# Patient Record
Sex: FEMALE | Race: WHITE | NOT HISPANIC OR LATINO | Employment: FULL TIME | URBAN - METROPOLITAN AREA
[De-identification: names, ages, dates, MRNs, and addresses within clinical notes are randomized per-mention and may not be internally consistent; named-entity substitution may affect disease eponyms.]

---

## 2017-05-15 ENCOUNTER — ALLSCRIPTS OFFICE VISIT (OUTPATIENT)
Dept: OTHER | Facility: OTHER | Age: 57
End: 2017-05-15

## 2017-06-22 ENCOUNTER — ALLSCRIPTS OFFICE VISIT (OUTPATIENT)
Dept: OTHER | Facility: OTHER | Age: 57
End: 2017-06-22

## 2017-09-04 DIAGNOSIS — E03.9 HYPOTHYROIDISM: ICD-10-CM

## 2017-09-04 DIAGNOSIS — E78.5 HYPERLIPIDEMIA: ICD-10-CM

## 2017-09-04 DIAGNOSIS — Z13.21 ENCOUNTER FOR SCREENING FOR NUTRITIONAL DISORDER: ICD-10-CM

## 2017-09-10 ENCOUNTER — LAB CONVERSION - ENCOUNTER (OUTPATIENT)
Dept: OTHER | Facility: OTHER | Age: 57
End: 2017-09-10

## 2017-09-10 LAB
25(OH)D3 SERPL-MCNC: 31 NG/ML (ref 30–100)
A/G RATIO (HISTORICAL): 1.8 (CALC) (ref 1–2.5)
ALBUMIN SERPL BCP-MCNC: 4.2 G/DL (ref 3.6–5.1)
ALP SERPL-CCNC: 69 U/L (ref 33–130)
ALT SERPL W P-5'-P-CCNC: 16 U/L (ref 6–29)
AST SERPL W P-5'-P-CCNC: 19 U/L (ref 10–35)
BASOPHILS # BLD AUTO: 0.8 %
BASOPHILS # BLD AUTO: 30 CELLS/UL (ref 0–200)
BILIRUB SERPL-MCNC: 0.4 MG/DL (ref 0.2–1.2)
BUN SERPL-MCNC: 19 MG/DL (ref 7–25)
BUN/CREA RATIO (HISTORICAL): NORMAL (CALC) (ref 6–22)
CALCIUM SERPL-MCNC: 9.4 MG/DL (ref 8.6–10.4)
CHLORIDE SERPL-SCNC: 107 MMOL/L (ref 98–110)
CHOLEST SERPL-MCNC: 211 MG/DL
CHOLEST/HDLC SERPL: 3.1 (CALC)
CO2 SERPL-SCNC: 27 MMOL/L (ref 20–31)
CREAT SERPL-MCNC: 0.56 MG/DL (ref 0.5–1.05)
DEPRECATED RDW RBC AUTO: 14.5 % (ref 11–15)
EGFR AFRICAN AMERICAN (HISTORICAL): 119 ML/MIN/1.73M2
EGFR-AMERICAN CALC (HISTORICAL): 103 ML/MIN/1.73M2
EOSINOPHIL # BLD AUTO: 122 CELLS/UL (ref 15–500)
EOSINOPHIL # BLD AUTO: 3.2 %
GAMMA GLOBULIN (HISTORICAL): 2.4 G/DL (CALC) (ref 1.9–3.7)
GLUCOSE (HISTORICAL): 84 MG/DL (ref 65–99)
HCT VFR BLD AUTO: 32.7 % (ref 35–45)
HDLC SERPL-MCNC: 67 MG/DL
HGB BLD-MCNC: 10.5 G/DL (ref 11.7–15.5)
LDL CHOLESTEROL (HISTORICAL): 129 MG/DL (CALC)
LYMPHOCYTES # BLD AUTO: 1201 CELLS/UL (ref 850–3900)
LYMPHOCYTES # BLD AUTO: 31.6 %
MCH RBC QN AUTO: 26.7 PG (ref 27–33)
MCHC RBC AUTO-ENTMCNC: 32.1 G/DL (ref 32–36)
MCV RBC AUTO: 83.2 FL (ref 80–100)
MONOCYTES # BLD AUTO: 380 CELLS/UL (ref 200–950)
MONOCYTES (HISTORICAL): 10 %
NEUTROPHILS # BLD AUTO: 2067 CELLS/UL (ref 1500–7800)
NEUTROPHILS # BLD AUTO: 54.4 %
NON-HDL-CHOL (CHOL-HDL) (HISTORICAL): 144 MG/DL (CALC)
PLATELET # BLD AUTO: 276 THOUSAND/UL (ref 140–400)
PMV BLD AUTO: 10.9 FL (ref 7.5–12.5)
POTASSIUM SERPL-SCNC: 4.1 MMOL/L (ref 3.5–5.3)
RBC # BLD AUTO: 3.93 MILLION/UL (ref 3.8–5.1)
SODIUM SERPL-SCNC: 142 MMOL/L (ref 135–146)
TOTAL PROTEIN (HISTORICAL): 6.6 G/DL (ref 6.1–8.1)
TRIGL SERPL-MCNC: 48 MG/DL
TSH SERPL DL<=0.05 MIU/L-ACNC: 0.03 MIU/L (ref 0.4–4.5)
WBC # BLD AUTO: 3.8 THOUSAND/UL (ref 3.8–10.8)

## 2017-11-02 DIAGNOSIS — E03.9 HYPOTHYROIDISM: ICD-10-CM

## 2017-12-17 ENCOUNTER — LAB CONVERSION - ENCOUNTER (OUTPATIENT)
Dept: OTHER | Facility: OTHER | Age: 57
End: 2017-12-17

## 2017-12-17 LAB — TSH SERPL DL<=0.05 MIU/L-ACNC: 0.25 MIU/L (ref 0.4–4.5)

## 2018-01-02 ENCOUNTER — TRANSCRIBE ORDERS (OUTPATIENT)
Dept: ADMINISTRATIVE | Facility: HOSPITAL | Age: 58
End: 2018-01-02

## 2018-01-02 DIAGNOSIS — M81.0 SENILE OSTEOPOROSIS: Primary | ICD-10-CM

## 2018-01-04 ENCOUNTER — GENERIC CONVERSION - ENCOUNTER (OUTPATIENT)
Dept: FAMILY MEDICINE CLINIC | Facility: MEDICAL CENTER | Age: 58
End: 2018-01-04

## 2018-01-04 ENCOUNTER — HOSPITAL ENCOUNTER (OUTPATIENT)
Dept: RADIOLOGY | Facility: MEDICAL CENTER | Age: 58
Discharge: HOME | End: 2018-01-04
Payer: COMMERCIAL

## 2018-01-04 DIAGNOSIS — M81.0 SENILE OSTEOPOROSIS: ICD-10-CM

## 2018-01-04 PROCEDURE — 77080 DXA BONE DENSITY AXIAL: CPT

## 2018-01-11 ENCOUNTER — ALLSCRIPTS OFFICE VISIT (OUTPATIENT)
Dept: OTHER | Facility: OTHER | Age: 58
End: 2018-01-11

## 2018-01-11 NOTE — MISCELLANEOUS
Provider Comments  Provider Comments:   Patient no showed for her rheumatology follow up today        Signatures   Electronically signed by : MAKENNA Ibarra; Oct 17 2016  3:16PM EST                       (Author)

## 2018-01-12 VITALS
WEIGHT: 132 LBS | BODY MASS INDEX: 21.21 KG/M2 | HEIGHT: 66 IN | HEART RATE: 70 BPM | TEMPERATURE: 98.1 F | RESPIRATION RATE: 16 BRPM | DIASTOLIC BLOOD PRESSURE: 78 MMHG | SYSTOLIC BLOOD PRESSURE: 132 MMHG

## 2018-01-12 NOTE — CONSULTS
Assessment    1  Arthralgia (719 40) (M25 50)   2  Fibromyalgia (729 1) (M79 7)   3  Malaise and fatigue (780 79) (R53 81,R53 83)   4  Anemia (285 9) (D64 9)   5  Hypothyroidism (244 9) (E03 9)   6  Hyperlipidemia (272 4) (E78 5)   7  Anxiety disorder (300 00) (F41 9)   8  Celiac sprue (579 0) (K90 0)    Plan  Anemia, Arthralgia, Malaise and fatigue    · (1) FERRITIN; Status:Active; Requested for:80Byv2877;    · (1) FOLATE; Status:Active; Requested for:61Djb0168;    · (1) IRON; Status:Active; Requested for:06Lpm4144;    · (1) TIBC; Status:Active; Requested for:72Qfh4381;    · (1) VITAMIN B12; Status:Active; Requested for:07Apr2016; Arthralgia    · Call [phone] if: The pain seems worse ; Status:Complete;   Done: 05ZJF4058   · Call [phone] if: You have questions or concerns about your problem ;  Status:Complete;   Done: 07Apr2016  Arthralgia, Malaise and fatigue    · (1) REJI SCREEN W/REFLEX TO TITER/PATTERN; Status:Active; Requested  for:68Ykr1631;    · (1) CBC/PLT/DIFF; Status:Active; Requested for:68Yds9940;    · (1) CHRONIC HEPATITIS PANEL; Status:Active; Requested for:41Hfq2886;    · (1) COMPREHENSIVE METABOLIC PANEL; Status:Active; Requested for:28Xgw7019;    · (1) C-REACTIVE PROTEIN; Status:Active; Requested HPY:46NNV1250;    · (1) CYCLIC CITRULLINATED PEPTIDE; Status:Active; Requested for:35Oux8605;    · (1) HIV AG/AB COMBO, 4TH GEN; [Do Not Release]; Status:Active; Requested  for:10Etd1552;    · (1) HLA B27; Status:Active; Requested for:27Xww2141;    · (1) RHEUMATOID FACTOR SCREEN; Status:Active; Requested for:60Wmp5430;    · (1) SED RATE; Status:Active; Requested for:15Ews3952;    · (1) SJOGREN'S ANTIBODIES; Status:Active; Requested for:07Apr2016;    · (1) TSH WITH FT4 REFLEX; Status:Active; Requested for:07Apr2016;    · (1) VITAMIN D 25-HYDROXY; Status:Active;  Requested for:07Apr2016;     Discussion/Summary    Ms Theodore Nielsen is a 50-year-old  female who presents the office with a long-standing history of fibromyalgia, with recent worsening in her arthralgias  She states that she previously saw Dr Raymundo Martin many years ago, maybe initial diagnosis of fibromyalgia  She did not offer her any definitive treatments for her symptoms at that time, with the exception of encouraging exercise  She complains of significant pain in her bilateral fingers, wrists, and right hip  She reports locking of her right hip and right shoulder at times  She has significant difficulty working because of her pain  She also reports severe fatigue  She has a family history of rheumatoid arthritis in her mother and father, and has concerns about this being a potential issue in her as well  She also complains of neck and lower back pain  The pain seems to have worsened over the last year, without any obvious precipitating factor for this  She does occasionally take Tylenol as needed for her discomfort with only mild relief  She is unable to take NSAIDs because of an allergy  She does report difficulty sleeping at night because of her pain  She does report the pain as being sharp, achy, and locking in quality  The pain seems to be worsened by her activities at her job  She has noted swelling in her knees, especially the right, which has had prior surgery  She also reports significant ankle pain when she is ambulating  She reports a significant amount of morning stiffness typically lasting one to 2 hours in duration, as well as gelling when sitting for prolonged periods of time  She also reports nonrestorative sleep  On exam, there is mild synovitis of the bilateral PIPs of the hands, as well as the bilateral ankles  There is no other active synovitis  She has diffuse myofascial tender points throughout the spine, as well as the bilateral upper and lower extremities  There is crepitus of the bilateral knees  She is decreased range of motion of her shoulders, cervical spine, and lumbar spine   Her pain is out of proportion to her exam findings  No recent rheumatologic labs were available for review today  At this time, she does appear to have a mild polyarthritis involving the PIPs of her hands and her bilateral ankles, of unclear etiology  Her pain is out of proportion to her exam findings, and it is quite possible that fibromyalgia is active and uncontrolled as well  I will not start her on any medications at this time, but I will check additional laboratory studies to further investigate this  I will reevaluate her in one month  She will call in the interim if there are any questions or concerns  Counseling  Rheumatology Counseling Documentation: The patient was counseled regarding instructions for management and impressions  Chief Complaint  Arthralgias and fatigue      History of Present Illness  Pt  is a 62 yo  female who presents with history of fibromyalgia  Previously saw Dr Austyn Narvaez many years ago, who did not offer any specific treatment  c/o pain in fingers, wrists, and R hip  R hip locks when walking  Also with R shoulder pain  + difficulty working because of pain  Also with severe fatigue  + family history of RA in mom and dad  Also with pain in neck and back as well  + difficulty sleeping due to pain  Pain began to worsen over the last year  No obvious precipitating factors  Unable to take NSAIDs -> allergic  Takes Tylenol as needed for pain with some relief  Also uses topical OTC aids  Pain has been constant over the last several months  Pain described as sharp, aching, and "locking" in quality  Pain worsened by work activities  + swelling in knees  History of prior surgery on R knee  Also with ankle pain when walking  + AM stiffness x 1-2 hours  + gelling when sitting for periods of time  + non-restorative sleep  RAPID3: 12 0/30      Review of Systems    Constitutional: fatigue and anorexia, but no fever, no recent weight gain, no chills and no recent weight loss     HEENT: blurred vision, but no double vision, no amaurosis fugax, no dryness of the eyes, no eye pain, no erythema eye(s), no dryness mouth, no mouth sores, not feeling congested and no sore throat  Cardiovascular: dyspnea on exertion, but no chest pain or pressure and no swelling in the arms or legs  Respiratory: no unusual or persistent cough, no shortness of breath and no pleurisy  Gastrointestinal: no abdominal pain, no nausea, no vomiting, no heartburn, no diarrhea, no constipation, no melena and no BRBPR  Genitourinary: no foamy urine, but no dysuria and no hematuria  Musculoskeletal: as noted in HPI  Integumentary alopecia, but no rash, no Raynaud's, no nail changes and no photosensitivity  Endocrine no polyuria and no polydipsia  Hematologic/Lymphatic: no unusual bleeding and no tendency for easy bruising  Neurological: headache, but no vertigo or dizziness, no tingling and no weakness  Psychiatric: insomnia, non-restorative sleep, depression and anxiety  Active Problems    1  Anemia (285 9) (D64 9)   2  Aneurysm of thoracic aorta (441 2) (I71 2)   3  Aneurysm of thoracic aorta (441 2) (I71 2)   4  Aneurysm, abdominal aortic (441 4) (I71 4)   5  Anxiety disorder (300 00) (F41 9)   6  Arthralgia (719 40) (M25 50)   7  Celiac disease (579 0) (K90 0)   8  Celiac sprue (579 0) (K90 0)   9  Fibromyalgia (729 1) (M79 7)   10  Herpes zoster (053 9) (B02 9)   11  Hyperlipidemia (272 4) (E78 5)   12  Hypothyroidism (244 9) (E03 9)   13  Insomnia (780 52) (G47 00)   14  Leg pain, bilateral (729 5) (M79 604,M79 605)   15  Lichen simplex chronicus (698 3) (L28 0)   16  Skin lesion (709 9) (L98 9)   17  Thyroid disorder (246 9) (E07 9)   18  Tinnitus, unspecified laterality (388 30) (H93 19)   19  Tuberculin skin test positive (795 51) (R76 11)    Past Medical History    1  History of Celiac disease (579 0) (K90 0)   2  History of Chiari Malformation (741 00)   3  History of Colon cancer screening (V76 51) (Z12 11)   4  H/O nonmelanoma skin cancer (V10 83) (Z85 828)   5  History of hyperlipidemia (V12 29) (Z86 39)   6  History of squamous cell carcinoma of skin (V10 83) (Z85 828)   7  History of Need for influenza vaccination (V04 81) (Z23)   8  History of Screening for disorder of blood and blood-forming organs (V78 9) (Z13 0)   9  History of Screening for skin condition (V82 0) (Z13 89)   10  History of Thyroid disorder (246 9) (E07 9)    Surgical History    1  History of Cath Stent Placement   2  History of Cholecystectomy   3  History of Colonoscopy (Fiberoptic)   4  History of Hysterectomy   5  History of Intraoperative Transluminal Angioplasty Renal Artery   6  History of Laparoscopy Large Intestine Excision   7  History of Partial Colectomy    OB History  Pregnancy History (Brief):   Prior pregnancies: : 2  Para: 0 (abortions) and 2 (living)   Additional pregnancy history details: 0 miscarriage(s)       Family History    1  Family history of Arthritis (V17 7)   2  Family history of COPD, severe   3  Family history of Family Health Status Of Mother - Alive   4  Family history of rheumatoid arthritis (V17 7) (Z82 61)    5  Family history of COPD, severe   6  Family history of Family Health Status Of Father -    9  Family history of rheumatoid arthritis (V17 7) (Z82 61)   8  Family history of Hypertension (V17 49)   9  Family history of Lung cancer   10  Family history of Lung Cancer (V16 1)    11  Denied: Family history of Crohn's disease   12  Denied: Family history of psoriasis   13  Denied: Family history of systemic lupus erythematosus   14  Denied: Family history of ulcerative colitis    Social History    · Alcohol Use (History)   · Caffeine Use   · Employed   · Former smoker (R90 57) (V67 830)   · No drug use   · Occupation:   · Unknown If Ever Smoked    Current Meds   1  Daily Value Multivitamin TABS; TAKE 1 TABLET DAILY; Therapy: (Recorded:2016) to Recorded   2   Levothyroxine Sodium 137 MCG Oral Tablet; TAKE 1 TABLET BY MOUTH EVERY  DAY; Therapy: 51AZK7739 to (Last Rx:09Mar2016)  Requested for: 05ZJN0138 Ordered   3  Sertraline HCl - 50 MG Oral Tablet; take one tablet by mouth every day; Therapy: 78ITG8039 to (Last Rx:29Jan2016)  Requested for: 91RIJ6026 Ordered   4  Simvastatin 40 MG Oral Tablet; TAKE 1 TABLET DAILY; Therapy: 12TSX7482 to (Evaluate:33Ods9905); Last Rx:13Oct2015 Ordered   5  Tylenol Extra Strength 500 MG TABS; TAKE 2 TABLET Daily PRN pain; Therapy: (Recorded:07Apr2016) to Recorded    Immunizations   1 2    Influenza  10/11/2007 07JGB2601     Allergies    1  Morphine Derivatives   2  NSAIDs   3  Morphine Sulfate SOLN   4  Statins    Vitals  Signs [Data Includes: Current Encounter]   Recorded: 07Apr2016 01:56PM   Heart Rate: 78  Systolic: 663  Diastolic: 60  Weight: 197 lb   BMI Calculated: 20 99  BSA Calculated: 1 71    Physical Exam    Constitutional   General appearance: Abnormal   appears tired  Eyes   Conjunctiva and lids: No swelling, erythema or discharge  Pupils and irises: Equal, round and reactive to light  Ears, Nose, Mouth, and Throat   External inspection of ears and nose: Normal     Oropharynx: Normal with no erythema, edema, exudate lesions, or ulcers  Pulmonary   Respiratory effort: No increased work of breathing or signs of respiratory distress  Auscultation of lungs: Clear to auscultation  Cardiovascular   Auscultation of heart: Normal rate and rhythm, normal S1 and S2, without murmurs  Examination of extremities for edema and/or varicosities: Normal     Lymphatic   Palpation of lymph nodes in neck: No lymphadenopathy  Psychiatric   Orientation to person, place, and time: Normal     Mood and affect: Normal         Right wrist tenderness  Right elbow tenderness  Right glenohumeral joint tenderness and restricted ROM  Left wrist tenderness  Left Elbow tenderness  Left glenohumeral joint tenderness and restricted ROM     Right ankle tenderness  Right knee tenderness  Right hip tenderness  Left ankle tenderness  Left knee tenderness  Left hip tenderness  Musculoskeletal - Joints, bones, and muscles: Abnormal  Palpation - bilateral knee crepitus  Muscle strength/tone: Abnormal  Motor Strength Findings: right hand dominance, bilateral upper extremity weakness and bilateral lower extremity weakness  Right upper extremity: shoulder flexion 4/5, shoulder extension 4/5, biceps 5/5, triceps 5/5, the hand  was weak  Left upper extremity shoulder flexion 4/5, shoulder extension 4/5, biceps 5/5, triceps 5/5, the hand  was weak  (+ poor effort due to pain)  Right lower extremity strength: hip flexion 4/5, hip abduction 5/5, hip adduction 5/5, knee flexion 5/5, knee extension 5/5, ankle dorsiflexion 5/5, ankle plantar flexion 5/5  Left lower extremity strength: hip flexion 4/5, hip abduction 5/5, hip adduction 5/5, knee flexion 5/5, knee extension 5/5, ankle dorsiflexion 5/5, ankle plantar flexion 5/5  (+ poor effort due to pain)  Skin - Skin and subcutaneous tissue: Normal    Neurologic - Reflexes: Normal  Deep tendon reflexes: 2+ right biceps, 2+ left biceps, 2+ right triceps, 2+ left triceps, 2+ right brachioradialis, 2+ left brachioradialis, 2+ right patella, 2+ left patella, 2+ right ankle jerk and 2+ left ankle jerk  Sensation: Normal    Additional Findings - + diffuse myofascial tender points; Pain out of proportion to exam findings  The patient has tenderness in all MCP, PIP and DIP joints of the right hand  The patient has tenderness in all MCP, PIP and DIP joints of the left hand  The patient has swelling of all PIP joints of the right hand  The patient has swelling of all PIP joints of the left hand  The patient has tenderness in all MTP joints of the right foot  The patient has tenderness in all MTP joints of the left foot        Future Appointments    Date/Time Provider Specialty Site   07/07/2016 08:00 PM MAIRA Max  [address] [facility]   05/06/2016 11:20 AM Eugenio Krishnamurthy DO Rheumatology  [address] [facility]     Signatures   Electronically signed by : Raymond Dewey DO;  Apr 7 2016  9:01PM EST                       (Author)

## 2018-01-13 NOTE — PROGRESS NOTES
Assessment   1  Hypothyroidism (244 9) (E03 9)   2  Hyperlipidemia (272 4) (E78 5)   3  Influenza (487 1) (J11 1)   4  Encounter for preventive health examination (V70 0) (Z00 00)   5  Vitamin D deficiency (268 9) (E55 9)   6  Osteoporosis (733 00) (M81 0)    Plan   Hypothyroidism    · (1) TSH; Status:Active - Retrospective Authorization; Requested for:[id number]; Influenza    · Oseltamivir Phosphate 75 MG Oral Capsule (Tamiflu); take one capsule bid for 5    days      Prescription for Tamiflu  Fluids, Advil as needed  Increase sertraline 100 milligrams  TSH in 3 months  Recheck in 6 weeks  Discussion/Summary      1  Probable influenza-will treat with Tamiflu  Anxiety/depression will increase dose of sertraline 200 milligrams  Hypothyroidism-controlled Elevated blood pressure-will follow Osteoporosis-declines medication  Discussed addition of calcium supplement  Continue vitamin-D  Advised weight-bearing exercise and avoidance of bone robbers  She wishes to repeat in 2 years  4   5       Chief Complaint   1  Cold Symptoms  c/o headache and body aches  Exposed to the flu by co-workers      History of Present Illness   Marsha Mack says she has nasal congestion, cough, sore throat  Took Tylenol which didn 't help  Started yesterday  She feels lousy  says she has coworker with the flu   has continued to be under lot of stress with her son who is a drug addict  He was recently hospitalized for an overdose  She was hoping that he would attend rehab but does not think that can happen  She has been very anxious about this  She is taking 50 milligram sertraline  She thinks this is why her blood pressure is up  She said all she can do is pray  otherwise feels well  Energy level is good  Active Problems   1  Anemia (285 9) (D64 9)   2  Aneurysm of thoracic aorta (441 2) (I71 2)   3  Aneurysm of thoracic aorta (441 2) (I71 2)   4  Aneurysm, abdominal aortic (441 4) (I71 4)   5   Anxiety disorder (300 00) (F41 9)   6  Arthralgia (719 40) (M25 50)   7  Celiac disease (579 0) (K90 0)   8  Celiac sprue (579 0) (K90 0)   9  COPD (chronic obstructive pulmonary disease) (496) (J44 9)   10  Fibromyalgia (729 1) (M79 7)   11  Hearing loss (389 9) (H91 90)   12  Herpes zoster (053 9) (B02 9)   13  Hyperlipidemia (272 4) (E78 5)   14  Hypothyroidism (244 9) (E03 9)   15  Insomnia (780 52) (G47 00)   16  Iron deficiency anemia (280 9) (D50 9)   17  Leg pain, bilateral (729 5) (M79 604,M79 605)   18  Lichen simplex chronicus (698 3) (L28 0)   19  Malaise and fatigue (780 79) (R53 81,R53 83)   20  Primary generalized (osteo)arthritis (715 09) (M15 0)   21  Skin lesion (709 9) (L98 9)   22  Thyroid disorder (246 9) (E07 9)   23  Tinnitus, unspecified laterality (388 30) (H93 19)   24  Tuberculin skin test positive (795 51) (R76 11)   25  Vitamin D deficiency (268 9) (E55 9)    Past Medical History   1  History of Celiac disease (579 0) (K90 0)   2  History of Chiari Malformation (741 00)   3  History of Colon cancer screening (V76 51) (Z12 11)   4  History of Encounter for vitamin deficiency screening (V77 99) (Z13 21)   5  H/O nonmelanoma skin cancer (V10 83) (K43 231)   6  History of hyperlipidemia (V12 29) (Z86 39)   7  History of screening mammography (V15 89) (Z92 89)   8  History of squamous cell carcinoma of skin (V10 83) (Z85 828)   9  History of Need for influenza vaccination (V04 81) (Z23)   10  History of Osteoporosis screening (V82 81) (Z13 820)   11  History of Screening for disorder of blood and blood-forming organs (V78 9) (Z13 0)   12  History of Screening for skin condition (V82 0) (Z13 89)   13  History of Thyroid disorder (246 9) (E07 9)    Surgical History   1  History of Cath Stent Placement   2  History of Cholecystectomy   3  History of Colonoscopy (Fiberoptic)   4  History of Hysterectomy   5  History of Intraoperative Transluminal Angioplasty Renal Artery   6   History of Laparoscopy Large Intestine Excision   7  History of Partial Colectomy    Family History   Mother    1  Family history of Arthritis (V17 7)   2  Family history of COPD, severe   3  Family history of Family Health Status Of Mother - Alive   4  Family history of rheumatoid arthritis (V17 7) (Z82 61)  Father    5  Family history of COPD, severe   6  Family history of Family Health Status Of Father -    9  Family history of rheumatoid arthritis (V17 7) (Z82 61)   8  Family history of Hypertension (V17 49)   9  Family history of Lung cancer   10  Family history of Lung Cancer (V16 1)  Family History    11  Denied: Family history of Crohn's disease   12  Denied: Family history of psoriasis   13  Denied: Family history of systemic lupus erythematosus   14  Denied: Family history of ulcerative colitis    Social History    · Alcohol Use (History)   · Caffeine Use   · Employed   · Former smoker (P77 69) (B98 913)   · No drug use   · Occupation:   · Unknown If Ever Smoked    Current Meds    1  Daily Value Multivitamin TABS; TAKE 1 TABLET DAILY; Therapy: (Recorded:2016) to Recorded   2  Levothyroxine Sodium 125 MCG Oral Tablet; take 1 tablet daily in the morning; Therapy: 21FAP6441 to (Lennox Barrett)  Requested for: 30VVF9897; Last     Rx:2018 Ordered   3  Meloxicam 7 5 MG Oral Tablet; TAKE 1 TABLET Twice daily PRN PAIN WITH FOOD; Therapy: 77Iko6096 to [phone])  Requested for: 43FHW9403; Last     MO:18KHS0681 Ordered   4  Sertraline HCl - 50 MG Oral Tablet; take one tablet by mouth every day; Therapy: 01ODP9786 to (Last Rx:37Ivk0232)  Requested for: 00SZJ2488 Ordered   5  Tylenol Extra Strength 500 MG TABS; TAKE 2 TABLET Daily PRN pain; Therapy: (Recorded:2016) to Recorded   6  ValACYclovir HCl - 1 GM Oral Tablet; TAKE 1 TABLET 3 TIMES DAILY; Therapy: 28UHJ9782 to (Aurora Salgado)  Requested for: 98LMI0321; Last     MN:57WOM4706 Ordered   7   Ventolin  (90 Base) MCG/ACT Inhalation Aerosol Solution; INHALE 2 PUFFS     EVERY 4-6 HOURS AS NEEDED; Therapy: 52CRM2902 to (Last Rx:23Jun2017)  Requested for: 83WBZ9619 Ordered    Allergies   1  Morphine Derivatives   2  NSAIDs   3  Morphine Sulfate SOLN   4  Statins    Vitals   Vital Signs    Recorded: 65COW9495 04:32PM   Temperature 97 6 F   Heart Rate 76   Respiration 16   Systolic 045   Diastolic 76   Weight 109 lb 4 oz   BMI Calculated 21 18   BSA Calculated 1 67     Physical Exam        Constitutional Appears ill not toxic  Head and Face      Head and face: Normal        Palpation of the face and sinuses: No sinus tenderness  Eyes      Conjunctiva and lids: No swelling, erythema or discharge  Ears, Nose, Mouth, and Throat      Otoscopic examination: Tympanic membranes translucent with normal light reflex  Canals patent without erythema  Oropharynx: Normal with no erythema, edema, exudate or lesions  Neck      Neck: Supple, symmetric, trachea midline, no masses  Thyroid: Normal, no thyromegaly  Pulmonary      Respiratory effort: No increased work of breathing or signs of respiratory distress  Auscultation of lungs: Clear to auscultation  Cardiovascular      Auscultation of heart: Normal rate and rhythm, normal S1 and S2, no murmurs  Lymphatic      Palpation of lymph nodes in neck: No lymphadenopathy  Psychiatric      Mood and affect: Normal   Mood and Affect: flat  Results/Data   * DXA BONE DENSITY SPINE HIP AND PELVIS 82YYV2115 03:21PM Florance Pouch      Test Name Result Flag Reference   DXA BONE DENSITY SPINE HIP AND PELVIS (Report)     Addendum: Please note that the FRAX values are reversed  The 10 year risk of hip fracture is 1 9%  The 10 year risk of major osteoporotic fracture is 14%  CENTRAL DXA SCAN           CLINICAL HISTORY:  61year old post-menopausal  female risk factors include arthritis, hypothyroidism, previous fracture   Screening for osteoporosis  TECHNIQUE: Bone densitometry was performed using a Horizon A bone densitometer  Regions of interest appear properly placed  There are no obvious fractures or other confounding variables which could limit the study  COMPARISON: None  RESULTS:       LUMBAR SPINE: L1-L4:      BMD 0 772 gm/cm2      T-score -2 5      Z-score -1 2           LEFT TOTAL HIP:      BMD 0 701 gm/cm2      T-score -2 0      Z-score -0 8           LEFT FEMORAL NECK:      BMD 0 628 gm/cm2      T-score -2 0      Z-score -0 8                            IMPRESSION:      1  Based on the [facility] classification, the T-score of -2 5 in the lumbar spine is consistent with osteoporosis  2  According to the [address] [place] [address], prescription therapy is recommended with a T-score of -2 5 or less in the spine or hip after appropriate evaluation to exclude secondary causes  3  A daily intake of at least 1200 mg Calcium and 800 to 1000 IU of Vitamin D, as well as weight bearing and muscle strengthening exercise, fall prevention and avoidance of tobacco and excessive alcohol intake as basic preventive measures are       suggested  4  Repeat DXA in 18 - 24 months, on the same machine, as clinically indicated  The 10 year risk of hip fracture is 14%, with the 10 year risk of major osteoporotic fracture being 1 9%, as calculated by the [facility] fracture risk assessment tool (FRAX)  The current NOF guidelines recommend treating patients with FRAX 10 year risk score       of >3% for hip fracture and >20% for major osteoporotic fracture              WHO CLASSIFICATION:      Normal (a T-score of -1 0 or higher)      Low bone mineral density (a T-score of less than -1 0 but higher than -2 5)      Osteoporosis (a T-score of -2 5 or less)      Severe osteoporosis (a T-score of -2 5 or less with a fragility fracture)                            Workstation performed: [id number]           Signed by:      Vasile Farris MD      1/5/18      (Q) TSH, 3RD GENERATION 58OMA1290 10:05AM Rui Arndt   REPORT COMMENT:     FASTING:YES      Test Name Result Flag Reference   TSH 0 25 mIU/L L 0 40-4 50      (1) CBC/PLT/DIFF [id number] 08:27AM Rui Silvaerster      Test Name Result Flag Reference   WHITE BLOOD CELL COUNT 3 8 Thousand/uL  3 8-10 8   RED BLOOD CELL COUNT 3 93 Million/uL  3 80-5 10   HEMOGLOBIN 10 5 g/dL L 11 7-15 5   HEMATOCRIT 32 7 % L 35 0-45 0   MCV 83 2 fL  80 0-100 0   MCH 26 7 pg L 27 0-33 0   MCHC 32 1 g/dL  32 0-36 0   RDW 14 5 %  11 0-15 0   PLATELET COUNT 575 Thousand/uL  140-400   ABSOLUTE NEUTROPHILS 2067 cells/uL  9209-6331   ABSOLUTE LYMPHOCYTES 1201 cells/uL  850-3900   ABSOLUTE MONOCYTES 380 cells/uL  200-950   ABSOLUTE EOSINOPHILS 122 cells/uL     ABSOLUTE BASOPHILS 30 cells/uL  0-200   NEUTROPHILS 54 4 %     LYMPHOCYTES 31 6 %     MONOCYTES 10 0 %     EOSINOPHILS 3 2 %     BASOPHILS 0 8 %     MPV 10 9 fL  7 5-12 5      (1) COMPREHENSIVE METABOLIC PANEL 51DYO1305 46:57CB Rui Arndt      Test Name Result Flag Reference   GLUCOSE 84 mg/dL  65-99   Fasting reference interval   UREA NITROGEN (BUN) 19 mg/dL  7-25   CREATININE 0 56 mg/dL  0 50-1 05   For patients >52years of age, the reference limit     for Creatinine is approximately 13% higher for people     identified as -American  eGFR NON-AFR   AMERICAN 103 mL/min/1 73m2  > OR = 60   eGFR AFRICAN AMERICAN 119 mL/min/1 73m2  > OR = 60   BUN/CREATININE RATIO   6-21   NOT APPLICABLE (calc)   SODIUM 142 mmol/L  135-146   POTASSIUM 4 1 mmol/L  3 5-5 3   CHLORIDE 107 mmol/L     CARBON DIOXIDE 27 mmol/L  20-31   CALCIUM 9 4 mg/dL  8 6-10 4   PROTEIN, TOTAL 6 6 g/dL  6 1-8 1   ALBUMIN 4 2 g/dL  3 6-5 1   GLOBULIN 2 4 g/dL (calc)  1 9-3 7   ALBUMIN/GLOBULIN RATIO 1 8 (calc)  1 0-2 5   BILIRUBIN, TOTAL 0 4 mg/dL  0 2-1 2   ALKALINE PHOSPHATASE 69 U/L     AST 19 U/L  10-35   ALT 16 U/L  6-29      (1) LIPID PANEL, FASTING 77VOY7558 08:27AM Nora Childs      Test Name Result Flag Reference   CHOLESTEROL, TOTAL 211 mg/dL H <200   HDL CHOLESTEROL 67 mg/dL  >76   TRIGLICERIDES 48 mg/dL  <701   LDL-CHOLESTEROL 129 mg/dL (calc) H    Reference range: <100           Desirable range <100 mg/dL for patients with CHD or     diabetes and <70 mg/dL for diabetic patients with     known heart disease  The [place] calculation is a validated novel      method that provides better accuracy than the      Friedewald equation in the estimation of LDL-C,      particularly when TG levels are 150-400 mg/dL and      LDL-C levels are lower than 70 mg/dL  Reference:  Dave Forrest et al  Comparison of a Novel      Method vs the Friedewald Equation for Estimating      Low-Density Lipoprotein Cholesterol Levels From the      Standard Lipid Profile  LEONARD  2389;837(11): 9343-4698  For additional information, please refer to     http://[email or web address]/faq/OGB527     (This link is being provided for informational/     educational purposes only )   CHOL/HDLC RATIO 3 1 (calc)  <5 0   NON HDL CHOLESTEROL 144 mg/dL (calc) H <130   For patients with diabetes plus 1 major ASCVD risk      factor, treating to a non-HDL-C goal of <100 mg/dL      (LDL-C of <70 mg/dL) is considered a therapeutic      option        Signatures    Electronically signed by : MAIRA Palm ; Jan 11 2018 11:02PM EST                       (Author)

## 2018-01-14 NOTE — PROGRESS NOTES
Assessment    1  Fibromyalgia (729 1) (M79 7)   2  Malaise and fatigue (780 79) (R53 81,R53 83)   3  Primary generalized (osteo)arthritis (715 09) (M15 0)   4  Hypothyroidism (244 9) (E03 9)   5  Anxiety disorder (300 00) (F41 9)   6  Celiac disease (579 0) (K90 0)   7  Iron deficiency anemia (280 9) (D50 9)    Plan    1  Gabapentin 300 MG Oral Capsule; 1 TAB QHS X 1 week, THEN 1 TAB BID X 1   week, THEN 1 TAB TID   2  Call [phone] if: The pain seems worse ; Status:Complete;   Done: 31YED6958   3  Call [phone] if: You have questions or concerns about your problem ;   Status:Complete;   Done: 53LII5608   4  Follow-up visit in 3 months Evaluation and Treatment  Follow-up  Status: Complete    Done: 98LEQ4026    Discussion/Summary    Ms Tressa Michele has been feeling about the same since her last evaluation  She continues to have aching all over, especially in her arms and wrists  She reports that her pain is especially severe today  She states that her pain is constant, and there are no clear exacerbating factors for her pain  She did take Aleve for her discomfort this morning without much relief  She also complains of pain in her ankles and lower back  She has noted swelling in her hands  She also reports that she is unable to put pressure on her feet in the morning to walk because of severe pain  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  She does report morning stiffness typically lasting 30 minutes in duration  She does report difficulty sleeping because of pain, nonrestorative sleep, and fatigue  On exam, there is no active synovitis  She does have osteoarthritic changes in the bilateral hands, as well as crepitus of the bilateral knees  There are multiple myofascial tender points throughout the spine, as well as the bilateral upper and lower extremities   Review of laboratory studies revealed negative connective tissue disease serologies, a normal CBC, CMP, ESR, CRP, TSH, and vitamin D  Hepatitis panel and HIV testing were negative  At this time, her history, exam, and laboratory studies do appear most consistent with fibromyalgia and primary generalized osteoarthritis which is active and uncontrolled  We did discuss several treatment options, and we have opted to add gabapentin 300 mg daily at bedtime for 1 week, then 300 mg twice a day for 1 week, then 300 mg 3 times a day  I did explain that it will take 2-4 weeks for this medication become effective, and we can certainly titrate the dose as necessary  I will reevaluate her in 3 months  She will call in interim if there are any questions or concerns  Counseling  Rheumatology Counseling Documentation: The patient was counseled regarding diagnostic results, instructions for management, impressions and risks and benefits of treatment options  Chief Complaint  F/U polyarthralgia and FMS   Patient is here today for follow up of chronic conditions described in HPI  History of Present Illness  Pt  returns for F/U for polyarthralgia and FMS  Not having a good day today  c/o aching all over, especially in the arms and wrists  Pain is constant  No clear exacerbating factors for pain  Took Aleve for pain this AM without relief  Also with pain in ankles and lower back  + swelling in hands  Unable to put pressure on feet in AM to walk  + swelling in R knee after working shift at [facility]  + AM stiffness x 30 minutes  + difficulty sleeping due to pain  + non-restorative sleep  + fatigue  RAPID3: 13 3/30      Review of Systems    Constitutional: fatigue and anorexia, but no fever, no recent weight gain, no chills and no recent weight loss  HEENT: erythema eye(s), but no blurred vision, no double vision, no amaurosis fugax, no dryness of the eyes, no eye pain, no dryness mouth, no mouth sores, not feeling congested and no sore throat     Cardiovascular: no chest pain or pressure, no dyspnea on exertion and no swelling in the arms or legs  Respiratory: no unusual or persistent cough, no shortness of breath and no pleurisy  Gastrointestinal: no abdominal pain, no nausea, no vomiting, no heartburn, no diarrhea, no constipation, no melena and no BRBPR  Genitourinary: no foamy urine, but no dysuria and no hematuria  Musculoskeletal: as noted in HPI  Integumentary Raynaud's, but no rash, no alopecia, no nail changes and no photosensitivity  Endocrine no polyuria and no polydipsia  Hematologic/Lymphatic: no unusual bleeding and no tendency for easy bruising  Neurological: headache and weakness, but no vertigo or dizziness and no tingling  Psychiatric: insomnia and non-restorative sleep  Active Problems    1  Anemia (285 9) (D64 9)   2  Aneurysm of thoracic aorta (441 2) (I71 2)   3  Aneurysm of thoracic aorta (441 2) (I71 2)   4  Aneurysm, abdominal aortic (441 4) (I71 4)   5  Anxiety disorder (300 00) (F41 9)   6  Arthralgia (719 40) (M25 50)   7  Celiac disease (579 0) (K90 0)   8  Celiac sprue (579 0) (K90 0)   9  Fibromyalgia (729 1) (M79 7)   10  Herpes zoster (053 9) (B02 9)   11  Hyperlipidemia (272 4) (E78 5)   12  Hypothyroidism (244 9) (E03 9)   13  Insomnia (780 52) (G47 00)   14  Leg pain, bilateral (729 5) (M79 604,M79 605)   15  Lichen simplex chronicus (698 3) (L28 0)   16  Malaise and fatigue (780 79) (R53 81,R53 83)   17  Skin lesion (709 9) (L98 9)   18  Thyroid disorder (246 9) (E07 9)   19  Tinnitus, unspecified laterality (388 30) (H93 19)   20  Tuberculin skin test positive (795 51) (R76 11)    Past Medical History    1  History of Celiac disease (579 0) (K90 0)   2  History of Chiari Malformation (741 00)   3  History of Colon cancer screening (V76 51) (Z12 11)   4  H/O nonmelanoma skin cancer (V10 83) (Z85 828)   5  History of hyperlipidemia (V12 29) (Z86 39)   6  History of squamous cell carcinoma of skin (V10 83) (Z85 828)   7   History of Need for influenza vaccination (V04 81) (Z23) 8  History of Screening for disorder of blood and blood-forming organs (V78 9) (Z13 0)   9  History of Screening for skin condition (V82 0) (Z13 89)   10  History of Thyroid disorder (246 9) (E07 9)    The active problems and past medical history were reviewed and updated today  Surgical History    1  History of Cath Stent Placement   2  History of Cholecystectomy   3  History of Colonoscopy (Fiberoptic)   4  History of Hysterectomy   5  History of Intraoperative Transluminal Angioplasty Renal Artery   6  History of Laparoscopy Large Intestine Excision   7  History of Partial Colectomy    The surgical history was reviewed and updated today  Family History  Mother    1  Family history of Arthritis (V17 7)   2  Family history of COPD, severe   3  Family history of Family Health Status Of Mother - Alive   4  Family history of rheumatoid arthritis (V17 7) (Z82 61)  Father    5  Family history of COPD, severe   6  Family history of Family Health Status Of Father -    9  Family history of rheumatoid arthritis (V17 7) (Z82 61)   8  Family history of Hypertension (V17 49)   9  Family history of Lung cancer   10  Family history of Lung Cancer (V16 1)  Family History    11  Denied: Family history of Crohn's disease   12  Denied: Family history of psoriasis   13  Denied: Family history of systemic lupus erythematosus   14  Denied: Family history of ulcerative colitis    The family history was reviewed and updated today  Social History    · Alcohol Use (History)   · Caffeine Use   · Employed   · Former smoker (K92 15) (J79 816)   · No drug use   · Occupation:   · Unknown If Ever Smoked  The social history was reviewed and updated today  The social history was reviewed and is unchanged  Current Meds   1  Aleve 220 MG Oral Tablet; TAKE 2 TABLET Daily PRN pain; Therapy: (Recorded:00Olj6063) to Recorded   2  Daily Value Multivitamin TABS; TAKE 1 TABLET DAILY;    Therapy: (Recorded:2016) to Recorded   3  Levothyroxine Sodium 137 MCG Oral Tablet; TAKE 1 TABLET BY MOUTH EVERY  DAY; Therapy: 73KYT1576 to (Last Rx:09Mar2016)  Requested for: 52CPJ6460 Ordered   4  Sertraline HCl - 50 MG Oral Tablet; take one tablet by mouth every day; Therapy: 54TIB3965 to (Last Rx:29Jan2016)  Requested for: 26JGY7883 Ordered   5  Simvastatin 40 MG Oral Tablet; TAKE 1 TABLET DAILY; Therapy: 01APY3599 to (Evaluate:82Wpp3512); Last Rx:13Oct2015 Ordered   6  Tylenol Extra Strength 500 MG TABS; TAKE 2 TABLET Daily PRN pain; Therapy: (Recorded:07Apr2016) to Recorded    The medication list was reviewed and updated today  Immunizations  Influenza --- Anjali Tabatha: 10/11/2007Carolyn Gathers: 67QRL7195     Allergies    1  Morphine Derivatives   2  NSAIDs   3  Morphine Sulfate SOLN   4  Statins    Vitals  Signs [Data Includes: Current Encounter]   Recorded: 79HMF8095 11:20AM   Heart Rate: 78  Systolic: 538  Diastolic: 64  Weight: 920 lb   BMI Calculated: 20 67  BSA Calculated: 1 69    Physical Exam    Constitutional   General appearance: Abnormal   appears tired  Eyes   Conjunctiva and lids: No swelling, erythema or discharge  Pupils and irises: Equal, round and reactive to light  Ears, Nose, Mouth, and Throat   External inspection of ears and nose: Normal     Oropharynx: Normal with no erythema, edema, exudate lesions, or ulcers  Pulmonary   Respiratory effort: No increased work of breathing or signs of respiratory distress  Auscultation of lungs: Clear to auscultation  Cardiovascular   Auscultation of heart: Normal rate and rhythm, normal S1 and S2, without murmurs  Examination of extremities for edema and/or varicosities: Normal     Lymphatic   Palpation of lymph nodes in neck: No lymphadenopathy  Psychiatric   Orientation to person, place, and time: Normal     Mood and affect: Normal         Right wrist tenderness  Right elbow tenderness  Right glenohumeral joint tenderness and restricted ROM     Left wrist tenderness  Left Elbow tenderness  Left glenohumeral joint tenderness and restricted ROM  Right ankle tenderness  Right knee tenderness  Right hip tenderness  Left ankle tenderness  Left knee tenderness  Left hip tenderness  Musculoskeletal - Joints, bones, and muscles: Abnormal  Palpation - bilateral knee crepitus  Skin - Skin and subcutaneous tissue: Normal    Neurologic - Sensation: Normal    Additional Findings - + diffuse myofascial tender points; Pain out of proportion to exam findings  The patient has tenderness in all MCP, PIP and DIP joints of the right hand  The patient has tenderness in all MCP, PIP and DIP joints of the left hand  The patient has tenderness in all MTP joints of the right foot  The patient has tenderness in all MTP joints of the left foot  Future Appointments    Date/Time Provider Specialty Site   07/07/2016 08:00 PM MAIRA Ch   [facility]   08/04/2016 08:40 AM Baltazar Decker DO Rheumatology [address]     Signatures   Electronically signed by : Sari Burton DO; May  6 2016  2:59PM EST                       (Author)

## 2018-01-15 VITALS
TEMPERATURE: 98.2 F | HEART RATE: 88 BPM | OXYGEN SATURATION: 98 % | SYSTOLIC BLOOD PRESSURE: 146 MMHG | RESPIRATION RATE: 18 BRPM | BODY MASS INDEX: 21.33 KG/M2 | WEIGHT: 132.13 LBS | DIASTOLIC BLOOD PRESSURE: 82 MMHG

## 2018-01-15 NOTE — PROGRESS NOTES
Chief Complaint  Zuleyka Narvaez is here today for PFT testing pre and post  Procedure done and result has been scanned into the chart for review  Patient tolerated procedure well  Active Problems    1  Anemia (285 9) (D64 9)   2  Aneurysm of thoracic aorta (441 2) (I71 2)   3  Aneurysm of thoracic aorta (441 2) (I71 2)   4  Aneurysm, abdominal aortic (441 4) (I71 4)   5  Anxiety disorder (300 00) (F41 9)   6  Arthralgia (719 40) (M25 50)   7  Celiac disease (579 0) (K90 0)   8  Celiac sprue (579 0) (K90 0)   9  COPD (chronic obstructive pulmonary disease) (496) (J44 9)   10  Fibromyalgia (729 1) (M79 7)   11  Herpes zoster (053 9) (B02 9)   12  Hyperlipidemia (272 4) (E78 5)   13  Hypothyroidism (244 9) (E03 9)   14  Insomnia (780 52) (G47 00)   15  Iron deficiency anemia (280 9) (D50 9)   16  Leg pain, bilateral (729 5) (M79 604,M79 605)   17  Lichen simplex chronicus (698 3) (L28 0)   18  Malaise and fatigue (780 79) (R53 81,R53 83)   19  Primary generalized (osteo)arthritis (715 09) (M15 0)   20  Skin lesion (709 9) (L98 9)   21  Thyroid disorder (246 9) (E07 9)   22  Tinnitus, unspecified laterality (388 30) (H93 19)   23  Tuberculin skin test positive (795 51) (R76 11)   24  Vitamin D deficiency (268 9) (E55 9)    Current Meds   1  Daily Value Multivitamin TABS; TAKE 1 TABLET DAILY; Therapy: (Recorded:07Apr2016) to Recorded   2  Levothyroxine Sodium 137 MCG Oral Tablet; TAKE 1 TABLET BY MOUTH EVERY  DAY; Therapy: 20FQQ1726 to (Last Rx:09Mar2016)  Requested for: 46ATC1570 Ordered   3  Meloxicam 7 5 MG Oral Tablet; TAKE 1 TABLET Twice daily PRN PAIN WITH FOOD; Therapy: 90LQH8035 to (Monica Ramirez)  Requested for: 52Hxc4862; Last   Rx:45Tsr2853 Ordered   4  Sertraline HCl - 50 MG Oral Tablet (Zoloft); take one tablet by mouth every day; Therapy: 15REQ6671 to (Last Rx:92Fsa8781)  Requested for: 67VPO5714; Status: ACTIVE   - Retrospective Authorization Ordered   5   Tylenol Extra Strength 500 MG TABS; TAKE 2 TABLET Daily PRN pain; Therapy: (Recorded:73Asw5834) to Recorded   6  Ventolin  (90 Base) MCG/ACT Inhalation Aerosol Solution; INHALE 2 PUFFS   EVERY 4-6 HOURS AS NEEDED; Therapy: 04OFV9314 to (Last Rx:87Pgq8167)  Requested for: 60Eak0846 Ordered    Allergies    1  Morphine Derivatives   2  NSAIDs   3   Morphine Sulfate SOLN   4  Statins    Vitals  Signs    Height: 5 ft 6 in  Weight: 129 lb   BMI Calculated: 20 82  BSA Calculated: 1 67    Future Appointments    Date/Time Provider Specialty Site   10/04/2016 03:40 PM MAKENNA Gonzáles [facility]     Signatures   Electronically signed by : MAIRA Salcedo ; Aug 14 2016 10:44PM EST

## 2018-01-16 NOTE — PROGRESS NOTES
Assessment    1  Fibromyalgia (729 1) (M79 7)   2  Malaise and fatigue (780 79) (R53 81,R53 83)   3  Primary generalized (osteo)arthritis (715 09) (M15 0)   4  Hypothyroidism (244 9) (E03 9)   5  Anxiety disorder (300 00) (F41 9)   6  Celiac disease (579 0) (K90 0)   7  Iron deficiency anemia (280 9) (D50 9)    Plan    1  Meloxicam 7 5 MG Oral Tablet; TAKE 1 TABLET Twice daily PRN PAIN WITH FOOD   2  Follow-up visit in 2 months Evaluation and Treatment  Follow-up  Status: Hold For -   Scheduling  Requested for: 76Ahw6829    3  Call [phone] if: The pain seems worse ; Status:Complete;   Done: 56SMM4283   4  Call [phone] if: You have questions or concerns about your problem ;   Status:Complete;   Done: 95IZE1998   5  Call [phone] if: Your ankle swelling is getting worse ; Status:Complete;   Done:   60FYE9685    6  From  Gabapentin 300 MG Oral Capsule TAKE 1 CAPSULE 3 times daily To   Gabapentin 300 MG Oral Capsule TAKE 1 CAPSULE 2 times daily x 3 days, then 1   capsule daily x 3 days, then stop medication    Discussion/Summary    This is a 79-year-old female presenting today for follow-up for fibromyalgia  Patient states that she's been feeling about the same since last appointment  She is currently taking gabapentin 900 mg daily and has not noticed much relief  The patient states that she continues to have morning stiffness mostly in the ankles and shoulders lasting about 2 hours  She also is increased pain with walking and with activity  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 and when she is lifting patient she does have significant pain  She has noticed increased swelling in both legs and does have Dopplers scheduled for next week  In addition she describes pain in both knees as well as the right hip and neck  She is also experiencing cramping at night in both legs  She does describe some right knee swelling   She does have difficulty with sleep due to pain as well as nonrestorative sleep and fatigue throughout the day  In addition to gabapentin she is utilizing Aleve as needed with minimal relief and has trialed naproxen in the past without relief  On physical exam, patient has no synovitis  Patient does have multiple myofascial tender points primarily in the shoulders as well as the hips, ankles, and feet  She has crepitus of bilateral knees  Patient's history and physical is most consistent with fibromyalgia which appears to be active at this time despite gabapentin  Patient is found no relief with gabapentin therefore we will wean patient's gabapentin from 3 tablets daily to 2 tablets daily for 3 days and further to 1 tablet daily for 3 days then she will stop the medication  Patient was started on meloxicam 7 5 mg to take twice a day as needed for pain with food  Patient was told that she may not notice complete relief of pain however we will consider further medications including amitriptyline, Cymbalta, or Lyrica in the future  In addition patient will follow-up for Dopplers of the lower extremities  We will plan to see patient back in 2 months time at her request however patient will call in the interim if she has any further questions or concerns  The patient was counseled regarding instructions for management, prognosis, patient and family education, risks and benefits of treatment options, importance of compliance with treatment  Chief Complaint  F/U FMS   Patient is here today for follow up of chronic conditions described in HPI  History of Present Illness  Patient is in the office today for follow up for FMS  feeling about the same  +Am stiffness in the ankles and shoulders x 2 hours  Increased pain with walking and activity  Noticing BLE swelling, to have Dopplers performed  Pain in knees and right hip and neck as well  Having cramping in the legs mostly at night  Right knee swelling as well  +Difficulty with sleep due to pain  +Non restorative sleep  +Fatigue   Started Gabapentin 900 mg daily  Aleve and Naprosyn in the past with limited relief  RAPID3: 18 3 /30      Review of Systems    Constitutional: fatigue and anorexia, but no fever, no recent weight gain, no chills and no recent weight loss  HEENT: blurred vision, but no double vision, no amaurosis fugax, no dryness of the eyes, no eye pain, no erythema eye(s), no dryness mouth, no mouth sores, not feeling congested and no sore throat  Cardiovascular: dyspnea on exertion and swelling in the arms or legs, but no chest pain or pressure  Respiratory: no unusual or persistent cough, no shortness of breath and no pleurisy  Gastrointestinal: no abdominal pain, no nausea, no vomiting, no heartburn, no diarrhea, no constipation, no melena and no BRBPR  Genitourinary: No foamy urine, but no dysuria and no hematuria  Musculoskeletal: as noted in HPI  Integumentary Raynaud's and alopecia, but no rash, no nail changes and no photosensitivity  Endocrine no polyuria and no polydipsia  Hematologic/Lymphatic: no unusual bleeding and no tendency for easy bruising  Neurological: headache and tingling, but no vertigo or dizziness and no weakness  Psychiatric: insomnia and non-restorative sleep  Active Problems    1  Anemia (285 9) (D64 9)   2  Aneurysm of thoracic aorta (441 2) (I71 2)   3  Aneurysm of thoracic aorta (441 2) (I71 2)   4  Aneurysm, abdominal aortic (441 4) (I71 4)   5  Anxiety disorder (300 00) (F41 9)   6  Arthralgia (719 40) (M25 50)   7  Celiac disease (579 0) (K90 0)   8  Celiac sprue (579 0) (K90 0)   9  COPD (chronic obstructive pulmonary disease) (496) (J44 9)   10  Fibromyalgia (729 1) (M79 7)   11  Herpes zoster (053 9) (B02 9)   12  Hyperlipidemia (272 4) (E78 5)   13  Hypothyroidism (244 9) (E03 9)   14  Insomnia (780 52) (G47 00)   15  Iron deficiency anemia (280 9) (D50 9)   16  Leg pain, bilateral (729 5) (M79 604,M79 605)   17  Lichen simplex chronicus (698 3) (L28 0)   18   Malaise and fatigue (780 79) (R53 81,R53 83)   19  Primary generalized (osteo)arthritis (715 09) (M15 0)   20  Skin lesion (709 9) (L98 9)   21  Thyroid disorder (246 9) (E07 9)   22  Tinnitus, unspecified laterality (388 30) (H93 19)   23  Tuberculin skin test positive (795 51) (R76 11)   24  Vitamin D deficiency (268 9) (E55 9)    Past Medical History    1  History of Celiac disease (579 0) (K90 0)   2  History of Chiari Malformation (741 00)   3  History of Colon cancer screening (V76 51) (Z12 11)   4  H/O nonmelanoma skin cancer (V10 83) (Z85 828)   5  History of hyperlipidemia (V12 29) (Z86 39)   6  History of squamous cell carcinoma of skin (V10 83) (Z85 828)   7  History of Need for influenza vaccination (V04 81) (Z23)   8  History of Screening for disorder of blood and blood-forming organs (V78 9) (Z13 0)   9  History of Screening for skin condition (V82 0) (Z13 89)   10  History of Thyroid disorder (246 9) (E07 9)    The active problems and past medical history were reviewed and updated today  Surgical History    1  History of Cath Stent Placement   2  History of Cholecystectomy   3  History of Colonoscopy (Fiberoptic)   4  History of Hysterectomy   5  History of Intraoperative Transluminal Angioplasty Renal Artery   6  History of Laparoscopy Large Intestine Excision   7  History of Partial Colectomy    The surgical history was reviewed and updated today  Family History  Mother    1  Family history of Arthritis (V17 7)   2  Family history of COPD, severe   3  Family history of Family Health Status Of Mother - Alive   4  Family history of rheumatoid arthritis (V17 7) (Z82 61)  Father    5  Family history of COPD, severe   6  Family history of Family Health Status Of Father -    9  Family history of rheumatoid arthritis (V17 7) (Z82 61)   8  Family history of Hypertension (V17 49)   9  Family history of Lung cancer   10  Family history of Lung Cancer (V16 1)  Family History    11   Denied: Family history of Crohn's disease   12  Denied: Family history of psoriasis   13  Denied: Family history of systemic lupus erythematosus   14  Denied: Family history of ulcerative colitis    The family history was reviewed and updated today  Social History    · Alcohol Use (History)   · Caffeine Use   · Employed   · Former smoker (W99 45) (N59 466)   · No drug use   · Occupation:   · Unknown If Ever Smoked  The social history was reviewed and updated today  The social history was reviewed and is unchanged  Current Meds   1  Daily Value Multivitamin TABS; TAKE 1 TABLET DAILY; Therapy: (Recorded:07Apr2016) to Recorded   2  Gabapentin 300 MG Oral Capsule; TAKE 1 CAPSULE 3 times daily Recorded   3  Levothyroxine Sodium 137 MCG Oral Tablet; TAKE 1 TABLET BY MOUTH EVERY  DAY; Therapy: 60CRI1072 to (Last Rx:09Mar2016)  Requested for: 80CIT8777 Ordered   4  Sertraline HCl - 50 MG Oral Tablet; take one tablet by mouth every day; Therapy: 15HRU6462 to (Last Rx:60Bvf6338)  Requested for: 52HCI8123; Status: ACTIVE -   Retrospective Authorization Ordered   5  Tylenol Extra Strength 500 MG TABS; TAKE 2 TABLET Daily PRN pain; Therapy: (Recorded:07Apr2016) to Recorded   6  Ventolin  (90 Base) MCG/ACT Inhalation Aerosol Solution; INHALE 2 PUFFS   EVERY 4-6 HOURS AS NEEDED; Therapy: 89PMD0390 to (Last Rx:24Nmh9833)  Requested for: 17Gwb0167 Ordered    The medication list was reviewed and updated today  Immunizations  Influenza --- Anjali Mays: [id number]-[id number] Gathers: 23-Sep-2015     Allergies    1  Morphine Derivatives   2  NSAIDs   3  Morphine Sulfate SOLN   4  Statins    Vitals  Signs   Recorded: 26UIA4711 51:61LW   Systolic: 112  Diastolic: 70  Heart Rate: 84  Weight: 130 lb   BMI Calculated: 20 36  BSA Calculated: 1 68    Physical Exam    Constitutional   General appearance: No acute distress, well appearing and well nourished  Eyes   Conjunctiva and lids: No swelling, erythema or discharge      Pupils and irises: Equal, round and reactive to light  Ears, Nose, Mouth, and Throat   External inspection of ears and nose: Normal     Oropharynx: Normal with no erythema, edema, exudate lesions, or ulcers  Pulmonary   Respiratory effort: No increased work of breathing or signs of respiratory distress  Auscultation of lungs: Clear to auscultation  Cardiovascular   Auscultation of heart: Normal rate and rhythm, normal S1 and S2, without murmurs  Examination of extremities for edema and/or varicosities: Normal     Carotid pulses: Normal     Lymphatic   Palpation of lymph nodes in neck: No lymphadenopathy  Psychiatric   Orientation to person, place, and time: Normal     Mood and affect: Normal         Right wrist tenderness  Right glenohumeral joint tenderness  Left wrist tenderness  Left glenohumeral joint tenderness  Right ankle tenderness  Right knee tenderness  Right hip tenderness  Left ankle tenderness  Left knee tenderness  Left hip tenderness  Right Upper Extremity: Normal ROM  Left Upper Extremity: Normal ROM  Right Lower Extremity: Normal ROM  Left Lower Extremity: Normal ROM  Musculoskeletal - Joints, bones, and muscles: Abnormal  Palpation - tenderness (+multiple myofascial tender points) and bilateral knee crepitus  The patient has tenderness in all MCP, PIP and DIP joints of the right hand  The patient has tenderness in all MCP, PIP and DIP joints of the left hand  The patient has tenderness in all MTP joints of the right foot  The patient has tenderness in all MTP joints of the left foot        Future Appointments    Date/Time Provider Specialty Site   10/04/2016 03:40 PM MAKENNA Gonzalez Rheumatology [facility]   08/10/2016 02:30 PM Tony Barlow, Nurse Schedule  [address]     Signatures   Electronically signed by : MAKENNA Molina; Aug  4 2016  9:15AM EST                       (Author)    Electronically signed by : Melinda Montaño DO; Aug  4 2016  9:23AM EST                       (Author)

## 2018-01-17 NOTE — MISCELLANEOUS
Message  Return to work or school:   mEa Bentley is under my professional care  She was seen in my office on 7/7/2016    She is not able to return to work until 7/25/16     Excuse from work 7/23/16 & 7/24/16          Signatures   Electronically signed by : Antoni Morfin OM; Jul 22 2016  2:31PM EST                       (Author)

## 2018-01-23 VITALS
RESPIRATION RATE: 16 BRPM | SYSTOLIC BLOOD PRESSURE: 140 MMHG | HEART RATE: 76 BPM | TEMPERATURE: 97.6 F | BODY MASS INDEX: 21.18 KG/M2 | WEIGHT: 131.25 LBS | DIASTOLIC BLOOD PRESSURE: 76 MMHG

## 2018-04-11 DIAGNOSIS — E03.9 HYPOTHYROIDISM: ICD-10-CM

## 2018-05-08 DIAGNOSIS — E03.9 ACQUIRED HYPOTHYROIDISM: Primary | ICD-10-CM

## 2018-05-08 RX ORDER — LEVOTHYROXINE SODIUM 0.12 MG/1
TABLET ORAL
Qty: 30 TABLET | Refills: 3 | Status: SHIPPED | OUTPATIENT
Start: 2018-05-08 | End: 2018-08-31 | Stop reason: SDUPTHER

## 2018-05-09 NOTE — TELEPHONE ENCOUNTER
Patient left a vm asking for a refill on her levothyroxine to go to [facility] in [place], looks like this was done by Dr Sondra Barkley last night  LM for pt letting her know

## 2018-07-17 DIAGNOSIS — I72.9 ANEURYSM (HCC): Primary | ICD-10-CM

## 2018-08-08 ENCOUNTER — HOSPITAL ENCOUNTER (OUTPATIENT)
Dept: RADIOLOGY | Facility: MEDICAL CENTER | Age: 58
Discharge: HOME | End: 2018-08-08
Payer: COMMERCIAL

## 2018-08-08 DIAGNOSIS — I72.9 ANEURYSM (HCC): ICD-10-CM

## 2018-08-08 PROCEDURE — 71250 CT THORAX DX C-: CPT

## 2018-08-09 RX ORDER — VALACYCLOVIR HYDROCHLORIDE 1 G/1
1 TABLET, FILM COATED ORAL 3 TIMES DAILY
COMMUNITY
Start: 2017-06-23 | End: 2018-11-08 | Stop reason: ALTCHOICE

## 2018-08-09 RX ORDER — ALBUTEROL SULFATE 90 UG/1
2 AEROSOL, METERED RESPIRATORY (INHALATION) AS NEEDED
COMMUNITY
Start: 2016-07-07 | End: 2020-07-13 | Stop reason: SDUPTHER

## 2018-08-09 RX ORDER — MELOXICAM 7.5 MG/1
1 TABLET ORAL 2 TIMES DAILY
COMMUNITY
Start: 2016-08-04 | End: 2018-11-08 | Stop reason: ALTCHOICE

## 2018-08-09 RX ORDER — ACETAMINOPHEN 500 MG
2 TABLET ORAL DAILY PRN
COMMUNITY

## 2018-08-17 ENCOUNTER — OFFICE VISIT (OUTPATIENT)
Dept: CARDIAC SURGERY | Facility: CLINIC | Age: 58
End: 2018-08-17
Payer: COMMERCIAL

## 2018-08-17 VITALS
HEART RATE: 70 BPM | TEMPERATURE: 96.2 F | HEIGHT: 66 IN | BODY MASS INDEX: 20.48 KG/M2 | RESPIRATION RATE: 20 BRPM | OXYGEN SATURATION: 97 % | SYSTOLIC BLOOD PRESSURE: 140 MMHG | WEIGHT: 127.4 LBS | DIASTOLIC BLOOD PRESSURE: 68 MMHG

## 2018-08-17 DIAGNOSIS — I71.2 THORACIC AORTIC ANEURYSM WITHOUT RUPTURE (HCC): Primary | ICD-10-CM

## 2018-08-17 PROBLEM — D64.9 ANEMIA: Status: ACTIVE | Noted: 2018-08-17

## 2018-08-17 PROBLEM — I71.20 THORACIC AORTIC ANEURYSM WITHOUT RUPTURE: Status: ACTIVE | Noted: 2018-08-17

## 2018-08-17 PROCEDURE — 99213 OFFICE O/P EST LOW 20 MIN: CPT | Performed by: PHYSICIAN ASSISTANT

## 2018-08-17 NOTE — PROGRESS NOTES
Follow Up Visit - Cardiothoracic Surgery   Carlos Roe 60 y o  female MRN: [id number]    Principal Problem: Aortic aneurysm    History of Present Illness: Carlos Roe is a 60y o  year old female who presents today for ongoing surveillance of previously identified for ascending/descending thoracic aortic aneurysm  They were most recently evaluated in our office with CT imaging on 2016  At that time there was no significant interval change in the appearance of her aorta from 2015  As I sit with the patient now, she denies any CP, palpitations, SOB, ALLISON, PND, orthopnea, fluid retention, change in weight or edema  She does note that about 2 months ago she had a syncopal event at work  She was admitted to the hospital and found to be acutely anemic (Hgb 6 per patient)  She received 3uPRBC  Source of bleed was not found  GI work up negative  Since then, she has not seen her PCP for follow up  She states that she is unable to make a convenient appointment and has not further pursued follow up  She does not see a cardiologist and is on no therapy for impulse/BP control at this time       Past Medical History:  Past Medical History:   Diagnosis Date    Anemia 8/17/2018    Celiac disease     Chiari I malformation ([facility] [address] )     Hyperlipemia     Nonmelanoma skin cancer     last assessed 02/19/2015    Squamous cell cancer of skin of ala nasi     2012    Thoracic aortic aneurysm without rupture ([facility] [address] ) 8/17/2018    Thyroid disorder          Past Surgical History:   Past Surgical History:   Procedure Laterality Date    CHOLECYSTECTOMY      COLECTOMY ZEB      COLONOSCOPY      resolved 2010    CORONARY ANGIOPLASTY WITH STENT PLACEMENT      celiac artery stent    LAPAROSCOPY      large intestine excision     TRANSLUMINAL ANGIOPLASTY      intraoperative, renal artery          Family History:  Family History   Problem Relation Age of Onset    Arthritis Mother     COPD Mother     Rheum arthritis Mother     COPD Father     Rheumatic fever Father     Rheum arthritis Father     Hypertension Father     Lung cancer Father     Crohn's disease Family     Psoriasis Family     Lupus Family     Ulcerative colitis Family          Social History:    History   Alcohol Use    Yes     Comment: 1 drink per month max     History   Drug Use No     History   Smoking Status    Former Smoker   Smokeless Tobacco    Never Used       Home Medications:   Prior to Admission medications    Medication Sig Start Date End Date Taking? Authorizing Provider   acetaminophen (TYLENOL) 500 mg tablet Take 2 tablets by mouth daily as needed   Yes Historical Provider, MD   albuterol (VENTOLIN HFA) 90 mcg/act inhaler Inhale 2 puffs as needed 7/7/16  Yes Historical Provider, MD   levothyroxine 125 mcg tablet TAKE 1 TABLET DAILY IN THE MORNING  5/8/18  Yes Chanell Lares MD   Multiple Vitamins-Minerals (MULTIVITAMIN ADULT PO) Take 1 tablet by mouth daily   Yes Historical Provider, MD   sertraline (ZOLOFT) 50 mg tablet Take 1 tablet by mouth daily 5/20/15  Yes Historical Provider, MD   meloxicam (MOBIC) 7 5 mg tablet Take 1 tablet by mouth Twice daily 8/4/16   Historical Provider, MD   valACYclovir (VALTREX) 1,000 mg tablet Take 1 tablet by mouth 3 (three) times a day 6/23/17   Historical Provider, MD       Allergies: Allergies   Allergen Reactions    Morphine And Related Headache     Category: Adverse Reaction;     Statins     Nsaids Palpitations and Rash     Category: Allergy; Review of Systems:  Review of Systems   Constitutional: Negative for diaphoresis, fatigue and fever  HENT: Negative  Respiratory: Negative for apnea, chest tightness and shortness of breath  Cardiovascular: Negative for chest pain, palpitations and leg swelling  Gastrointestinal: Negative for anal bleeding, blood in stool, constipation, diarrhea and nausea  Genitourinary: Negative for dysuria, hematuria and vaginal bleeding  Musculoskeletal: Negative  Skin: Negative  Neurological: Positive for syncope, weakness and light-headedness  Negative for numbness  Psychiatric/Behavioral: Negative  Vital Signs:     Vitals:    08/17/18 1300 08/17/18 1336   BP: 140/72 140/68   BP Location: Left arm Right arm   Patient Position:  Sitting   Cuff Size: Adult Adult   Pulse: 70    Resp: 20    Temp: (!) 96 2 °F (35 7 °C)    TempSrc: Oral    SpO2: 97%    Weight: 57 8 kg (127 lb 6 4 oz)    Height: 5' 6" (1 676 m)        Physical Exam:  Physical Exam   Constitutional: She is oriented to person, place, and time  She appears well-developed and well-nourished  No distress  HENT:   Head: Normocephalic and atraumatic  Cardiovascular: Normal rate, regular rhythm and normal heart sounds  No murmur heard  Pulmonary/Chest: Effort normal and breath sounds normal  No respiratory distress  She has no wheezes  She has no rales  Musculoskeletal: She exhibits no edema or tenderness  Neurological: She is alert and oriented to person, place, and time  Skin: Skin is warm and dry  No rash noted  She is not diaphoretic  No erythema  Psychiatric: She has a normal mood and affect  Her behavior is normal  Judgment and thought content normal    Nursing note and vitals reviewed  Lab Results:                 No results found for: HGBA1C  No results found for: CKTOTAL, CKMB, CKMBINDEX, TROPONINI    Imaging Studies:     CT Chest:   IMPRESSION:     Aortic atherosclerosis and fusiform descending thoracic aortic aneurysm measuring up to 40 mm, unchanged in caliber when compared to June 13, 2016  I have personally reviewed pertinent reports  Assessment:  Patient Active Problem List    Diagnosis Date Noted    Anemia 08/17/2018    Thoracic aortic aneurysm without rupture ([facility] [address] ) 08/17/2018     Ascending aortic aneurysm;  Ongoing ascending aortic replacement workup    Plan:     CT chest, without contrast performed prior to the visit today was reviewed  Ascending aorta was measured at 36 mm at it's greatest diameter, descending aorta was 40  These findings were confirmed and shared with the patient today  As they has been no interval enlargement since 2016,  follow-up monitoring is the treatment plan  Arrangements have been made for future surveillance to be completed with CT chest, without contrast in 2 Years  Joie Baumgarten was comfortable with our recommendations, and their questions were answered to their satisfaction  Thank you for allowing us to participate in the care of this patient  Aortic Aneurysm Instructions were provided to the patient as follows:    1  No lifting more than 50 pounds  2  Maintain a controlled blood pressure with a goal of 120/80  3  Follow up in Aortic Clinic as recommended with radiology follow up as instructed  4  Report to the ER or call 911 immediately with the following signs / symptoms: sudden onset of back pain, chest pain or shortness of breath  5  Tobacco cessation discussed    In regards to her ongoing other medical issues  We have stressed the importance of finding a PCP who is able to meet her needs  She has not followed up with her PCP since her recent hospitalization due to scheduling conflicts  We have provided her with references to other PCP's to arrange follow up with  She should also be on anti-impulse/BP control   I have asked her to address this with her new PCP when she establishes care and perhaps consider seeing a cardiologist      Nick Barth: Brooke Tucker PA-C  DATE: August 17, 2018  TIME: 2:06 PM

## 2018-08-17 NOTE — LETTER
August 17, 2018     Bouchra Sánchez MD  [address], [address],Third Floor [address]    Patient: Faustina Hightower   YOB: 1960   Date of Visit: 8/17/2018       Dear Dr Marsha Major: Thank you for referring Brinda Crews to me for evaluation  Below are my notes for this consultation  If you have questions, please do not hesitate to call me  I look forward to following your patient along with you  She apparently had conflicts with the appointment times that were offered  Obviously, there is a major problem with a lack of follow through on her end since she knows that her Hgb of 6 5 and need for 3 blood transfusions is a serious issue  I suggested to her that she come back in and see you as soon as possible for follow up and any additional labwork/colonoscopy/EGD/GI referral, but I suspect she may choose to go to a different practice altogether  Sincerely,        Anna Lewis MD        CC: No Recipients  Mitch Pope PA-C  8/17/2018  2:15 PM  Attested  Follow Up Visit - Cardiothoracic Surgery   Faustina Hightower 60 y o  female MRN: [id number]    Principal Problem: Aortic aneurysm    History of Present Illness: Faustina Hightower is a 60y o  year old female who presents today for ongoing surveillance of previously identified for ascending/descending thoracic aortic aneurysm  They were most recently evaluated in our office with CT imaging on 2016  At that time there was no significant interval change in the appearance of her aorta from 2015  As I sit with the patient now, she denies any CP, palpitations, SOB, ALLISON, PND, orthopnea, fluid retention, change in weight or edema  She does note that about 2 months ago she had a syncopal event at work  She was admitted to the hospital and found to be acutely anemic (Hgb 6 per patient)  She received 3uPRBC  Source of bleed was not found  GI work up negative  Since then, she has not seen her PCP for follow up   She states that she is unable to make a convenient appointment and has not further pursued follow up  She does not see a cardiologist and is on no therapy for impulse/BP control at this time  Past Medical History:  Past Medical History:   Diagnosis Date    Anemia 8/17/2018    Celiac disease     Chiari I malformation ([facility] [address] )     Hyperlipemia     Nonmelanoma skin cancer     last assessed 02/19/2015    Squamous cell cancer of skin of ala nasi     2012    Thoracic aortic aneurysm without rupture ([facility] [address] ) 8/17/2018    Thyroid disorder          Past Surgical History:   Past Surgical History:   Procedure Laterality Date    CHOLECYSTECTOMY      COLECTOMY ZEB      COLONOSCOPY      resolved 2010    CORONARY ANGIOPLASTY WITH STENT PLACEMENT      celiac artery stent    LAPAROSCOPY      large intestine excision     TRANSLUMINAL ANGIOPLASTY      intraoperative, renal artery          Family History:  Family History   Problem Relation Age of Onset    Arthritis Mother     COPD Mother     Rheum arthritis Mother     COPD Father     Rheumatic fever Father     Rheum arthritis Father     Hypertension Father     Lung cancer Father     Crohn's disease Family     Psoriasis Family     Lupus Family     Ulcerative colitis Family          Social History:    History   Alcohol Use    Yes     Comment: 1 drink per month max     History   Drug Use No     History   Smoking Status    Former Smoker   Smokeless Tobacco    Never Used       Home Medications:   Prior to Admission medications    Medication Sig Start Date End Date Taking?  Authorizing Provider   acetaminophen (TYLENOL) 500 mg tablet Take 2 tablets by mouth daily as needed   Yes Historical Provider, MD   albuterol (VENTOLIN HFA) 90 mcg/act inhaler Inhale 2 puffs as needed 7/7/16  Yes Historical Provider, MD   levothyroxine 125 mcg tablet TAKE 1 TABLET DAILY IN THE MORNING  5/8/18  Yes Blair Blackmon MD   Multiple Vitamins-Minerals (MULTIVITAMIN ADULT PO) Take 1 tablet by mouth daily   Yes Historical Provider, MD   sertraline (ZOLOFT) 50 mg tablet Take 1 tablet by mouth daily 5/20/15  Yes Historical Provider, MD   meloxicam (MOBIC) 7 5 mg tablet Take 1 tablet by mouth Twice daily 8/4/16   Historical Provider, MD   valACYclovir (VALTREX) 1,000 mg tablet Take 1 tablet by mouth 3 (three) times a day 6/23/17   Historical Provider, MD       Allergies: Allergies   Allergen Reactions    Morphine And Related Headache     Category: Adverse Reaction;     Statins     Nsaids Palpitations and Rash     Category: Allergy; Review of Systems:  Review of Systems   Constitutional: Negative for diaphoresis, fatigue and fever  HENT: Negative  Respiratory: Negative for apnea, chest tightness and shortness of breath  Cardiovascular: Negative for chest pain, palpitations and leg swelling  Gastrointestinal: Negative for anal bleeding, blood in stool, constipation, diarrhea and nausea  Genitourinary: Negative for dysuria, hematuria and vaginal bleeding  Musculoskeletal: Negative  Skin: Negative  Neurological: Positive for syncope, weakness and light-headedness  Negative for numbness  Psychiatric/Behavioral: Negative  Vital Signs:     Vitals:    08/17/18 1300 08/17/18 1336   BP: 140/72 140/68   BP Location: Left arm Right arm   Patient Position:  Sitting   Cuff Size: Adult Adult   Pulse: 70    Resp: 20    Temp: (!) 96 2 °F (35 7 °C)    TempSrc: Oral    SpO2: 97%    Weight: 57 8 kg (127 lb 6 4 oz)    Height: 5' 6" (1 676 m)        Physical Exam:  Physical Exam   Constitutional: She is oriented to person, place, and time  She appears well-developed and well-nourished  No distress  HENT:   Head: Normocephalic and atraumatic  Cardiovascular: Normal rate, regular rhythm and normal heart sounds  No murmur heard  Pulmonary/Chest: Effort normal and breath sounds normal  No respiratory distress  She has no wheezes  She has no rales     Musculoskeletal: She exhibits no edema or tenderness  Neurological: She is alert and oriented to person, place, and time  Skin: Skin is warm and dry  No rash noted  She is not diaphoretic  No erythema  Psychiatric: She has a normal mood and affect  Her behavior is normal  Judgment and thought content normal    Nursing note and vitals reviewed  Lab Results:                 No results found for: HGBA1C  No results found for: CKTOTAL, CKMB, CKMBINDEX, TROPONINI    Imaging Studies:     CT Chest:   IMPRESSION:     Aortic atherosclerosis and fusiform descending thoracic aortic aneurysm measuring up to 40 mm, unchanged in caliber when compared to June 13, 2016  I have personally reviewed pertinent reports  Assessment:  Patient Active Problem List    Diagnosis Date Noted    Anemia 08/17/2018    Thoracic aortic aneurysm without rupture ([facility] [address] ) 08/17/2018     Ascending aortic aneurysm; Ongoing ascending aortic replacement workup    Plan:     CT chest, without contrast performed prior to the visit today was reviewed  Ascending aorta was measured at 36 mm at it's greatest diameter, descending aorta was 40  These findings were confirmed and shared with the patient today  As they has been no interval enlargement since 2016,  follow-up monitoring is the treatment plan  Arrangements have been made for future surveillance to be completed with CT chest, without contrast in 2 Years  Rose Mary Singh was comfortable with our recommendations, and their questions were answered to their satisfaction  Thank you for allowing us to participate in the care of this patient  Aortic Aneurysm Instructions were provided to the patient as follows:    1  No lifting more than 50 pounds  2  Maintain a controlled blood pressure with a goal of 120/80  3  Follow up in Aortic Clinic as recommended with radiology follow up as instructed  4   Report to the ER or call 911 immediately with the following signs / symptoms: sudden onset of back pain, chest pain or shortness of breath  5  Tobacco cessation discussed    In regards to her ongoing other medical issues  We have stressed the importance of finding a PCP who is able to meet her needs  She has not followed up with her PCP since her recent hospitalization due to scheduling conflicts  We have provided her with references to other PCP's to arrange follow up with  She should also be on anti-impulse/BP control  I have asked her to address this with her new PCP when she establishes care and perhaps consider seeing a cardiologist      Anita Cabrera: Leticia Ashley PA-C  DATE: August 17, 2018  TIME: 2:06 PM  Attestation signed by Paola Mallory MD at 8/17/2018  2:23 PM:  Pt seen and examined with PA  I agree with the above assessment and plan  This is a 2 year follow up for aortic aneurysm  CT scan demonstrates stable aortic size  I'm recommending another 2 year follow up with interval imaging  I've suggested she see her PCP or choose a new PCP rather than ignore her anemia      Dante Raza MD  DATE: August 17, 2018  TIME: 2:22 PM

## 2018-08-31 DIAGNOSIS — E03.9 ACQUIRED HYPOTHYROIDISM: ICD-10-CM

## 2018-09-03 RX ORDER — LEVOTHYROXINE SODIUM 0.12 MG/1
125 TABLET ORAL EVERY MORNING
Qty: 30 TABLET | Refills: 5 | Status: SHIPPED | OUTPATIENT
Start: 2018-09-03 | End: 2018-11-18 | Stop reason: DRUGHIGH

## 2018-09-05 DIAGNOSIS — F41.9 ANXIETY: Primary | ICD-10-CM

## 2018-09-05 NOTE — TELEPHONE ENCOUNTER
Patient called our office requesting a refill for Setraline be sent to [facility] pharmacy to [facility]  Pend rx order  Routed call to Dr Fozia Shaw to review and advise

## 2018-09-05 NOTE — TELEPHONE ENCOUNTER
Called and advised patient of medication being sent to [facility]  Patient verbalized understanding

## 2018-09-20 DIAGNOSIS — E03.9 ACQUIRED HYPOTHYROIDISM: ICD-10-CM

## 2018-09-20 RX ORDER — LEVOTHYROXINE SODIUM 0.12 MG/1
TABLET ORAL
Qty: 30 TABLET | Refills: 3 | Status: SHIPPED | OUTPATIENT
Start: 2018-09-20 | End: 2018-11-08 | Stop reason: ALTCHOICE

## 2018-11-08 ENCOUNTER — OFFICE VISIT (OUTPATIENT)
Dept: FAMILY MEDICINE CLINIC | Facility: MEDICAL CENTER | Age: 58
End: 2018-11-08
Payer: COMMERCIAL

## 2018-11-08 VITALS
BODY MASS INDEX: 20.05 KG/M2 | WEIGHT: 124.2 LBS | SYSTOLIC BLOOD PRESSURE: 124 MMHG | RESPIRATION RATE: 16 BRPM | HEART RATE: 72 BPM | DIASTOLIC BLOOD PRESSURE: 60 MMHG

## 2018-11-08 DIAGNOSIS — K90.0 CELIAC DISEASE: ICD-10-CM

## 2018-11-08 DIAGNOSIS — D64.9 ANEMIA, UNSPECIFIED TYPE: Primary | ICD-10-CM

## 2018-11-08 DIAGNOSIS — Z12.39 SCREENING FOR BREAST CANCER: ICD-10-CM

## 2018-11-08 DIAGNOSIS — E78.5 HYPERLIPIDEMIA, UNSPECIFIED HYPERLIPIDEMIA TYPE: ICD-10-CM

## 2018-11-08 DIAGNOSIS — I71.2 THORACIC AORTIC ANEURYSM WITHOUT RUPTURE (HCC): ICD-10-CM

## 2018-11-08 DIAGNOSIS — F41.8 DEPRESSION WITH ANXIETY: ICD-10-CM

## 2018-11-08 PROCEDURE — 99214 OFFICE O/P EST MOD 30 MIN: CPT | Performed by: FAMILY MEDICINE

## 2018-11-08 RX ORDER — ESCITALOPRAM OXALATE 10 MG/1
10 TABLET ORAL DAILY
Qty: 90 TABLET | Refills: 1 | Status: SHIPPED | OUTPATIENT
Start: 2018-11-08 | End: 2018-11-21 | Stop reason: SDUPTHER

## 2018-11-09 NOTE — PROGRESS NOTES
Ryder Villegas  has not been here since January 2018  She was advised a 6 week follow-up but did not do  Joana Zarate was admitted to [facility] for a syncopal episode due to a GI bleed     Initially she said that this was in September however on further questioning she thinks it was earlier this  I have a record of a blood count from [facility] in April  Was found to be very anemic with Hgb 5 4  She got 3 transfusions  She had a GI workup with colonoscopy an EGD by Dr Vieira   Biopsies of these are on the chart which were unremarkable  She was told to take iron but I do not see any other follow-up  She said she just takes an iron supplement every other day  Taking it more frequently causes constipation  She saw cardio vascular surgery Dr Braun for a 2 year follow-up for her aortic aneurysm  CT scan showed stable size  She was back in another 2 years  She continues to be under lot of stress with her son who has a drug addiction  He was in long-term for 7 months  He returned home and did well for 2 months but has since left and she does not know where he is  Tried to increase the sertraline to 100 mg as per last visit but didn't help  Has a lot of crying  Overall mood is not good  Motivation is ok  poor sleep  Staying asleep  No anhedonia  Has also used Prozac in the past     O: /60 (Cuff Size: Standard)   Pulse 72   Resp 16   Wt 56 3 kg (124 lb 3 2 oz)   BMI 20 05 kg/m²   Neck no adenopathy thyromegaly bruits  Chest clear  Cardiac regular rate without murmur  Abdomen benign    Assessment  1  S/P GI bleed-unfortunately there are very few  records were available to me at this visit  She was severely anemic with no etiology found  This is happen once prior in the past   She is taking iron although not regularly and as far as I can tell has not had any follow-up CBC done since then  2  Hypothyroidism-due for blood work  3  Aortic aneurysm-stable-  4   Depression-no longer responding to Zoloft  Will switch to Lexapro  Side effects reviewed  Suggested counseling or Darius Braxton  5  HM-due for mammogram  Had flu shot at work  She may also be getting pneumonia shots at work  Plan  Obtain records from [facility] with regard to the above  Check blood work  Mammogram   Rx for Lexapro  Recheck 1 month

## 2018-11-16 DIAGNOSIS — E03.9 ACQUIRED HYPOTHYROIDISM: ICD-10-CM

## 2018-11-17 LAB
25(OH)D3 SERPL-MCNC: 25 NG/ML (ref 30–100)
ALBUMIN SERPL-MCNC: 4.6 G/DL (ref 3.6–5.1)
ALBUMIN/GLOB SERPL: 1.7 (CALC) (ref 1–2.5)
ALP SERPL-CCNC: 78 U/L (ref 33–130)
ALT SERPL-CCNC: 17 U/L (ref 6–29)
AST SERPL-CCNC: 20 U/L (ref 10–35)
BASOPHILS # BLD AUTO: 40 CELLS/UL (ref 0–200)
BASOPHILS NFR BLD AUTO: 0.8 %
BILIRUB SERPL-MCNC: 0.4 MG/DL (ref 0.2–1.2)
BUN SERPL-MCNC: 17 MG/DL (ref 7–25)
BUN/CREAT SERPL: NORMAL (CALC) (ref 6–22)
CALCIUM SERPL-MCNC: 9.8 MG/DL (ref 8.6–10.4)
CHLORIDE SERPL-SCNC: 104 MMOL/L (ref 98–110)
CHOLEST SERPL-MCNC: 242 MG/DL
CHOLEST/HDLC SERPL: 3.5 (CALC)
CO2 SERPL-SCNC: 32 MMOL/L (ref 20–32)
CREAT SERPL-MCNC: 0.52 MG/DL (ref 0.5–1.05)
EOSINOPHIL # BLD AUTO: 110 CELLS/UL (ref 15–500)
EOSINOPHIL NFR BLD AUTO: 2.2 %
ERYTHROCYTE [DISTWIDTH] IN BLOOD BY AUTOMATED COUNT: 13.5 % (ref 11–15)
FERRITIN SERPL-MCNC: 10 NG/ML (ref 10–232)
GLOBULIN SER CALC-MCNC: 2.7 G/DL (CALC) (ref 1.9–3.7)
GLUCOSE SERPL-MCNC: 92 MG/DL (ref 65–99)
HCT VFR BLD AUTO: 37.7 % (ref 35–45)
HDLC SERPL-MCNC: 69 MG/DL
HGB BLD-MCNC: 12.5 G/DL (ref 11.7–15.5)
LDLC SERPL CALC-MCNC: 158 MG/DL (CALC)
LYMPHOCYTES # BLD AUTO: 1480 CELLS/UL (ref 850–3900)
LYMPHOCYTES NFR BLD AUTO: 29.6 %
MCH RBC QN AUTO: 29.9 PG (ref 27–33)
MCHC RBC AUTO-ENTMCNC: 33.2 G/DL (ref 32–36)
MCV RBC AUTO: 90.2 FL (ref 80–100)
MONOCYTES # BLD AUTO: 470 CELLS/UL (ref 200–950)
MONOCYTES NFR BLD AUTO: 9.4 %
NEUTROPHILS # BLD AUTO: 2900 CELLS/UL (ref 1500–7800)
NEUTROPHILS NFR BLD AUTO: 58 %
NONHDLC SERPL-MCNC: 173 MG/DL (CALC)
PLATELET # BLD AUTO: 286 THOUSAND/UL (ref 140–400)
PMV BLD REES-ECKER: 10.7 FL (ref 7.5–12.5)
POTASSIUM SERPL-SCNC: 4.1 MMOL/L (ref 3.5–5.3)
PROT SERPL-MCNC: 7.3 G/DL (ref 6.1–8.1)
RBC # BLD AUTO: 4.18 MILLION/UL (ref 3.8–5.1)
SL AMB EGFR AFRICAN AMERICAN: 121 ML/MIN/1.73M2
SL AMB EGFR NON AFRICAN AMERICAN: 105 ML/MIN/1.73M2
SODIUM SERPL-SCNC: 140 MMOL/L (ref 135–146)
TRIGL SERPL-MCNC: 53 MG/DL
TSH SERPL-ACNC: 0.03 MIU/L (ref 0.4–4.5)
WBC # BLD AUTO: 5 THOUSAND/UL (ref 3.8–10.8)

## 2018-11-18 DIAGNOSIS — E03.9 ACQUIRED HYPOTHYROIDISM: Primary | ICD-10-CM

## 2018-11-18 RX ORDER — LEVOTHYROXINE SODIUM 112 UG/1
112 TABLET ORAL DAILY
Qty: 30 TABLET | Refills: 2 | Status: SHIPPED | OUTPATIENT
Start: 2018-11-18 | End: 2019-03-04

## 2018-11-19 ENCOUNTER — TELEPHONE (OUTPATIENT)
Dept: FAMILY MEDICINE CLINIC | Facility: MEDICAL CENTER | Age: 58
End: 2018-11-19

## 2018-11-19 DIAGNOSIS — E03.9 HYPOTHYROIDISM, UNSPECIFIED TYPE: Primary | ICD-10-CM

## 2018-11-19 RX ORDER — LEVOTHYROXINE SODIUM 0.12 MG/1
125 TABLET ORAL EVERY MORNING
Qty: 90 TABLET | Refills: 1 | Status: SHIPPED | OUTPATIENT
Start: 2018-11-19 | End: 2019-02-07 | Stop reason: ALTCHOICE

## 2018-11-19 NOTE — TELEPHONE ENCOUNTER
----- Message from Evita Fishman MD sent at 11/18/2018  9:09 PM EST -----  Notify blood work shows normal hemoglobin  Not anemic  Cholesterol is elevated  Her TSH shows that her dose is too high  If she is taking 0 125 then recommend we reduce dose to  112  Will need a TSH in 6 weeks  Can discuss further at her follow-up appointment     Rx was sent to her pharmacy

## 2018-11-21 DIAGNOSIS — F41.8 DEPRESSION WITH ANXIETY: ICD-10-CM

## 2018-11-21 NOTE — TELEPHONE ENCOUNTER
Patient called the stating [facility] no longer covers her medications  Patient is wondering if you can send the Lexapro to [place] [facility] instead? Patient was to start medication last week  Removed [facility] pharmacy  Selected [facility]  Routed to Clinical to advise

## 2018-11-23 RX ORDER — ESCITALOPRAM OXALATE 10 MG/1
10 TABLET ORAL DAILY
Qty: 90 TABLET | Refills: 1 | Status: SHIPPED | OUTPATIENT
Start: 2018-11-23 | End: 2019-08-30 | Stop reason: SDUPTHER

## 2018-11-28 ENCOUNTER — TELEPHONE (OUTPATIENT)
Dept: FAMILY MEDICINE CLINIC | Facility: MEDICAL CENTER | Age: 58
End: 2018-11-28

## 2018-11-28 NOTE — TELEPHONE ENCOUNTER
Patient called the office stated she received Zoloft 50 mg and 125mcg of levothyroxine from [place]  However,  patient was told to stop Zoloft and to take 112 mcg of levothyroxine? Can this be called in to [address] [place] [address]? Patient would like a call back when this is fixed  Call back [phone]      Routed to Clinical

## 2018-11-29 NOTE — TELEPHONE ENCOUNTER
Called the Levothyroxine 112mcg into the [facility] order  Per the pharmacist at the mail order-the patient should be receiving the Lexapro tomorrow   That one was already received

## 2018-11-29 NOTE — TELEPHONE ENCOUNTER
See below  She also pushed her follow up for her thyroid medication out a little bit since she didn't even start the new dose yet   She will get her labs before that appointment

## 2019-01-06 LAB — TSH SERPL-ACNC: 0.03 MIU/L (ref 0.4–4.5)

## 2019-01-07 ENCOUNTER — TELEPHONE (OUTPATIENT)
Dept: FAMILY MEDICINE CLINIC | Facility: MEDICAL CENTER | Age: 59
End: 2019-01-07

## 2019-01-07 NOTE — TELEPHONE ENCOUNTER
----- Message from Kathy Angel MD sent at 1/6/2019  8:28 PM EST -----  Patient's TSH is unchanged from November  Did she reduce dose to   112 daily as advised? She was supposed to have a follow-up appointment last month

## 2019-01-08 NOTE — TELEPHONE ENCOUNTER
She didn't start the 112 mcg until 3 weeks ago  Please advise  I did have her make an apt here in 6 weeks  Do you want her to recheck the level?  If so , she wants order mailed to her

## 2019-01-30 ENCOUNTER — TELEPHONE (OUTPATIENT)
Dept: FAMILY MEDICINE CLINIC | Facility: MEDICAL CENTER | Age: 59
End: 2019-01-30

## 2019-02-07 ENCOUNTER — APPOINTMENT (OUTPATIENT)
Dept: RADIOLOGY | Facility: MEDICAL CENTER | Age: 59
End: 2019-02-07
Payer: COMMERCIAL

## 2019-02-07 ENCOUNTER — OFFICE VISIT (OUTPATIENT)
Dept: FAMILY MEDICINE CLINIC | Facility: MEDICAL CENTER | Age: 59
End: 2019-02-07
Payer: COMMERCIAL

## 2019-02-07 VITALS
DIASTOLIC BLOOD PRESSURE: 76 MMHG | RESPIRATION RATE: 16 BRPM | HEART RATE: 84 BPM | WEIGHT: 125.8 LBS | SYSTOLIC BLOOD PRESSURE: 130 MMHG | BODY MASS INDEX: 20.3 KG/M2

## 2019-02-07 DIAGNOSIS — E03.9 HYPOTHYROIDISM, UNSPECIFIED TYPE: Primary | ICD-10-CM

## 2019-02-07 DIAGNOSIS — R07.89 CHEST WALL PAIN: ICD-10-CM

## 2019-02-07 DIAGNOSIS — N64.4 PAIN OF LEFT BREAST: ICD-10-CM

## 2019-02-07 PROCEDURE — 99213 OFFICE O/P EST LOW 20 MIN: CPT | Performed by: FAMILY MEDICINE

## 2019-02-07 PROCEDURE — 71101 X-RAY EXAM UNILAT RIBS/CHEST: CPT

## 2019-02-08 NOTE — PROGRESS NOTES
Dossie Glass complains of pain over her left upper rib for the last few weeks  Pain aggravated by her bra  She also has some discomfort on the left side of her breast   No known trauma    O: /76 (Cuff Size: Standard)   Pulse 84   Resp 16   Wt 57 1 kg (125 lb 12 8 oz)   BMI 20 30 kg/m²    Both breasts palpate normally with no masses or discharge  Left breast there is some tenderness far laterally  There is also some tenderness just below the inferior margin of the left breast   There is a spongy mass  over the left costal margin approximately 3 finger breaths    Assessment  1  Left breast pain  2  Lipoma    Plan  Check x-ray of the ribs    Check diagnostic mammogram

## 2019-02-15 ENCOUNTER — HOSPITAL ENCOUNTER (OUTPATIENT)
Dept: MAMMOGRAPHY | Facility: CLINIC | Age: 59
Discharge: HOME | End: 2019-02-15
Payer: COMMERCIAL

## 2019-02-15 ENCOUNTER — HOSPITAL ENCOUNTER (OUTPATIENT)
Dept: ULTRASOUND IMAGING | Facility: CLINIC | Age: 59
Discharge: HOME | End: 2019-02-15
Payer: COMMERCIAL

## 2019-02-15 VITALS — HEIGHT: 67 IN | WEIGHT: 123 LBS | BODY MASS INDEX: 19.3 KG/M2

## 2019-02-15 DIAGNOSIS — N64.4 BREAST PAIN: ICD-10-CM

## 2019-02-15 DIAGNOSIS — N64.4 PAIN OF LEFT BREAST: ICD-10-CM

## 2019-02-15 PROCEDURE — 77066 DX MAMMO INCL CAD BI: CPT

## 2019-02-15 PROCEDURE — 76642 ULTRASOUND BREAST LIMITED: CPT

## 2019-02-15 PROCEDURE — G0279 TOMOSYNTHESIS, MAMMO: HCPCS

## 2019-02-17 LAB — TSH SERPL-ACNC: 0.05 MIU/L (ref 0.4–4.5)

## 2019-03-04 DIAGNOSIS — E03.9 HYPOTHYROIDISM, UNSPECIFIED TYPE: Primary | ICD-10-CM

## 2019-03-04 RX ORDER — LEVOTHYROXINE SODIUM 0.1 MG/1
100 TABLET ORAL DAILY
Qty: 90 TABLET | Refills: 0 | Status: SHIPPED | OUTPATIENT
Start: 2019-03-04 | End: 2019-05-02 | Stop reason: SDUPTHER

## 2019-03-04 NOTE — TELEPHONE ENCOUNTER
Patient is aware and will take the decreased dosage  Please send to her [address] [place] [address]) on file  Mailed her the order for the repeat TSH in 6 weeks

## 2019-03-04 NOTE — TELEPHONE ENCOUNTER
----- Message from Rolando Tran MD sent at 3/3/2019 10:55 PM EST -----  TSH still show she is getting too much thyroid  If taking 0 112 the needs to reduce to   1  Let me know where to send script  Will need a TSH in 6 weeks

## 2019-04-25 LAB — TSH SERPL-ACNC: 0.12 MIU/L (ref 0.4–4.5)

## 2019-04-30 ENCOUNTER — TELEPHONE (OUTPATIENT)
Dept: FAMILY MEDICINE CLINIC | Facility: MEDICAL CENTER | Age: 59
End: 2019-04-30

## 2019-05-02 DIAGNOSIS — E03.9 HYPOTHYROIDISM, UNSPECIFIED TYPE: ICD-10-CM

## 2019-05-02 DIAGNOSIS — E03.9 ACQUIRED HYPOTHYROIDISM: Primary | ICD-10-CM

## 2019-05-02 RX ORDER — LEVOTHYROXINE SODIUM 88 UG/1
88 TABLET ORAL DAILY
Qty: 90 TABLET | Refills: 3 | Status: SHIPPED | OUTPATIENT
Start: 2019-05-02 | End: 2019-07-14 | Stop reason: DRUGHIGH

## 2019-06-29 LAB — TSH SERPL-ACNC: 0.29 MIU/L (ref 0.4–4.5)

## 2019-07-11 ENCOUNTER — TELEPHONE (OUTPATIENT)
Dept: FAMILY MEDICINE CLINIC | Facility: MEDICAL CENTER | Age: 59
End: 2019-07-11

## 2019-07-11 NOTE — TELEPHONE ENCOUNTER
I spoke with [facility] and she is very compliant taking the 88 mcg everyday on an empty stomach  She [facility] that the [facility]

## 2019-07-11 NOTE — TELEPHONE ENCOUNTER
----- Message from Hollie Branch MD sent at 7/11/2019 11:03 AM EDT -----  Level shows thyroid dose still a little too much  Verify dose

## 2019-07-14 DIAGNOSIS — E03.9 ACQUIRED HYPOTHYROIDISM: Primary | ICD-10-CM

## 2019-07-14 RX ORDER — LEVOTHYROXINE SODIUM 0.07 MG/1
75 TABLET ORAL
Qty: 30 TABLET | Refills: 5 | Status: SHIPPED | OUTPATIENT
Start: 2019-07-14 | End: 2020-02-06 | Stop reason: SDUPTHER

## 2019-08-12 ENCOUNTER — TELEPHONE (OUTPATIENT)
Dept: FAMILY MEDICINE CLINIC | Facility: MEDICAL CENTER | Age: 59
End: 2019-08-12

## 2019-08-12 DIAGNOSIS — E03.9 HYPOTHYROIDISM, UNSPECIFIED TYPE: Primary | ICD-10-CM

## 2019-08-12 NOTE — TELEPHONE ENCOUNTER
Pt called to request bloodwork orders be put in for her because of the change in her levothyroxine  She goes to [facility], so she will need the orders sent to them

## 2019-08-30 ENCOUNTER — TELEPHONE (OUTPATIENT)
Dept: FAMILY MEDICINE CLINIC | Facility: MEDICAL CENTER | Age: 59
End: 2019-08-30

## 2019-08-30 DIAGNOSIS — F41.8 DEPRESSION WITH ANXIETY: ICD-10-CM

## 2019-08-30 NOTE — TELEPHONE ENCOUNTER
Pt is getting her TSH done next week at [facility]  Wants to add a CBC to follow up on her anemia  Needs order if ok'd

## 2019-09-02 RX ORDER — ESCITALOPRAM OXALATE 10 MG/1
10 TABLET ORAL DAILY
Qty: 90 TABLET | Refills: 1 | Status: SHIPPED | OUTPATIENT
Start: 2019-09-02 | End: 2020-02-06 | Stop reason: SDUPTHER

## 2019-09-03 DIAGNOSIS — D64.9 ANEMIA, UNSPECIFIED TYPE: ICD-10-CM

## 2019-09-03 DIAGNOSIS — E78.5 HYPERLIPIDEMIA, UNSPECIFIED HYPERLIPIDEMIA TYPE: ICD-10-CM

## 2019-09-03 DIAGNOSIS — E03.9 HYPOTHYROIDISM, UNSPECIFIED TYPE: Primary | ICD-10-CM

## 2019-09-04 LAB — TSH SERPL-ACNC: 1.95 MIU/L (ref 0.4–4.5)

## 2019-09-07 LAB
ALBUMIN SERPL-MCNC: 4.6 G/DL (ref 3.6–5.1)
ALBUMIN/GLOB SERPL: 1.9 (CALC) (ref 1–2.5)
ALP SERPL-CCNC: 59 U/L (ref 33–130)
ALT SERPL-CCNC: 17 U/L (ref 6–29)
AST SERPL-CCNC: 21 U/L (ref 10–35)
BASOPHILS # BLD AUTO: 42 CELLS/UL (ref 0–200)
BASOPHILS NFR BLD AUTO: 0.9 %
BILIRUB SERPL-MCNC: 0.4 MG/DL (ref 0.2–1.2)
BUN SERPL-MCNC: 21 MG/DL (ref 7–25)
BUN/CREAT SERPL: NORMAL (CALC) (ref 6–22)
CALCIUM SERPL-MCNC: 9.5 MG/DL (ref 8.6–10.4)
CHLORIDE SERPL-SCNC: 105 MMOL/L (ref 98–110)
CHOLEST SERPL-MCNC: 268 MG/DL
CHOLEST/HDLC SERPL: 3.7 (CALC)
CO2 SERPL-SCNC: 31 MMOL/L (ref 20–32)
CREAT SERPL-MCNC: 0.66 MG/DL (ref 0.5–0.99)
EOSINOPHIL # BLD AUTO: 132 CELLS/UL (ref 15–500)
EOSINOPHIL NFR BLD AUTO: 2.8 %
ERYTHROCYTE [DISTWIDTH] IN BLOOD BY AUTOMATED COUNT: 14 % (ref 11–15)
GLOBULIN SER CALC-MCNC: 2.4 G/DL (CALC) (ref 1.9–3.7)
GLUCOSE SERPL-MCNC: 88 MG/DL (ref 65–99)
HCT VFR BLD AUTO: 37.4 % (ref 35–45)
HDLC SERPL-MCNC: 72 MG/DL
HGB BLD-MCNC: 12 G/DL (ref 11.7–15.5)
LDLC SERPL CALC-MCNC: 183 MG/DL (CALC)
LYMPHOCYTES # BLD AUTO: 1278 CELLS/UL (ref 850–3900)
LYMPHOCYTES NFR BLD AUTO: 27.2 %
MCH RBC QN AUTO: 29 PG (ref 27–33)
MCHC RBC AUTO-ENTMCNC: 32.1 G/DL (ref 32–36)
MCV RBC AUTO: 90.3 FL (ref 80–100)
MONOCYTES # BLD AUTO: 418 CELLS/UL (ref 200–950)
MONOCYTES NFR BLD AUTO: 8.9 %
NEUTROPHILS # BLD AUTO: 2829 CELLS/UL (ref 1500–7800)
NEUTROPHILS NFR BLD AUTO: 60.2 %
NONHDLC SERPL-MCNC: 196 MG/DL (CALC)
PLATELET # BLD AUTO: 310 THOUSAND/UL (ref 140–400)
PMV BLD REES-ECKER: 10.6 FL (ref 7.5–12.5)
POTASSIUM SERPL-SCNC: 4.1 MMOL/L (ref 3.5–5.3)
PROT SERPL-MCNC: 7 G/DL (ref 6.1–8.1)
RBC # BLD AUTO: 4.14 MILLION/UL (ref 3.8–5.1)
SL AMB EGFR AFRICAN AMERICAN: 111 ML/MIN/1.73M2
SL AMB EGFR NON AFRICAN AMERICAN: 96 ML/MIN/1.73M2
SODIUM SERPL-SCNC: 142 MMOL/L (ref 135–146)
TRIGL SERPL-MCNC: 42 MG/DL
WBC # BLD AUTO: 4.7 THOUSAND/UL (ref 3.8–10.8)

## 2019-09-19 ENCOUNTER — TELEPHONE (OUTPATIENT)
Dept: FAMILY MEDICINE CLINIC | Facility: MEDICAL CENTER | Age: 59
End: 2019-09-19

## 2019-12-19 ENCOUNTER — OFFICE VISIT (OUTPATIENT)
Dept: FAMILY MEDICINE CLINIC | Facility: MEDICAL CENTER | Age: 59
End: 2019-12-19
Payer: COMMERCIAL

## 2019-12-19 VITALS
DIASTOLIC BLOOD PRESSURE: 64 MMHG | RESPIRATION RATE: 16 BRPM | SYSTOLIC BLOOD PRESSURE: 130 MMHG | WEIGHT: 125.2 LBS | HEART RATE: 60 BPM | BODY MASS INDEX: 19.61 KG/M2

## 2019-12-19 DIAGNOSIS — I71.2 THORACIC AORTIC ANEURYSM WITHOUT RUPTURE (HCC): ICD-10-CM

## 2019-12-19 DIAGNOSIS — M54.2 CERVICALGIA: ICD-10-CM

## 2019-12-19 DIAGNOSIS — E78.01 FAMILIAL HYPERCHOLESTEROLEMIA: ICD-10-CM

## 2019-12-19 DIAGNOSIS — F41.8 DEPRESSION WITH ANXIETY: ICD-10-CM

## 2019-12-19 DIAGNOSIS — D64.9 ANEMIA, UNSPECIFIED TYPE: ICD-10-CM

## 2019-12-19 DIAGNOSIS — K52.9 COLITIS: ICD-10-CM

## 2019-12-19 DIAGNOSIS — E03.9 HYPOTHYROIDISM, UNSPECIFIED TYPE: Primary | ICD-10-CM

## 2019-12-19 PROCEDURE — 99214 OFFICE O/P EST MOD 30 MIN: CPT | Performed by: FAMILY MEDICINE

## 2019-12-19 RX ORDER — METHOCARBAMOL 500 MG/1
500 TABLET, FILM COATED ORAL 3 TIMES DAILY
Qty: 60 TABLET | Refills: 0 | Status: SHIPPED | OUTPATIENT
Start: 2019-12-19 | End: 2020-07-09 | Stop reason: ALTCHOICE

## 2019-12-19 RX ORDER — HALOBETASOL PROPIONATE 0.05 %
OINTMENT (GRAM) TOPICAL
COMMUNITY
End: 2020-07-09 | Stop reason: ALTCHOICE

## 2019-12-20 ENCOUNTER — TELEPHONE (OUTPATIENT)
Dept: FAMILY MEDICINE CLINIC | Facility: MEDICAL CENTER | Age: 59
End: 2019-12-20

## 2019-12-20 NOTE — PROGRESS NOTES
Heraclio Leon is here for f/u hypothyrodism  Taking levothyroxine 75 ug   Feeling better  Compliant  Saw Colorectal surgery Dr Tiffanie Brooks for colonoscopy this year  Diagnosed with ileocolitis  She was tried on prednisone andApriso but could not tolerate side effects  She did not go back for follow-up  She continues to have chronic diarrhea  She asks for referral closer to her home  She complains of 1 year history of left-sided neck pain  She will get sharp stabbing pains  Goes down into her arm  There is no numbness or tingling in the upper extremities  Can be aggravated by certain movements  She was concerned it was due to her thyroid  She says that she does eat a healthy diet  Eats fruits and vegetables and avoids red meat  Does not eat out a lot  Avoids ice cream and pork products  She says she has a family history of heart disease and hyperlipidemia  She saw cardiothoracic surgery in 2018 for follow-up of her thoracic aortic aneurysm  She is due for follow-up in 2020  Still under lot of stress with her son who is now incarcerated  O: /64 (Cuff Size: Standard)   Pulse 60   Resp 16   Wt 56 8 kg (125 lb 3 2 oz)   BMI 19 61 kg/m²   Neck no adenopathy thyromegaly bruits  Chest clear with good breath sounds  Cardiac regular rate without murmur    Blood work 9/6/19  CBC CMP TSH within normal limits  Lipid profile   cholesterol 268    HDL 72    Assessment  1  Hypothyroidism-after several dosage adjustments she is now controlled with 75 mcg daily  2  Hyperlipidemia-likely genetic  Cardiovascular risk assessment shows her at 3 8 percent  She declines statin therapy and and it is not indicated at this time  Given low-fat diet information and will repeat 1 year  3  Ileocolitis-still symptomatic  Encouraged follow-up especially in light of her history of anemia  Requests referral to Colorectal surgery at [facility]  4  Anemia-resolved    However will need follow-up in light of her colitis  5  Cervicalgia-will give trial of a muscle relaxant  Discussed range of motion  May need imaging  6  Depression-taking  Lexapro  Still under lot of stress with son's addiction and incarceration  Will discuss further next visit  7  Health maintenance-immunizations up-to-date    Plan  Colorectal surgeon referral   TSH 6 months  Recheck 6 months          Assessment

## 2019-12-20 NOTE — TELEPHONE ENCOUNTER
Please see fax- asking for 90 day supply of Robaxin since it is a mail order, if you want less then 90 day if has to be sent to a local pharmacy   Please advise  Form needs to be sent back with your decision    In your inbox

## 2019-12-23 NOTE — TELEPHONE ENCOUNTER
[facility]-  DR Johnnie Vizcarra thinks she should try the medication first and get at a local [facility]ar[facility] , but its up to her  Either way , Dr Joann Anton will have to send a new Rx , will need to send to it back to her

## 2019-12-24 NOTE — TELEPHONE ENCOUNTER
Spoke with Tr Charles, She said that she will just hold off  She does not need or want to do 90 pills and cannot use a local with her insurance  She said she will just take tylenol and if she changes her mind, she will call back

## 2020-02-06 DIAGNOSIS — E03.9 ACQUIRED HYPOTHYROIDISM: ICD-10-CM

## 2020-02-06 DIAGNOSIS — F41.8 DEPRESSION WITH ANXIETY: ICD-10-CM

## 2020-02-06 NOTE — TELEPHONE ENCOUNTER
Pt called to request Rxs for escitalopram and levothyroxine    Please send 90 day supplies to [facility] in [place]

## 2020-02-10 RX ORDER — ESCITALOPRAM OXALATE 10 MG/1
10 TABLET ORAL DAILY
Qty: 90 TABLET | Refills: 1 | Status: SHIPPED | OUTPATIENT
Start: 2020-02-10 | End: 2020-08-16

## 2020-02-10 RX ORDER — LEVOTHYROXINE SODIUM 0.07 MG/1
75 TABLET ORAL
Qty: 90 TABLET | Refills: 1 | Status: SHIPPED | OUTPATIENT
Start: 2020-02-10 | End: 2020-07-22 | Stop reason: ALTCHOICE

## 2020-06-09 LAB — TSH SERPL-ACNC: 10.92 MIU/L (ref 0.4–4.5)

## 2020-06-11 ENCOUNTER — TELEPHONE (OUTPATIENT)
Dept: FAMILY MEDICINE CLINIC | Facility: MEDICAL CENTER | Age: 60
End: 2020-06-11

## 2020-06-11 DIAGNOSIS — E03.9 HYPOTHYROIDISM, UNSPECIFIED TYPE: Primary | ICD-10-CM

## 2020-06-12 ENCOUNTER — TELEPHONE (OUTPATIENT)
Dept: ENDOCRINOLOGY | Facility: CLINIC | Age: 60
End: 2020-06-12

## 2020-07-09 ENCOUNTER — OFFICE VISIT (OUTPATIENT)
Dept: FAMILY MEDICINE CLINIC | Facility: MEDICAL CENTER | Age: 60
End: 2020-07-09
Payer: COMMERCIAL

## 2020-07-09 VITALS
RESPIRATION RATE: 16 BRPM | HEIGHT: 67 IN | WEIGHT: 128.6 LBS | SYSTOLIC BLOOD PRESSURE: 122 MMHG | HEART RATE: 84 BPM | BODY MASS INDEX: 20.18 KG/M2 | DIASTOLIC BLOOD PRESSURE: 60 MMHG | TEMPERATURE: 99.1 F

## 2020-07-09 DIAGNOSIS — L98.9 SKIN LESION OF CHEEK: ICD-10-CM

## 2020-07-09 DIAGNOSIS — E03.9 HYPOTHYROIDISM, UNSPECIFIED TYPE: Primary | ICD-10-CM

## 2020-07-09 PROCEDURE — 99213 OFFICE O/P EST LOW 20 MIN: CPT | Performed by: FAMILY MEDICINE

## 2020-07-09 RX ORDER — LEVOTHYROXINE SODIUM 0.07 MG/1
75 TABLET ORAL
Qty: 30 TABLET | Refills: 5 | Status: SHIPPED | OUTPATIENT
Start: 2020-07-09 | End: 2020-09-15 | Stop reason: SDUPTHER

## 2020-07-09 NOTE — PROGRESS NOTES
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   She says her energy level is poor but she has been under a lot of stress  However her son is doing much better  Sober and working  She says she  has a spot on her right cheek  Was itchy but not now  Her levothyroxine dose was decreased last time due to persistently low TSH  Dose has needed decrease from 125 to 75 ug over the past 2 years  TSH is now 10 since  decrease from 88 to 75  Compliant  We referred to endo but no appt until Sept    O: /60 (Cuff Size: Standard)   Pulse 84   Temp 99 1 °F (37 3 °C)   Resp 16   Ht 5' 7" (1 702 m)   Wt 58 3 kg (128 lb 9 6 oz)   BMI 20 14 kg/m²   Right cheek with pick irregular papular lesion 1 cm    Assessment  1  Hypothyroidism-ahs been difficult to adjust despite patient compliance  Will try brand Synthroid until she can see endo  Also consider altnerate dose with 75/50 if this is not helpful  2  Skin lesion right cheek-will refer derm  3  Stress-discusssed    Plan  As above  Endo referral  TSH in 1 month

## 2020-07-13 DIAGNOSIS — J45.20 ASTHMA IN ADULT, MILD INTERMITTENT, UNCOMPLICATED: Primary | ICD-10-CM

## 2020-07-13 NOTE — TELEPHONE ENCOUNTER
Pt called to ask if her Rxs had been sent in yet  She went to [facility] on Saturday to pick them up and nothing was there  She requested Rxs for synthroid instead of levothyroxine, as well as her albuterol inhaler      Please send to [facility] in [place]

## 2020-07-14 ENCOUNTER — TELEPHONE (OUTPATIENT)
Dept: FAMILY MEDICINE CLINIC | Facility: MEDICAL CENTER | Age: 60
End: 2020-07-14

## 2020-07-15 RX ORDER — ALBUTEROL SULFATE 90 UG/1
2 AEROSOL, METERED RESPIRATORY (INHALATION) AS NEEDED
Qty: 1 INHALER | Refills: 2 | Status: SHIPPED | OUTPATIENT
Start: 2020-07-15 | End: 2021-09-17

## 2020-07-15 NOTE — TELEPHONE ENCOUNTER
Detailed message left  , [facility]   If they still say they do not have it call us back [facility]    Other Rx was sent as well

## 2020-07-15 NOTE — TELEPHONE ENCOUNTER
I did send in brand synthroid on  7/9--it is on the chart and it says the pharmacy received it   [facility]   Will send albuterol

## 2020-07-22 ENCOUNTER — TELEMEDICINE (OUTPATIENT)
Dept: FAMILY MEDICINE CLINIC | Facility: MEDICAL CENTER | Age: 60
End: 2020-07-22
Payer: COMMERCIAL

## 2020-07-22 ENCOUNTER — TELEPHONE (OUTPATIENT)
Dept: FAMILY MEDICINE CLINIC | Facility: MEDICAL CENTER | Age: 60
End: 2020-07-22

## 2020-07-22 VITALS — BODY MASS INDEX: 19.51 KG/M2 | HEIGHT: 67 IN | WEIGHT: 124.3 LBS | TEMPERATURE: 100 F

## 2020-07-22 DIAGNOSIS — R05.9 COUGH: Primary | ICD-10-CM

## 2020-07-22 PROCEDURE — 99441 PR PHYS/QHP TELEPHONE EVALUATION 5-10 MIN: CPT | Performed by: FAMILY MEDICINE

## 2020-07-22 PROCEDURE — 3008F BODY MASS INDEX DOCD: CPT | Performed by: FAMILY MEDICINE

## 2020-07-22 NOTE — PROGRESS NOTES
Virtual Brief Visit    Assessment/Plan:    Problem List Items Addressed This Visit     None                Reason for visit is   Chief Complaint   Patient presents with    Fatigue     started [facility] in a [facility] home where there was positive COVID    Chills    Fever        Encounter provider José Miguel Rivero MD    Provider located at [address] [place] [id number]    Recent Visits  No visits were found meeting these conditions  Showing recent visits within past 7 days and meeting all other requirements     Today's Visits  Date Type Provider Dept   07/22/20 Telemedicine José Miguel Rivero MD [facility]   07/22/20 Telephone José Miguel Rivero MD [facility]   Showing today's visits and meeting all other requirements     Future Appointments  Date Type Provider Dept   07/22/20 Telemedicine José Miguel Rivero MD [facility]   Showing future appointments within next 150 days and meeting all other requirements        After connecting through telephone, the patient was identified by name and date of birth  Duane Samuels was informed that this is a telemedicine visit and that the visit is being conducted through telephone  My office door was closed  No one else was in the room  She acknowledged consent and understanding of privacy and security of the platform  The patient has agreed to participate and understands she can discontinue the visit at any time  Patient is aware this is a billable service  Richelle Mora is a 63 y o  female who is being evaluated for possible COVID infection  [facility] AN[facility]YA Uribe works at [employer] as   She started with symptoms 3 days ago  She initially started with diarrhea followed by fatigue, headache, nausea and loss of appetite    Last night she developed chest discomfort which was pleuritic in nature and located on the right side of her chest   She has a cough  She has shortness of breath when she is walking  She feels achy all over  She has no appetite  There is no sore throat, nasal congestion, her fever is   Due to my concerns regarding her dyspnea and chest pain I did refer her to the emergency room for who she further evaluation  She prefers to go to [facility]  We contacted them regarding her  Past Medical History:   Diagnosis Date    Anemia 8/17/2018    Celiac disease     Chiari I malformation ([facility] [place] [address] [facility]     Hyperlipemia     Nonmelanoma skin cancer     last assessed 02/19/2015    Squamous cell cancer of skin of ala nasi     2012    Thoracic aortic aneurysm without rupture ([facility] [address] ) 8/17/2018    Thyroid disorder        Past Surgical History:   Procedure Laterality Date    CHOLECYSTECTOMY      COLECTOMY ZEB      COLONOSCOPY      resolved 2010    CORONARY ANGIOPLASTY WITH STENT PLACEMENT      celiac artery stent    HYSTERECTOMY  2001    LAPAROSCOPY      large intestine excision     OOPHORECTOMY Bilateral 2001    TRANSLUMINAL ANGIOPLASTY      intraoperative, renal artery        Current Outpatient Medications   Medication Sig Dispense Refill    acetaminophen (TYLENOL) 500 mg tablet Take 2 tablets by mouth daily as needed      albuterol (Ventolin HFA) 90 mcg/act inhaler Inhale 2 puffs as needed for wheezing 1 Inhaler 2    escitalopram (LEXAPRO) 10 mg tablet Take 1 tablet (10 mg total) by mouth daily 90 tablet 1    levothyroxine 75 mcg tablet Take 1 tablet (75 mcg total) by mouth daily in the early morning Needs BRAND Synthroid 30 tablet 5    Multiple Vitamins-Minerals (MULTIVITAMIN ADULT PO) Take 1 tablet by mouth daily       No current facility-administered medications for this visit  Allergies   Allergen Reactions    Morphine And Related Headache     Category: Adverse Reaction;     Statins     Nsaids Palpitations and Rash     Category: Allergy;         Review of Systems    Vitals:    07/22/20 1544 Temp: 100 °F (37 8 °C)   Weight: 56 4 kg (124 lb 4 8 oz)   Height: 5' 7" (1 702 m)         I spent 10 minutes directly with the patient during this visit    Judah Cam acknowledges that she has consented to an online visit or consultation  She understands that the online visit is based solely on information provided by her, and that, in the absence of a face-to-face physical evaluation by the physician, the diagnosis she receives is both limited and provisional in terms of accuracy and completeness  This is not intended to replace a full medical face-to-face evaluation by the physician  Henrene Severs understands and accepts these terms

## 2020-07-22 NOTE — TELEPHONE ENCOUNTER
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 She has Android

## 2020-08-07 LAB — TSH SERPL-ACNC: 6.27 MIU/L (ref 0.4–4.5)

## 2020-08-14 DIAGNOSIS — F41.8 DEPRESSION WITH ANXIETY: ICD-10-CM

## 2020-08-16 RX ORDER — ESCITALOPRAM OXALATE 10 MG/1
TABLET ORAL
Qty: 90 TABLET | Refills: 1 | Status: SHIPPED | OUTPATIENT
Start: 2020-08-16 | End: 2021-02-27

## 2020-08-17 DIAGNOSIS — I71.2 THORACIC AORTIC ANEURYSM WITHOUT RUPTURE (HCC): Primary | ICD-10-CM

## 2020-09-02 ENCOUNTER — CONSULT (OUTPATIENT)
Dept: ENDOCRINOLOGY | Facility: CLINIC | Age: 60
End: 2020-09-02
Payer: COMMERCIAL

## 2020-09-02 VITALS
HEART RATE: 64 BPM | TEMPERATURE: 98.8 F | DIASTOLIC BLOOD PRESSURE: 68 MMHG | SYSTOLIC BLOOD PRESSURE: 140 MMHG | BODY MASS INDEX: 19.78 KG/M2 | HEIGHT: 67 IN | WEIGHT: 126 LBS

## 2020-09-02 DIAGNOSIS — E55.9 VITAMIN D DEFICIENCY: ICD-10-CM

## 2020-09-02 DIAGNOSIS — M81.0 AGE-RELATED OSTEOPOROSIS WITHOUT CURRENT PATHOLOGICAL FRACTURE: ICD-10-CM

## 2020-09-02 DIAGNOSIS — E03.9 ACQUIRED HYPOTHYROIDISM: Primary | ICD-10-CM

## 2020-09-02 PROCEDURE — 99244 OFF/OP CNSLTJ NEW/EST MOD 40: CPT | Performed by: INTERNAL MEDICINE

## 2020-09-02 NOTE — PATIENT INSTRUCTIONS
Take vitamin D3 supplementation, 4000 International Units daily  Take calcium supplementation, 500 mg daily with dinner

## 2020-09-02 NOTE — PROGRESS NOTES
Duane Samuels 63 y o  female MRN: [id number]    Encounter: [id number]      Assessment/Plan     Assessment: This is a 63y o -year-old female with hypothyroidism, osteoporosis, vitamin-D deficiency and celiac disease    Plan:      Diagnoses and all orders for this visit:    Acquired hypothyroidism  Patient was switched to brand Synthroid 4 weeks ago  Discussed to repeat blood work in 1 week, and based on the results will have to make adjustment  Discussed to take Synthroid on empty stomach 1 hour before breakfast and 4 hour apart from vitamin-D or calcium supplementation  -     Ambulatory referral to Endocrinology  -     TSH, 3rd generation; Future  -     T4, free; Future  -     T4, free; Future  -     TSH, 3rd generation; Future    Age-related osteoporosis without current pathological fracture  Based on DEXA scan in 2018 she has osteoporosis, T-score at lumbar spine was -2 5  No history of major fractures  Will repeat following blood work, including vitamin-D, PTH as well as DEXA scan as she is due for DEXA scan now  Discussed different options for medical management for osteoporosis, patient is leaning to words IV infusion Reclast, because of drug holiday  She is not anticipating any major dental work  Once we have a blood work results back as well as DEXA scan, will put the orders for Reclast   If her vitamin-D is low, will have to replace the vitamin-D to normal range before we start Reclast infusion   -     PTH, intact- Lab Collect; Future  -     Vitamin D 25 hydroxy; Future  -     Protein electrophoresis, serum Lab Collect; Future  -     DXA bone density spine hip and pelvis; Future  -     Basic metabolic panel; Future    Vitamin D deficiency  Order vitamin-D level  Discussed to start vitamin-D 3 supplementation 4000 International Units daily and calcium supplementation 500 mg with dinner    -     Vitamin D 25 hydroxy;  Future        CC:   Hypothyroidism, osteoporosis, vitamin-D deficiency    History of Present Illness     HPI:    Emma Dow is 56-year-old woman with medical history of osteoporosis, hypothyroidism with fluctuating thyroid blood work results, hyperlipidemia is here for evaluation of hypothyroidism as well as osteoporosis  She has history of longstanding hypothyroidism for which she is currently taking brand Synthroid 75 mcg daily on empty stomach 1 hour before breakfast   She was recently switched to brand Synthroid 4 weeks ago because of her history of celiac disease  She is currently on gluten free diet    She denies any history of fracture bones  She has family history of osteoporosis  Currently she is not taking any vitamin D or calcium supplementation  She takes 1 multivitamin daily    CLINICAL HISTORY:   61year old post-menopausal  female risk factors include arthritis, hypothyroidism, previous fracture  Screening for osteoporosis       TECHNIQUE: Bone densitometry was performed using a Horizon A bone densitometer  Regions of interest appear properly placed  There are no obvious fractures or other confounding variables which could limit the study            COMPARISON:  None       RESULTS:    LUMBAR SPINE:  L1-L4:   BMD 0 772 gm/cm2   T-score -2 5   Z-score -1 2      LEFT TOTAL HIP:   BMD 0 701 gm/cm2   T-score -2 0   Z-score -0 8      LEFT FEMORAL NECK:   BMD 0 628 gm/cm2   T-score -2 0   Z-score -0 8                  IMPRESSION:   1  Based on the [facility] classification, the T-score of -2 5 in the lumbar spine is consistent with osteoporosis  2   According to the [facility], prescription therapy is recommended with a T-score of -2 5 or less in the spine or hip after appropriate evaluation to exclude secondary causes     3   A daily intake of at least 1200 mg Calcium and 800 to 1000 IU of Vitamin D, as well as weight bearing and muscle strengthening exercise, fall prevention and avoidance of tobacco and excessive alcohol intake as  basic preventive measures are    suggested  4   Repeat DXA  in 18 - 24 months, on the same machine, as clinically indicated      Review of Systems   Constitutional: Negative for activity change, diaphoresis, fatigue, fever and unexpected weight change  HENT: Negative  Eyes: Negative for visual disturbance  Respiratory: Negative for cough, chest tightness and shortness of breath  Cardiovascular: Negative for chest pain, palpitations and leg swelling  Gastrointestinal: Positive for blood in stool, constipation and diarrhea  Negative for abdominal pain, nausea and vomiting  Endocrine: Negative for cold intolerance, heat intolerance, polydipsia, polyphagia and polyuria  Genitourinary: Negative for dysuria, enuresis, frequency and urgency  Musculoskeletal: Negative for arthralgias and myalgias  Skin: Negative for pallor, rash and wound  Allergic/Immunologic: Negative  Neurological: Negative for dizziness, tremors, weakness and numbness  Hematological: Negative  Psychiatric/Behavioral: Negative          Historical Information   Past Medical History:   Diagnosis Date    Anemia 8/17/2018    Celiac disease     [facility]ari I malformation ([facility] [address] [facility]     Hyperlipemia     Nonmelanoma skin cancer     last assessed 02/19/2015    Squamous cell cancer of skin of ala nasi     2012    Thoracic aortic aneurysm without rupture ([facility] [address] ) 8/17/2018    Thyroid disorder      Past Surgical History:   Procedure Laterality Date    CHOLECYSTECTOMY      COLECTOMY ZEB      COLONOSCOPY      resolved 2010    CORONARY ANGIOPLASTY WITH STENT PLACEMENT      celiac artery stent    HYSTERECTOMY  2001    LAPAROSCOPY      large intestine excision     OOPHORECTOMY Bilateral 2001    TRANSLUMINAL ANGIOPLASTY      intraoperative, renal artery      Social History   Social History     Substance and Sexual Activity   Alcohol Use Yes    Comment: 1 drink per month max     Social History     Substance and Sexual Activity Drug Use No     Social History     Tobacco Use   Smoking Status Former Smoker   Smokeless Tobacco Never Used     Family History:   Family History   Problem Relation Age of Onset    Arthritis Mother     COPD Mother     Rheum arthritis Mother     COPD Father     Rheumatic fever Father     Rheum arthritis Father     Hypertension Father     Lung cancer Father     Crohn's disease Family     Psoriasis Family     Lupus Family     Ulcerative colitis Family     Breast cancer Cousin        Meds/Allergies   Current Outpatient Medications   Medication Sig Dispense Refill    acetaminophen (TYLENOL) 500 mg tablet Take 2 tablets by mouth daily as needed      albuterol (Ventolin HFA) 90 mcg/act inhaler Inhale 2 puffs as needed for wheezing 1 Inhaler 2    escitalopram (LEXAPRO) 10 mg tablet TAKE 1 TABLET BY MOUTH EVERY DAY 90 tablet 1    levothyroxine 75 mcg tablet Take 1 tablet (75 mcg total) by mouth daily in the early morning Needs BRAND Synthroid 30 tablet 5    Multiple Vitamins-Minerals (MULTIVITAMIN ADULT PO) Take 1 tablet by mouth daily       No current facility-administered medications for this visit  Allergies   Allergen Reactions    Morphine And Related Headache     Category: Adverse Reaction;     Statins     Nsaids Palpitations and Rash     Category: Allergy;        Objective   Vitals: Blood pressure 140/68, pulse 64, temperature 98 8 °F (37 1 °C), height 5' 7" (1 702 m), weight 57 2 kg (126 lb), not currently breastfeeding  Physical Exam  Vitals signs reviewed  Constitutional:       General: She is not in acute distress  Appearance: She is well-developed  She is not diaphoretic  HENT:      Head: Normocephalic and atraumatic  Eyes:      General:         Right eye: No discharge  Left eye: No discharge  Conjunctiva/sclera: Conjunctivae normal    Neck:      Musculoskeletal: Normal range of motion and neck supple  Thyroid: No thyromegaly     Cardiovascular:      Rate and Rhythm: Normal rate and regular rhythm  Heart sounds: Normal heart sounds  No murmur  Pulmonary:      Effort: Pulmonary effort is normal  No respiratory distress  Breath sounds: Normal breath sounds  No wheezing  Abdominal:      General: Bowel sounds are normal  There is no distension  Palpations: Abdomen is soft  Musculoskeletal: Normal range of motion  General: No tenderness or deformity  Skin:     General: Skin is warm and dry  Findings: No erythema or rash  Neurological:      General: No focal deficit present  Mental Status: She is alert and oriented to person, place, and time  Cranial Nerves: No cranial nerve deficit  Motor: No abnormal muscle tone  Deep Tendon Reflexes: Reflexes are normal and symmetric  Reflexes normal    Psychiatric:         Behavior: Behavior normal          The history was obtained from the review of the chart, patient  Lab Results:        Imaging Studies:        I have personally reviewed pertinent reports  Portions of the record may have been created with voice recognition software  Occasional wrong word or "sound a like" substitutions may have occurred due to the inherent limitations of voice recognition software  Read the chart carefully and recognize, using context, where substitutions have occurred

## 2020-09-03 ENCOUNTER — HOSPITAL ENCOUNTER (OUTPATIENT)
Dept: RADIOLOGY | Facility: MEDICAL CENTER | Age: 60
Discharge: HOME | End: 2020-09-03
Payer: COMMERCIAL

## 2020-09-03 DIAGNOSIS — I71.2 THORACIC AORTIC ANEURYSM WITHOUT RUPTURE (HCC): ICD-10-CM

## 2020-09-03 PROCEDURE — G1004 CDSM NDSC: HCPCS

## 2020-09-03 PROCEDURE — 71250 CT THORAX DX C-: CPT

## 2020-09-11 ENCOUNTER — OFFICE VISIT (OUTPATIENT)
Dept: CARDIAC SURGERY | Facility: CLINIC | Age: 60
End: 2020-09-11
Payer: COMMERCIAL

## 2020-09-11 VITALS
BODY MASS INDEX: 19.78 KG/M2 | WEIGHT: 126 LBS | HEART RATE: 75 BPM | RESPIRATION RATE: 19 BRPM | HEIGHT: 67 IN | DIASTOLIC BLOOD PRESSURE: 64 MMHG | SYSTOLIC BLOOD PRESSURE: 118 MMHG | TEMPERATURE: 98 F

## 2020-09-11 DIAGNOSIS — I71.2 THORACIC AORTIC ANEURYSM WITHOUT RUPTURE (HCC): Primary | ICD-10-CM

## 2020-09-11 PROCEDURE — 99214 OFFICE O/P EST MOD 30 MIN: CPT | Performed by: PHYSICIAN ASSISTANT

## 2020-09-11 PROCEDURE — 1036F TOBACCO NON-USER: CPT | Performed by: PHYSICIAN ASSISTANT

## 2020-09-11 NOTE — LETTER
2020     Lisset Botello MD  [facility] [address]    Patient: Ariel Lares   YOB: 1960   Date of Visit: 2020       Dear Dr Katharine Akhtar: Thank you for referring Mayito De to me for evaluation  Below are my notes for this consultation  If you have questions, please do not hesitate to call me  I look forward to following your patient along with you  Sincerely,        Alexa Wisdom MD        CC: No Recipients  Orlando Betancourt  2020  3:26 PM  Attested  Consultation - Cardiothoracic Surgery   Ariel Lares 63 y o  female MRN: [id number]    Reason for Consult / Principal Problem: Aortic aneurysm    History of Present Illness: Ariel Lares is a 63y o  year old female who presents to the aortic clinic for a 2 year follow-up with scan  Her last visit was on 2018 which the aneurysm was found to be 40  Upon radiologic examination today, the aneurysm is found to be 40 & stable in descending portion  She admits to abiding by her lifting & exertion restrictions since her last visit  She is active & reports walking daily  She denies chest pain, palpitations, SOB, ALLISON, LE edema b/l, back pain, arm pain, numbness/tingling/paresthesias in UE b/l, HA, lightheadedness, dizziness, presyncopal symptoms, or syncopal events today or since her last visit  Changes to her medical history since her last visit consist of colonoscopy w/ bx showing ileocolitis from Celiac disease causing anemia (has not worked much on diet per patient, no BRBPR or hematochezia or melena, no need for more transfusions, last Hb at [facility] 2020 11 1), seeing endocrinology for hypothyroidism, has had some follow-up issues w/ PCP & colorectal per notation, ED for viral illness w/ incidental UTI   She admits to her maternal uncle being dx w/ aneurysm she believes in chest which needed an operation however he  prior to any intervention of an unknown cause  Her PCP Dr Keely Chavez manages her bp & she is not on any medications  Denies tobacco use, ETOH use, or drug use of any kind  Past Medical History:  Past Medical History:   Diagnosis Date    Anemia 8/17/2018    Celiac disease     [facility] malformation ([facility] [address]     Hyperlipemia     Nonmelanoma skin cancer     last assessed 02/19/2015    Squamous cell cancer of skin of ala nasi     2012    Thoracic aortic aneurysm without rupture ([facility] [address] ) 8/17/2018    Thyroid disorder      Past Surgical History:   Past Surgical History:   Procedure Laterality Date    CHOLECYSTECTOMY      COLECTOMY ZEB      COLONOSCOPY      resolved 2010    CORONARY ANGIOPLASTY WITH STENT PLACEMENT      celiac artery stent    HYSTERECTOMY  2001    LAPAROSCOPY      large intestine excision     OOPHORECTOMY Bilateral 2001    TRANSLUMINAL ANGIOPLASTY      intraoperative, renal artery      Family History:  Family History   Problem Relation Age of Onset    Arthritis Mother     COPD Mother     Rheum arthritis Mother     COPD Father     Rheumatic fever Father     Rheum arthritis Father     Hypertension Father     Lung cancer Father     Crohn's disease Family     Psoriasis Family     Lupus Family     Ulcerative colitis Family     Breast cancer Cousin      Social History:  Social History     Substance and Sexual Activity   Alcohol Use Yes    Comment: 1 drink per month max     Social History     Substance and Sexual Activity   Drug Use No     Social History     Tobacco Use   Smoking Status Former Smoker   Smokeless Tobacco Never Used       Home Medications:   Prior to Admission medications    Medication Sig Start Date End Date Taking?  Authorizing Provider   acetaminophen (TYLENOL) 500 mg tablet Take 2 tablets by mouth daily as needed   Yes Historical Provider, MD   albuterol (Ventolin HFA) 90 mcg/act inhaler Inhale 2 puffs as needed for wheezing 7/15/20  Yes Ariel Ochoa MD   escitalopram (LEXAPRO) 10 mg tablet TAKE 1 TABLET BY MOUTH EVERY DAY 8/16/20  Yes Stanford Sharpe MD   levothyroxine 75 mcg tablet Take 1 tablet (75 mcg total) by mouth daily in the early morning Needs BRAND Synthroid 7/9/20  Yes Stanford Sharpe MD   Multiple Vitamins-Minerals (MULTIVITAMIN ADULT PO) Take 1 tablet by mouth daily   Yes Historical Provider, MD       Allergies: Allergies   Allergen Reactions    Morphine And Related Headache     Category: Adverse Reaction;     Statins     Nsaids Palpitations and Rash     Category: Allergy; Review of Systems:  Review of Systems   Constitutional: Negative  Negative for activity change, diaphoresis, fatigue and unexpected weight change  HENT: Negative  Negative for dental problem  Respiratory: Negative  Negative for chest tightness, shortness of breath and wheezing  Cardiovascular: Negative  Negative for chest pain, palpitations and leg swelling  Gastrointestinal: Negative  Negative for blood in stool, constipation, diarrhea, nausea and vomiting  Genitourinary: Negative  Musculoskeletal: Negative  Negative for arthralgias, back pain, gait problem and myalgias  Skin: Negative  Neurological: Negative  Negative for dizziness, syncope, weakness, light-headedness, numbness and headaches  Hematological: Negative  Does not bruise/bleed easily  All other systems reviewed and are negative  Vital Signs:     Vitals:    09/11/20 1439 09/11/20 1443   BP: 120/62 118/64   BP Location: Left arm Right arm   Patient Position: Sitting Sitting   Cuff Size: Standard Standard   Pulse: 75    Resp: 19    Temp: 98 °F (36 7 °C)    Weight: 57 2 kg (126 lb)    Height: 5' 7" (1 702 m)        Physical Exam:  Physical Exam  Constitutional:       General: She is not in acute distress  Appearance: Normal appearance  She is well-developed  She is not ill-appearing or toxic-appearing  Comments: Sitting on exam table in NAD   HENT:      Head: Normocephalic and atraumatic     Neck:      Musculoskeletal: Normal range of motion and neck supple  Vascular: No carotid bruit or JVD  Trachea: Trachea normal    Cardiovascular:      Rate and Rhythm: Normal rate and regular rhythm  Chest Wall: PMI is not displaced  Heart sounds: S1 normal and S2 normal  No murmur  No friction rub  No gallop  No S3 or S4 sounds  Comments: No heaves/lifts on palpation  Pulmonary:      Effort: Pulmonary effort is normal  No accessory muscle usage or respiratory distress  Breath sounds: Normal breath sounds  No wheezing, rhonchi or rales  Abdominal:      General: Bowel sounds are normal  There is no distension  Palpations: Abdomen is not rigid  There is no mass  Tenderness: There is no abdominal tenderness  There is no guarding or rebound  Skin:     General: Skin is warm and dry  Comments: Trace b/l LE   Neurological:      Mental Status: She is alert and oriented to person, place, and time  Cranial Nerves: No cranial nerve deficit  Sensory: No sensory deficit  Comments: No focal deficits         Lab Results:               Invalid input(s): LABGLOM      No results found for: HGBA1C  No results found for: CKTOTAL, CKMB, CKMBINDEX, TROPONINI    Imaging Studies:     CT Chest w/o 9/3/2020: ascending aorta 37mm, DTA 40mm    I have personally reviewed pertinent reports  and I have personally reviewed pertinent films in PACS    Assessment:  Patient Active Problem List    Diagnosis Date Noted    Anemia 08/17/2018    Thoracic aortic aneurysm without rupture ([facility] [address] [facility] 08/17/2018    Iron deficiency anemia 05/06/2016    Celiac disease 05/20/2013    Hypothyroidism 05/02/2012    Hyperlipidemia 05/02/2012     Ascending aortic aneurysm; Ongoing ascending aortic surveillance    Plan:     CT chest, without contrast performed prior to the visit today was reviewed  Descending aorta was measured at 40 mm at it's greatest diameter    These findings were confirmed and shared with the patient today  As they has been no interval enlargement since 8/2018,  follow-up monitoring is the treatment plan  Arrangements have been made for future surveillance to be completed with CT chest, without contrast in 2 Years  Emma Dow was comfortable with our recommendations, and their questions were answered to their satisfaction  Thank you for allowing us to participate in the care of this patient  Aortic Aneurysm Instructions were provided to the patient as follows:    1  No lifting more than 50 pounds  2  Maintain a controlled blood pressure with a goal of 120/80  3  Follow up in Aortic Clinic as recommended with radiology follow up as instructed  4  Report to the ER or call 911 immediately with the following signs / symptoms: sudden onset of back pain, chest pain or shortness of breath  5  Tobacco cessation discussed  The patient recently had a screening colonoscopy in 2019  Therefore GI referral is not indicated at this time  Richy Carias PA-C  DATE: September 11, 2020  TIME: 3:06 PM  Attestation signed by Brennen Ohara MD at 9/11/2020  4:03 PM:  Pt seen and examined with PA  I agree with the above assessment and plan  This is a two year f/u for aortic surveillance  She has no complaints  Her surveillance imaging was reviewed and shows stable enlargement of the descending thoracic aorta measuring 4 0 cm  I've therefore recommended 2 year surveillance imaging and f/u again        Debbie Pedro MD  DATE: September 11, 2020  TIME: 4:02 PM

## 2020-09-11 NOTE — PROGRESS NOTES
Consultation - Cardiothoracic Surgery   Isabelle Oneil 63 y o  female MRN: [id number]    Reason for Consult / Principal Problem: Aortic aneurysm    History of Present Illness: Isabelle Oneil is a 63y o  year old female who presents to the aortic clinic for a 2 year follow-up with scan  Her last visit was on 2018 which the aneurysm was found to be 40  Upon radiologic examination today, the aneurysm is found to be 40 & stable in descending portion  She admits to abiding by her lifting & exertion restrictions since her last visit  She is active & reports walking daily  She denies chest pain, palpitations, SOB, ALLISON, LE edema b/l, back pain, arm pain, numbness/tingling/paresthesias in UE b/l, HA, lightheadedness, dizziness, presyncopal symptoms, or syncopal events today or since her last visit  Changes to her medical history since her last visit consist of colonoscopy w/ bx showing ileocolitis from Celiac disease causing anemia (has not worked much on diet per patient, no BRBPR or hematochezia or melena, no need for more transfusions, last Hb at [facility] 2020 11 1), seeing endocrinology for hypothyroidism, has had some follow-up issues w/ PCP & colorectal per notation, ED for viral illness w/ incidental UTI  She admits to her maternal uncle being dx w/ aneurysm she believes in chest which needed an operation however he  prior to any intervention of an unknown cause  Her PCP Dr Marcell Eisenmenger manages her bp & she is not on any medications  Denies tobacco use, ETOH use, or drug use of any kind      Past Medical History:  Past Medical History:   Diagnosis Date    Anemia 2018    Celiac disease     Chiari I malformation [facility] [address] [facility]     Hyperlipemia     Nonmelanoma skin cancer     last assessed 2015    Squamous cell cancer of skin of ala nasi     2012    Thoracic aortic aneurysm without rupture ([facility] [address] ) 2018    Thyroid disorder      Past Surgical History:   Past Surgical History:   Procedure Laterality Date    CHOLECYSTECTOMY      COLECTOMY ZEB      COLONOSCOPY      resolved 2010    CORONARY ANGIOPLASTY WITH STENT PLACEMENT      celiac artery stent    HYSTERECTOMY  2001    LAPAROSCOPY      large intestine excision     OOPHORECTOMY Bilateral 2001    TRANSLUMINAL ANGIOPLASTY      intraoperative, renal artery      Family History:  Family History   Problem Relation Age of Onset    Arthritis Mother     COPD Mother     Rheum arthritis Mother     COPD Father     Rheumatic fever Father     Rheum arthritis Father     Hypertension Father     Lung cancer Father     Crohn's disease Family     Psoriasis Family     Lupus Family     Ulcerative colitis Family     Breast cancer Cousin      Social History:  Social History     Substance and Sexual Activity   Alcohol Use Yes    Comment: 1 drink per month max     Social History     Substance and Sexual Activity   Drug Use No     Social History     Tobacco Use   Smoking Status Former Smoker   Smokeless Tobacco Never Used       Home Medications:   Prior to Admission medications    Medication Sig Start Date End Date Taking? Authorizing Provider   acetaminophen (TYLENOL) 500 mg tablet Take 2 tablets by mouth daily as needed   Yes Historical Provider, MD   albuterol (Ventolin HFA) 90 mcg/act inhaler Inhale 2 puffs as needed for wheezing 7/15/20  Yes Laura Jewell MD   escitalopram (LEXAPRO) 10 mg tablet TAKE 1 TABLET BY MOUTH EVERY DAY 8/16/20  Yes Laura Jewell MD   levothyroxine 75 mcg tablet Take 1 tablet (75 mcg total) by mouth daily in the early morning Needs BRAND Synthroid 7/9/20  Yes Laura Jewell MD   Multiple Vitamins-Minerals (MULTIVITAMIN ADULT PO) Take 1 tablet by mouth daily   Yes Historical Provider, MD       Allergies: Allergies   Allergen Reactions    Morphine And Related Headache     Category: Adverse Reaction;     Statins     Nsaids Palpitations and Rash     Category: Allergy;         Review of Systems:  Review of Systems Constitutional: Negative  Negative for activity change, diaphoresis, fatigue and unexpected weight change  HENT: Negative  Negative for dental problem  Respiratory: Negative  Negative for chest tightness, shortness of breath and wheezing  Cardiovascular: Negative  Negative for chest pain, palpitations and leg swelling  Gastrointestinal: Negative  Negative for blood in stool, constipation, diarrhea, nausea and vomiting  Genitourinary: Negative  Musculoskeletal: Negative  Negative for arthralgias, back pain, gait problem and myalgias  Skin: Negative  Neurological: Negative  Negative for dizziness, syncope, weakness, light-headedness, numbness and headaches  Hematological: Negative  Does not bruise/bleed easily  All other systems reviewed and are negative  Vital Signs:     Vitals:    09/11/20 1439 09/11/20 1443   BP: 120/62 118/64   BP Location: Left arm Right arm   Patient Position: Sitting Sitting   Cuff Size: Standard Standard   Pulse: 75    Resp: 19    Temp: 98 °F (36 7 °C)    Weight: 57 2 kg (126 lb)    Height: 5' 7" (1 702 m)        Physical Exam:  Physical Exam  Constitutional:       General: She is not in acute distress  Appearance: Normal appearance  She is well-developed  She is not ill-appearing or toxic-appearing  Comments: Sitting on exam table in NAD   HENT:      Head: Normocephalic and atraumatic  Neck:      Musculoskeletal: Normal range of motion and neck supple  Vascular: No carotid bruit or JVD  Trachea: Trachea normal    Cardiovascular:      Rate and Rhythm: Normal rate and regular rhythm  Chest Wall: PMI is not displaced  Heart sounds: S1 normal and S2 normal  No murmur  No friction rub  No gallop  No S3 or S4 sounds  Comments: No heaves/lifts on palpation  Pulmonary:      Effort: Pulmonary effort is normal  No accessory muscle usage or respiratory distress  Breath sounds: Normal breath sounds   No wheezing, rhonchi or rales    Abdominal:      General: Bowel sounds are normal  There is no distension  Palpations: Abdomen is not rigid  There is no mass  Tenderness: There is no abdominal tenderness  There is no guarding or rebound  Skin:     General: Skin is warm and dry  Comments: Trace b/l LE   Neurological:      Mental Status: She is alert and oriented to person, place, and time  Cranial Nerves: No cranial nerve deficit  Sensory: No sensory deficit  Comments: No focal deficits         Lab Results:               Invalid input(s): LABGLOM      No results found for: HGBA1C  No results found for: CKTOTAL, CKMB, CKMBINDEX, TROPONINI    Imaging Studies:     CT Chest w/o 9/3/2020: ascending aorta 37mm, DTA 40mm    I have personally reviewed pertinent reports  and I have personally reviewed pertinent films in PACS    Assessment:  Patient Active Problem List    Diagnosis Date Noted    Anemia 08/17/2018    Thoracic aortic aneurysm without rupture ([facility] [address] ) 08/17/2018    Iron deficiency anemia 05/06/2016    Celiac disease 05/20/2013    Hypothyroidism 05/02/2012    Hyperlipidemia 05/02/2012     Ascending aortic aneurysm; Ongoing ascending aortic surveillance    Plan:     CT chest, without contrast performed prior to the visit today was reviewed  Descending aorta was measured at 40 mm at it's greatest diameter  These findings were confirmed and shared with the patient today  As they has been no interval enlargement since 8/2018,  follow-up monitoring is the treatment plan  Arrangements have been made for future surveillance to be completed with CT chest, without contrast in 2 Years  Ariel Lares was comfortable with our recommendations, and their questions were answered to their satisfaction  Thank you for allowing us to participate in the care of this patient  Aortic Aneurysm Instructions were provided to the patient as follows:    1  No lifting more than 50 pounds  2   Maintain a controlled blood pressure with a goal of 120/80  3  Follow up in Aortic Clinic as recommended with radiology follow up as instructed  4  Report to the ER or call 911 immediately with the following signs / symptoms: sudden onset of back pain, chest pain or shortness of breath  5  Tobacco cessation discussed  The patient recently had a screening colonoscopy in 2019  Therefore GI referral is not indicated at this time       SIGNATURE: Beth Still PA-C  DATE: September 11, 2020  TIME: 3:06 PM

## 2020-09-15 DIAGNOSIS — E03.9 HYPOTHYROIDISM, UNSPECIFIED TYPE: ICD-10-CM

## 2020-09-15 LAB
25(OH)D3 SERPL-MCNC: 21 NG/ML (ref 30–100)
ALBUMIN SERPL ELPH-MCNC: 4.3 G/DL (ref 3.8–4.8)
ALPHA1 GLOB SERPL ELPH-MCNC: 0.3 G/DL (ref 0.2–0.3)
ALPHA2 GLOB SERPL ELPH-MCNC: 0.7 G/DL (ref 0.5–0.9)
BETA1 GLOB SERPL ELPH-MCNC: 0.5 G/DL (ref 0.4–0.6)
BETA2 GLOB SERPL ELPH-MCNC: 0.2 G/DL (ref 0.2–0.5)
BUN SERPL-MCNC: 17 MG/DL (ref 7–25)
BUN/CREAT SERPL: NORMAL (CALC) (ref 6–22)
CALCIUM SERPL-MCNC: 9.1 MG/DL (ref 8.6–10.4)
CALCIUM SERPL-MCNC: 9.1 MG/DL (ref 8.6–10.4)
CHLORIDE SERPL-SCNC: 106 MMOL/L (ref 98–110)
CO2 SERPL-SCNC: 32 MMOL/L (ref 20–32)
CREAT SERPL-MCNC: 0.6 MG/DL (ref 0.5–0.99)
GAMMA GLOB SERPL ELPH-MCNC: 0.9 G/DL (ref 0.8–1.7)
GLUCOSE SERPL-MCNC: 91 MG/DL (ref 65–99)
POTASSIUM SERPL-SCNC: 4.5 MMOL/L (ref 3.5–5.3)
PROT PATTERN SERPL ELPH-IMP: NORMAL
PROT SERPL-MCNC: 6.8 G/DL (ref 6.1–8.1)
PTH-INTACT SERPL-MCNC: 29 PG/ML (ref 14–64)
SL AMB EGFR AFRICAN AMERICAN: 114 ML/MIN/1.73M2
SL AMB EGFR NON AFRICAN AMERICAN: 98 ML/MIN/1.73M2
SODIUM SERPL-SCNC: 141 MMOL/L (ref 135–146)
T4 FREE SERPL-MCNC: 1.1 NG/DL (ref 0.8–1.8)
TSH SERPL-ACNC: 13.44 MIU/L (ref 0.4–4.5)

## 2020-09-15 RX ORDER — LEVOTHYROXINE SODIUM 0.07 MG/1
75 TABLET ORAL
Qty: 34 TABLET | Refills: 3 | Status: SHIPPED | OUTPATIENT
Start: 2020-09-15 | End: 2020-12-23

## 2020-09-15 NOTE — TELEPHONE ENCOUNTER
----- Message from Brando Bryant MD sent at 9/15/2020  2:20 PM EDT -----  Please call the patient regarding her abnormal result  Her vitamin-D is low, she should take vitamin D3 supplementation 4000 International Units daily, also based on her thyroid function test, will increase her dose of levothyroxine to 75 micrograms daily from Monday through Saturday, on Sunday she should take 2 tablets    Please update the med list   She will need repeat TSH, free T4 in 6 weeks before her appointment in November

## 2020-10-27 ENCOUNTER — TRANSCRIBE ORDERS (OUTPATIENT)
Dept: ADMINISTRATIVE | Facility: HOSPITAL | Age: 60
End: 2020-10-27

## 2020-10-27 DIAGNOSIS — Z12.31 SCREENING MAMMOGRAM FOR HIGH-RISK PATIENT: Primary | ICD-10-CM

## 2020-11-01 LAB
T4 FREE SERPL-MCNC: 1.2 NG/DL (ref 0.8–1.8)
TSH SERPL-ACNC: 3.18 MIU/L (ref 0.4–4.5)

## 2020-11-19 ENCOUNTER — OFFICE VISIT (OUTPATIENT)
Dept: ENDOCRINOLOGY | Facility: CLINIC | Age: 60
End: 2020-11-19
Payer: COMMERCIAL

## 2020-11-19 VITALS
SYSTOLIC BLOOD PRESSURE: 120 MMHG | TEMPERATURE: 98 F | HEART RATE: 72 BPM | WEIGHT: 128 LBS | BODY MASS INDEX: 20.09 KG/M2 | HEIGHT: 67 IN | DIASTOLIC BLOOD PRESSURE: 80 MMHG

## 2020-11-19 DIAGNOSIS — M81.0 AGE-RELATED OSTEOPOROSIS WITHOUT CURRENT PATHOLOGICAL FRACTURE: ICD-10-CM

## 2020-11-19 DIAGNOSIS — E55.9 VITAMIN D DEFICIENCY: ICD-10-CM

## 2020-11-19 DIAGNOSIS — E03.9 ACQUIRED HYPOTHYROIDISM: Primary | ICD-10-CM

## 2020-11-19 PROCEDURE — 99214 OFFICE O/P EST MOD 30 MIN: CPT | Performed by: PHYSICIAN ASSISTANT

## 2020-11-19 PROCEDURE — 3008F BODY MASS INDEX DOCD: CPT | Performed by: PHYSICIAN ASSISTANT

## 2020-11-19 PROCEDURE — 1036F TOBACCO NON-USER: CPT | Performed by: PHYSICIAN ASSISTANT

## 2020-11-19 RX ORDER — CHOLECALCIFEROL (VITAMIN D3) 125 MCG
CAPSULE ORAL
COMMUNITY

## 2020-12-21 ENCOUNTER — TELEPHONE (OUTPATIENT)
Dept: ENDOCRINOLOGY | Facility: CLINIC | Age: 60
End: 2020-12-21

## 2020-12-21 DIAGNOSIS — E03.9 HYPOTHYROIDISM, UNSPECIFIED TYPE: ICD-10-CM

## 2020-12-21 RX ORDER — LEVOTHYROXINE SODIUM 75 MCG
TABLET ORAL
Qty: 102 TABLET | Refills: 1 | Status: SHIPPED | OUTPATIENT
Start: 2020-12-21 | End: 2021-01-14 | Stop reason: ALTCHOICE

## 2020-12-22 RX ORDER — LEVOTHYROXINE SODIUM 0.07 MG/1
TABLET ORAL
Qty: 102 TABLET | Refills: 1 | OUTPATIENT
Start: 2020-12-22

## 2020-12-23 DIAGNOSIS — E03.9 HYPOTHYROIDISM, UNSPECIFIED TYPE: ICD-10-CM

## 2020-12-23 RX ORDER — LEVOTHYROXINE SODIUM 0.07 MG/1
TABLET ORAL
Qty: 102 TABLET | Refills: 1 | Status: SHIPPED | OUTPATIENT
Start: 2020-12-23 | End: 2021-05-26 | Stop reason: SDUPTHER

## 2021-01-14 ENCOUNTER — OFFICE VISIT (OUTPATIENT)
Dept: FAMILY MEDICINE CLINIC | Facility: MEDICAL CENTER | Age: 61
End: 2021-01-14
Payer: COMMERCIAL

## 2021-01-14 VITALS
HEART RATE: 80 BPM | DIASTOLIC BLOOD PRESSURE: 70 MMHG | SYSTOLIC BLOOD PRESSURE: 130 MMHG | BODY MASS INDEX: 20.11 KG/M2 | RESPIRATION RATE: 16 BRPM | WEIGHT: 128.4 LBS | TEMPERATURE: 98.4 F

## 2021-01-14 DIAGNOSIS — D64.9 ANEMIA, UNSPECIFIED TYPE: ICD-10-CM

## 2021-01-14 DIAGNOSIS — R21 RASH: ICD-10-CM

## 2021-01-14 DIAGNOSIS — F41.8 DEPRESSION WITH ANXIETY: ICD-10-CM

## 2021-01-14 DIAGNOSIS — E03.9 ACQUIRED HYPOTHYROIDISM: Primary | ICD-10-CM

## 2021-01-14 DIAGNOSIS — I71.2 THORACIC AORTIC ANEURYSM WITHOUT RUPTURE (HCC): ICD-10-CM

## 2021-01-14 DIAGNOSIS — E78.01 FAMILIAL HYPERCHOLESTEROLEMIA: ICD-10-CM

## 2021-01-14 PROCEDURE — 3725F SCREEN DEPRESSION PERFORMED: CPT | Performed by: FAMILY MEDICINE

## 2021-01-14 PROCEDURE — 99214 OFFICE O/P EST MOD 30 MIN: CPT | Performed by: FAMILY MEDICINE

## 2021-01-15 NOTE — PROGRESS NOTES
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  Mood is negative  Sleeping poorly  Appetite is ok  Concentration ok  Anxious and worrying  Not suidical thoughts  Fatigued     See PHQ 9  Taking Lexapro  10 mg  She was referred to endocrinology for management of her hypothyroidism due to fluctuating TSH  See consult  She saw thoracic surgery for follow-up of her thoracic aortic aneurysm  Stable  She complains of a itchy burning rash in her lower back periodically  It seems to occur every other month  She questions if it is stress related  Questions if it is shingles  O: /70 (Cuff Size: Standard)   Pulse 80   Temp 98 4 °F (36 9 °C)   Resp 16   Wt 58 2 kg (128 lb 6 4 oz)   BMI 20 11 kg/m²    Neck no adenopathy thyromegaly bruits  Chest is clear with good breath sounds  Cardiac regular rate without murmur  Skin-healing slightly excoriated papules in the lower lumbosacral area only noted on the left    Assessment  1  Depression with anxiety-doing reasonably well 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 and [facility]  Will increase her Lexapro to 20 mg daily  2  Hypothyroidism-management per endocrinology recommendations  3  Hyperlipidemia-due for blood work; previous ASCVD risk was 4 4%  4  Recurrent rash-suspect this is herpetic; most likely simplex     Advised her to revisit during active outbreak to culture  Consider use of Valtrex  5   Health maintenance-due for mammogram soon  Discussed immunizations and advised Shingrix although should wait until COVID vaccination is completed  Plan  Check blood work  Increase Lexapro to 20 mg daily  Recheck 6 months if good response to this

## 2021-02-26 DIAGNOSIS — F41.8 DEPRESSION WITH ANXIETY: ICD-10-CM

## 2021-02-27 RX ORDER — ESCITALOPRAM OXALATE 10 MG/1
TABLET ORAL
Qty: 90 TABLET | Refills: 1 | Status: SHIPPED | OUTPATIENT
Start: 2021-02-27 | End: 2021-08-23

## 2021-04-26 ENCOUNTER — HOSPITAL ENCOUNTER (OUTPATIENT)
Dept: RADIOLOGY | Facility: MEDICAL CENTER | Age: 61
Discharge: HOME | End: 2021-04-26
Payer: COMMERCIAL

## 2021-04-26 ENCOUNTER — TELEPHONE (OUTPATIENT)
Dept: FAMILY MEDICINE CLINIC | Facility: CLINIC | Age: 61
End: 2021-04-26

## 2021-04-26 ENCOUNTER — OFFICE VISIT (OUTPATIENT)
Dept: FAMILY MEDICINE CLINIC | Facility: MEDICAL CENTER | Age: 61
End: 2021-04-26
Payer: COMMERCIAL

## 2021-04-26 VITALS
RESPIRATION RATE: 16 BRPM | HEIGHT: 67 IN | SYSTOLIC BLOOD PRESSURE: 130 MMHG | BODY MASS INDEX: 20.25 KG/M2 | WEIGHT: 129 LBS | HEART RATE: 80 BPM | TEMPERATURE: 98.6 F | OXYGEN SATURATION: 97 % | DIASTOLIC BLOOD PRESSURE: 64 MMHG

## 2021-04-26 DIAGNOSIS — M54.50 PAIN IN LEFT LUMBAR REGION OF BACK: ICD-10-CM

## 2021-04-26 DIAGNOSIS — R39.9 URINARY SYMPTOM OR SIGN: ICD-10-CM

## 2021-04-26 DIAGNOSIS — I71.2 THORACIC AORTIC ANEURYSM WITHOUT RUPTURE (HCC): Primary | ICD-10-CM

## 2021-04-26 DIAGNOSIS — Z95.828 S/P INSERTION OF ILIAC ARTERY STENT: ICD-10-CM

## 2021-04-26 DIAGNOSIS — I71.2 THORACIC AORTIC ANEURYSM WITHOUT RUPTURE (HCC): ICD-10-CM

## 2021-04-26 DIAGNOSIS — E03.8 OTHER SPECIFIED HYPOTHYROIDISM: ICD-10-CM

## 2021-04-26 DIAGNOSIS — R10.32 LLQ ABDOMINAL PAIN: ICD-10-CM

## 2021-04-26 DIAGNOSIS — R10.32 LEFT LOWER QUADRANT ABDOMINAL PAIN: ICD-10-CM

## 2021-04-26 LAB
SL AMB  POCT GLUCOSE, UA: ABNORMAL
SL AMB LEUKOCYTE ESTERASE,UA: ABNORMAL
SL AMB POCT BILIRUBIN,UA: ABNORMAL
SL AMB POCT BLOOD,UA: ABNORMAL
SL AMB POCT CLARITY,UA: CLEAR
SL AMB POCT COLOR,UA: CLEAR
SL AMB POCT KETONES,UA: ABNORMAL
SL AMB POCT NITRITE,UA: ABNORMAL
SL AMB POCT PH,UA: 6
SL AMB POCT SPECIFIC GRAVITY,UA: 1.02
SL AMB POCT URINE PROTEIN: ABNORMAL
SL AMB POCT UROBILINOGEN: ABNORMAL

## 2021-04-26 PROCEDURE — 87086 URINE CULTURE/COLONY COUNT: CPT | Performed by: NURSE PRACTITIONER

## 2021-04-26 PROCEDURE — G1004 CDSM NDSC: HCPCS

## 2021-04-26 PROCEDURE — 99214 OFFICE O/P EST MOD 30 MIN: CPT | Performed by: NURSE PRACTITIONER

## 2021-04-26 PROCEDURE — 81002 URINALYSIS NONAUTO W/O SCOPE: CPT | Performed by: NURSE PRACTITIONER

## 2021-04-26 PROCEDURE — 74176 CT ABD & PELVIS W/O CONTRAST: CPT

## 2021-04-26 PROCEDURE — 71250 CT THORAX DX C-: CPT

## 2021-04-26 NOTE — PROGRESS NOTES
BMI Counseling: Body mass index is 20 2 kg/m²  The BMI is above normal  Nutrition recommendations include decreasing portion sizes, encouraging healthy choices of fruits and vegetables, decreasing fast food intake, limiting drinks that contain sugar, moderation in carbohydrate intake, increasing intake of lean protein, reducing intake of saturated and trans fat and reducing intake of cholesterol  Exercise recommendations include vigorous physical activity 75 minutes/week, exercising 3-5 times per week, obtaining a gym membership and strength training exercises  No pharmacotherapy was ordered  patient  Assessment/Plan:         Problem List Items Addressed This Visit        Endocrine    Hypothyroidism       Patient id of hypothyroidism currently she is maintained on levothyroxine 75 mcg p o  daily  Repeat TSH level in 3 months  Most recent TSH 3 18  Cardiovascular and Mediastinum    Thoracic aortic aneurysm without rupture ([facility] [address] ) - Primary       Patient reports posterior back pain that radiates bilaterally  Patient has a history of aortic aneurysm which was evaluated 1 year prior  Currently ordered for thoracic CT of the chest for evaluation of possible thoracic dissection  Relevant Orders    CBC and differential    Comprehensive metabolic panel       Other    S/P insertion of iliac artery stent       Patient has history of right iliac stent  Placement in the past   She has been instructed to follow-up with her vascular doctors for further  routine evaluation  Currently thoracic aneurysm has been stable  Urinary symptom or sign      Patient  Reports that she has abdominal pain  Currently no frequency, urgency or burning noted of urination  Recent urine dip   Only indicated trace blood  Relevant Orders    POCT urine dip (Completed)    Urine culture    Left lower quadrant abdominal pain       Left lower quadrant of abdomen with pain and discomfort    CT of the abdomen for evaluation ordered at this time  Patient to avoid gluten as well due to history of celiac disease  Celiac diet reviewed with patient  Pain in left lumbar region of back       Patient has pain in her left lower lumbar region root radiates to her abdomen  CT of the chest abdomen pelvis ordered at this time for further evaluation  Relevant Orders    CBC and differential    Comprehensive metabolic panel            Subjective:      Patient ID: Duane Samuels is a 57 y o  female  Patient is a 54-year-old female that reports that she has had left lower quadrant abdominal pain that radiates across her lower pelvis and left lumbar back pain that radiates to the right side for 2 days  Patient has a history of thoracic aneurysm 40 mm in size according to CT scan from last year, she also reports in 2006 right iliac stent  Currently we will send her for CT of the chest for evaluation of thoracic aneurysm, CT of the abdomen with out contrast and routine lab work  Urine dip evaluated and benign  Patient reports temperature yesterday of 99 4  Current blood pressure 130/64, pulse 80, respirations 16, temperature currently 98 4°  O2 saturation 97%  Recent testing for coronavirus negative  The following portions of the patient's history were reviewed and updated as appropriate:   Past Medical History:  She has a past medical history of Anemia (8/17/2018), Celiac disease, Chiari I malformation ([facility] [address] ), Hyperlipemia, Nonmelanoma skin cancer, Squamous cell cancer of skin of ala nasi, Thoracic aortic aneurysm without rupture ([facility] [address] ) (8/17/2018), and Thyroid disorder  ,  _______________________________________________________________________  Medical Problems:  does not have any pertinent problems on file ,  _______________________________________________________________________  Past Surgical History:   has a past surgical history that includes Coronary angioplasty with stent;  Cholecystectomy; Colonoscopy; Transluminal angioplasty; LAPAROSCOPY; COLECTOMY ZEB; Hysterectomy (2001); and Oophorectomy (Bilateral, 2001)  ,  _______________________________________________________________________  Family History:  family history includes Arthritis in her mother; Breast cancer in her cousin; COPD in her father and mother; Crohn's disease in her family; Hypertension in her father; Lung cancer in her father; Lupus in her family; Psoriasis in her family; Rheum arthritis in her father and mother; Rheumatic fever in her father; Ulcerative colitis in her family  ,  _______________________________________________________________________  Social History:   reports that she has quit smoking  She has never used smokeless tobacco  She reports current alcohol use  She reports that she does not use drugs  ,  _______________________________________________________________________  Allergies:  is allergic to morphine and related; statins; and nsaids     _______________________________________________________________________  Current Outpatient Medications   Medication Sig Dispense Refill    acetaminophen (TYLENOL) 500 mg tablet Take 2 tablets by mouth daily as needed      albuterol (Ventolin HFA) 90 mcg/act inhaler Inhale 2 puffs as needed for wheezing 1 Inhaler 2    Cholecalciferol (Vitamin D) 125 MCG (5000 UT) CAPS Take by mouth      escitalopram (LEXAPRO) 10 mg tablet TAKE 1 TABLET BY MOUTH EVERY DAY 90 tablet 1    levothyroxine 75 mcg tablet TAKE 1 TABLET BY MOUTH DAILY IN THE EARLY MORNING MON-SAT  TAKE 2 TABS ON SUNDAY  102 tablet 1    Multiple Vitamins-Minerals (MULTIVITAMIN ADULT PO) Take 1 tablet by mouth daily       No current facility-administered medications for this visit       _______________________________________________________________________  Review of Systems   Constitutional: Negative  Negative for activity change, appetite change, chills, fatigue, fever and unexpected weight change     HENT: Negative for congestion, ear discharge, ear pain, nosebleeds, postnasal drip, rhinorrhea, sinus pressure, sinus pain, sneezing, sore throat and voice change  Eyes: Negative for pain, redness and visual disturbance  Respiratory: Negative for apnea, cough, choking, chest tightness, shortness of breath and wheezing  Cardiovascular: Negative for chest pain and palpitations  Gastrointestinal: Positive for abdominal pain  Negative for abdominal distention, anal bleeding, blood in stool, constipation, diarrhea, nausea, rectal pain and vomiting  LLQ of abdomen 6/10 pain  Feels bloated  Endocrine: Negative  Genitourinary: Negative for decreased urine volume, difficulty urinating, dysuria, flank pain, frequency, hematuria, pelvic pain, urgency, vaginal bleeding, vaginal discharge and vaginal pain  Musculoskeletal: Positive for back pain and myalgias  Negative for arthralgias  Skin: Negative  Neurological: Positive for headaches  Negative for dizziness, tremors, seizures, syncope, facial asymmetry, speech difficulty, weakness, light-headedness and numbness  Hematological: Negative  Psychiatric/Behavioral: Negative  Objective:  Vitals:    04/26/21 1330   BP: 130/64   BP Location: Left arm   Patient Position: Sitting   Cuff Size: Adult   Pulse: 80   Resp: 16   Temp: 98 6 °F (37 °C)   SpO2: 97%   Weight: 58 5 kg (129 lb)   Height: 5' 7" (1 702 m)     Body mass index is 20 2 kg/m²  Physical Exam  Vitals signs and nursing note reviewed  Constitutional:       Appearance: Normal appearance  She is well-developed and normal weight  HENT:      Head: Normocephalic and atraumatic  Right Ear: Tympanic membrane, ear canal and external ear normal       Left Ear: Tympanic membrane, ear canal and external ear normal       Nose: Nose normal       Mouth/Throat:      Mouth: Mucous membranes are moist    Eyes:      Extraocular Movements: Extraocular movements intact        Conjunctiva/sclera: Conjunctivae normal       Pupils: Pupils are equal, round, and reactive to light  Neck:      Musculoskeletal: Normal range of motion  Cardiovascular:      Rate and Rhythm: Normal rate and regular rhythm  Pulses: Normal pulses  Heart sounds: Normal heart sounds  No murmur  Pulmonary:      Effort: Pulmonary effort is normal       Breath sounds: Normal breath sounds  Abdominal:      General: Bowel sounds are normal       Palpations: Abdomen is soft  Musculoskeletal: Normal range of motion  Skin:     General: Skin is warm  Capillary Refill: Capillary refill takes less than 2 seconds  Neurological:      General: No focal deficit present  Mental Status: She is alert and oriented to person, place, and time     Psychiatric:         Mood and Affect: Mood normal          Behavior: Behavior normal

## 2021-04-27 ENCOUNTER — APPOINTMENT (OUTPATIENT)
Dept: RADIOLOGY | Facility: MEDICAL CENTER | Age: 61
End: 2021-04-27
Payer: COMMERCIAL

## 2021-04-27 ENCOUNTER — HOSPITAL ENCOUNTER (OUTPATIENT)
Dept: RADIOLOGY | Facility: MEDICAL CENTER | Age: 61
Discharge: HOME | End: 2021-04-27
Payer: COMMERCIAL

## 2021-04-27 VITALS — HEIGHT: 67 IN | BODY MASS INDEX: 20.25 KG/M2 | WEIGHT: 129 LBS

## 2021-04-27 DIAGNOSIS — Z12.31 SCREENING MAMMOGRAM FOR HIGH-RISK PATIENT: ICD-10-CM

## 2021-04-27 LAB — BACTERIA UR CULT: NORMAL

## 2021-04-27 PROCEDURE — 77067 SCR MAMMO BI INCL CAD: CPT

## 2021-04-27 PROCEDURE — 77063 BREAST TOMOSYNTHESIS BI: CPT

## 2021-04-27 NOTE — ASSESSMENT & PLAN NOTE
Patient has pain in her left lower lumbar region root radiates to her abdomen  CT of the chest abdomen pelvis ordered at this time for further evaluation

## 2021-04-27 NOTE — ASSESSMENT & PLAN NOTE
Patient currently on vitamin-D supplementation repeat vitamin-D level to be ordered for re-evaluation

## 2021-04-27 NOTE — ASSESSMENT & PLAN NOTE
Patient has history of right iliac stent  Placement in the past   She has been instructed to follow-up with her vascular doctors for further  routine evaluation  Currently thoracic aneurysm has been stable

## 2021-04-27 NOTE — ASSESSMENT & PLAN NOTE
Left lower quadrant of abdomen with pain and discomfort  CT of the abdomen for evaluation ordered at this time  Patient to avoid gluten as well due to history of celiac disease  Celiac diet reviewed with patient

## 2021-04-27 NOTE — ASSESSMENT & PLAN NOTE
Patient  Reports that she has abdominal pain  Currently no frequency, urgency or burning noted of urination  Recent urine dip   Only indicated trace blood

## 2021-04-27 NOTE — ASSESSMENT & PLAN NOTE
Patient has noted history of celiac disease  This may be attributed to her abdominal pain which is the left lower quadrant of her abdomen  Follow-up CT of the abdomen ordered at this time

## 2021-04-27 NOTE — ASSESSMENT & PLAN NOTE
Patient reports posterior back pain that radiates bilaterally  Patient has a history of aortic aneurysm which was evaluated 1 year prior  Currently ordered for thoracic CT of the chest for evaluation of possible thoracic dissection

## 2021-04-27 NOTE — PATIENT INSTRUCTIONS
Gastroesophageal Reflux Disease   WHAT YOU NEED TO KNOW:   What is gastroesophageal reflux disease (GERD)? GERD is reflux that occurs more than twice a week for a few weeks  Reflux means acid and food in the stomach back up into the esophagus  It usually causes heartburn and other symptoms  GERD can cause other health problems over time if it is not treated  What causes GERD? GERD often occurs when the lower muscle (sphincter) of the esophagus does not close properly  The sphincter normally opens to let food into the stomach  It then closes to keep food and stomach acid in the stomach  If the sphincter does not close properly, stomach acid and food back up (reflux) into the esophagus  The following may increase your risk for GERD:  · Certain foods such as spicy foods, chocolate, foods that contain caffeine, peppermint, and fried foods    · Hiatal hernia    · Certain medicines such as calcium channel blockers (used to treat high blood pressure), allergy medicines, sedatives, or antidepressants    · Pregnancy or obesity    · Lying down after a meal    · Drinking alcohol or smoking    What are the signs and symptoms of GERD? · Heartburn (burning pain in your chest)    · Pain after meals that spreads to your neck, jaw, or shoulder    · Pain that gets better when you change positions    · Bitter or acid taste in your mouth    · A dry cough    · Trouble swallowing or pain with swallowing    · Hoarseness or a sore throat    · Burping or hiccups    · Feeling full soon after you start eating    How is GERD diagnosed? Your healthcare provider will ask about your symptoms and when they started  Tell him or her about other medical conditions you have, your eating habits, and your activities  You may also need any of the following:  · The amount of acid  in your esophagus and stomach may be checked  A small probe is used to check the amount      · An endoscopy  is a procedure used to look at the inside of your esophagus and stomach  An endoscope is a bendable tube with a light and camera on the end  Your healthcare provider may remove a small sample of tissue and send it to a lab for tests  · X-ray  pictures may be taken of your stomach and intestines (bowel)  You may be given a chalky liquid to drink before the pictures are taken  This liquid helps your stomach and intestines show up better on the x-rays  · Pressure and function  tests of your esophagus can help find problems such as a hiatal hernia  How is GERD treated? · Medicines  are used to decrease stomach acid  Medicine may also be used to help your lower esophageal sphincter and stomach contract (tighten) more  · Surgery  is done to wrap the upper part of the stomach around the esophageal sphincter  This will strengthen the sphincter and prevent reflux  How can I manage GERD? · Do not have foods or drinks that may increase heartburn  These include chocolate, peppermint, fried or fatty foods, drinks that contain caffeine, or carbonated drinks (soda)  Other foods include spicy foods, onions, tomatoes, and tomato-based foods  Do not have foods or drinks that can irritate your esophagus, such as citrus fruits, juices, and alcohol  · Do not eat large meals  When you eat a lot of food at one time, your stomach needs more acid to digest it  Eat 6 small meals each day instead of 3 large ones, and eat slowly  Do not eat meals 2 to 3 hours before bedtime  · Elevate the head of your bed  Place 6-inch blocks under the head of your bed frame  You may also use more than one pillow under your head and shoulders while you sleep  · Maintain a healthy weight  If you are overweight, weight loss may help relieve symptoms of GERD  · Do not smoke  Smoking weakens the lower esophageal sphincter and increases the risk of GERD  Ask your healthcare provider for information if you currently smoke and need help to quit   E-cigarettes or smokeless tobacco still contain nicotine  Talk to your healthcare provider before you use these products  · Do not wear clothing that is tight around your waist   Tight clothing can put pressure on your stomach and cause or worsen GERD symptoms  Call your local emergency number (911 in the [address]) if:   · You have severe chest pain and sudden trouble breathing  When should I seek immediate care? · You have trouble breathing after you vomit  · You have trouble swallowing, or pain with swallowing  · Your bowel movements are black, bloody, or tarry-looking  · Your vomit looks like coffee grounds or has blood in it  When should I call my doctor? · You feel full and cannot burp or vomit  · You vomit large amounts, or you vomit often  · You are losing weight without trying  · Your symptoms get worse or do not improve with treatment  · You have questions or concerns about your condition or care  CARE AGREEMENT:   You have the right to help plan your care  Learn about your health condition and how it may be treated  Discuss treatment options with your healthcare providers to decide what care you want to receive  You always have the right to refuse treatment  The above information is an  only  It is not intended as medical advice for individual conditions or treatments  Talk to your doctor, nurse or pharmacist before following any medical regimen to see if it is safe and effective for you  © Copyright [address] Information is for End User's use only and may not be sold, redistributed or otherwise used for commercial purposes  All illustrations and images included in CareNotes® are the copyrighted property of [employer]  or [facility] Alejandro Mena   Acute Abdominal Pain   WHAT YOU NEED TO KNOW:   The cause of your abdominal pain may not be found  If a cause is found, treatment will depend on what the cause is     DISCHARGE INSTRUCTIONS:   Return to the emergency department if:   · You vomit blood or cannot stop vomiting  · You have blood in your bowel movement or it looks like tar  · You have bleeding from your rectum  · Your abdomen is larger than usual, more painful, and hard  · You have severe pain in your abdomen  · You stop passing gas and having bowel movements  · You feel weak, dizzy, or faint  Contact your healthcare provider if:   · You have a fever  · You have new signs and symptoms  · Your symptoms do not get better with treatment  · You have questions or concerns about your condition or care  Medicines  may be given to decrease pain, treat an infection, and manage your symptoms  Take your medicine as directed  Call your healthcare provider if you think your medicine is not helping or if you have side effects  Tell him if you are allergic to any medicine  Keep a list of the medicines, vitamins, and herbs you take  Include the amounts, and when and why you take them  Bring the list or the pill bottles to follow-up visits  Carry your medicine list with you in case of an emergency  Manage your symptoms:   · Apply heat  on your abdomen for 20 to 30 minutes every 2 hours for as many days as directed  Heat helps decrease pain and muscle spasms  · Manage your stress  Stress may cause abdominal pain  Your healthcare provider may recommend relaxation techniques and deep breathing exercises to help decrease your stress  Your healthcare provider may recommend you talk to someone about your stress or anxiety, such as a counselor or a trusted friend  Get plenty of sleep and exercise regularly  · Limit or do not drink alcohol  Alcohol can make your abdominal pain worse  Ask your healthcare provider if it is safe for you to drink alcohol  Also ask how much is safe for you to drink  · Do not smoke  Nicotine and other chemicals in cigarettes can damage your esophagus and stomach   Ask your healthcare provider for information if you currently smoke and need help to quit  E-cigarettes or smokeless tobacco still contain nicotine  Talk to your healthcare provider before you use these products  Make changes to the food you eat as directed:  Do not eat foods that cause abdominal pain or other symptoms  Eat small meals more often  · Eat more high-fiber foods if you are constipated  High-fiber foods include fruits, vegetables, whole-grain foods, and legumes  · Do not eat foods that cause gas if you have bloating  Examples include broccoli, cabbage, and cauliflower  Do not drink soda or carbonated drinks, because these may also cause gas  · Do not eat foods or drinks that contain sorbitol or fructose if you have diarrhea and bloating  Some examples are fruit juices, candy, jelly, and sugar-free gum  · Do not eat high-fat foods, such as fried foods, cheeseburgers, hot dogs, and desserts  · Limit or do not drink caffeine  Caffeine may make symptoms, such as heart burn or nausea, worse  · Drink plenty of liquids to prevent dehydration from diarrhea or vomiting  Ask your healthcare provider how much liquid to drink each day and which liquids are best for you  Follow up with your healthcare provider as directed:  Write down your questions so you remember to ask them during your visits  © Copyright [address] Information is for End User's use only and may not be sold, redistributed or otherwise used for commercial purposes  All illustrations and images included in CareNotes® are the copyrighted property of A D A M , Inc  or [address] [place], [place] [address]  The above information is an  only  It is not intended as medical advice for individual conditions or treatments  Talk to your doctor, nurse or pharmacist before following any medical regimen to see if it is safe and effective for you  Thoracic Aortic Aneurysm   WHAT YOU NEED TO KNOW:   What is a thoracic aortic aneurysm?   A thoracic aortic aneurysm (TAA) is a bulge in your aorta that occurs when the aorta's walls are weakened  The aorta is a large blood vessel that extends from your heart down the center of your chest and into your abdomen  What causes a TAA? · Atherosclerosis: This is hardening of the arteries  Hardening occurs when fatty deposits, called plaque, build up along the walls of your arteries  · Abnormal development:  A part of your heart and aorta may not have developed normally  · Inflammation:  You may have inflammation in the walls of your aorta from an inflammatory disease or certain infections, such as syphilis  · Loss of elasticity:  As you age, blood vessels in the body may lose some of their elasticity  It may be worsened by long-term increased blood pressure  Certain disorders also can make the walls of your arteries lose elasticity  · Trauma:  Injury, particularly on the chest area, may lead to a partial or complete cut in the aorta  What increases my risk of a TAA? · Cigarette smoking    · High blood pressure    · A close family member has had a TAA    · Being male and older than 72years of age    · Diabetes     · Being overweight    What are the signs and symptoms of a TAA? You may have no signs or symptoms  The following are common when signs and symptoms do occur:  · Chest, neck, back, or abdominal pain    · Cough or blood in sputum    · Hoarseness    · Trouble breathing, which may be affected by position    · Trouble swallowing    · Wheezing    How is a TAA diagnosed? You may need more than one of the following tests  You may be given contrast dye before some tests to help the pictures show up better  Tell the healthcare provider if you have ever had an allergic reaction to contrast dye  · X-rays: These show your lungs, heart, aorta, and other parts around them  An x-ray of the aorta with dye is called an aortogram or aortography  · CT scan: This test is also called a CAT scan   An x-ray machine uses a computer to take pictures of your chest and blood vessels  The pictures may show a TAA  · MRI:  This scan uses powerful magnets and a computer to take pictures of your chest and blood vessels  An MRI may show a TAA  Do not enter the MRI room with anything metal  Metal can cause serious injury  Tell the healthcare provider if you have any metal in or on your body  · Transesophageal echocardiogram:      ? A transesophageal echocardiogram (MAGGIE) is a type of ultrasound that shows pictures of the size and shape of your heart  It also looks at how your heart moves when it is beating  These pictures are seen on a TV-like screen  You may need a MAGGIE if your heart does not show up very well in a regular echocardiogram  You may also need a MAGGIE to check for certain problems such as blood clots or infection inside the heart  ? You will be given medicine to relax you during a MAGGIE  Healthcare providers put a tube in your mouth that is moved down into your esophagus (food pipe)  The tube has a small ultrasound sensor on the end  Since your esophagus is right next to your heart, your healthcare provider can see your heart clearly  How is a TAA treated? · Medicines: You may be given blood pressure or cholesterol medicine to help stop your TAA from growing  · Repair:  Healthcare providers may cut out the aneurysm and replace the cut area with an artificial tube  If the aortic valve (flap) also has a problem, it may also be replaced  · Stenting:  A stent (tube) may be put in the portion of the aorta with aneurysm  The stent may strengthen your aorta and reduce pressure on the wall of your aorta  What are the risks of a TAA? Surgery may damage to your spinal cord, or cause you to bleed or get an infection  Too much blood loss can cause your organs to fail, such as your kidneys  You may need to have surgery again  If untreated, TAA may cause more serious problems  The aneurysm may get larger, burst, and be life-threatening     How can I manage my symptoms? · Check your blood pressure as directed:  Keep a record of your blood pressure and bring it with you to follow-up visits  Ask your healthcare provider what your blood pressure should be and how to check it  High blood pressure can make your aneurysm worse  · Exercise:  Ask your healthcare provider to help you create an exercise plan  This may help lower blood pressure  · Eat a variety of healthy foods:  Healthy foods include fruits, vegetables, whole-grain breads, low-fat dairy products, beans, lean meats, and fish  Ask if you need to be on a special diet  · Do not smoke: If you smoke, it is never too late to quit  Smoking increases your risk for TAA and heart disease  Ask your healthcare provider for information if you need help quitting  When should I contact my healthcare provider? · You have a fever  · You have new symptoms since your last appointment  · Your symptoms prevent you from doing your daily activities  · You have questions or concerns about your condition or care  When should I seek immediate care or call 911? · You have nausea and are vomiting  · You have sudden chest, neck, back, or abdominal pain  · Your skin becomes pale and sweaty, or you feel very weak  · You are dizzy, or you faint  CARE AGREEMENT:   You have the right to help plan your care  Learn about your health condition and how it may be treated  Discuss treatment options with your healthcare providers to decide what care you want to receive  You always have the right to refuse treatment  The above information is an  only  It is not intended as medical advice for individual conditions or treatments  Talk to your doctor, nurse or pharmacist before following any medical regimen to see if it is safe and effective for you    © Copyright [address] Information is for End User's use only and may not be sold, redistributed or otherwise used for commercial purposes   All illustrations and images included in CareNotes® are the copyrighted property of A D A M , Inc  or [employer]

## 2021-05-11 LAB
ALBUMIN SERPL-MCNC: 4.4 G/DL (ref 3.6–5.1)
ALBUMIN/GLOB SERPL: 1.8 (CALC) (ref 1–2.5)
ALP SERPL-CCNC: 56 U/L (ref 37–153)
ALT SERPL-CCNC: 17 U/L (ref 6–29)
AST SERPL-CCNC: 23 U/L (ref 10–35)
BASOPHILS # BLD AUTO: 21 CELLS/UL (ref 0–200)
BASOPHILS NFR BLD AUTO: 0.5 %
BILIRUB SERPL-MCNC: 0.4 MG/DL (ref 0.2–1.2)
BUN SERPL-MCNC: 19 MG/DL (ref 7–25)
BUN/CREAT SERPL: NORMAL (CALC) (ref 6–22)
CALCIUM SERPL-MCNC: 9.5 MG/DL (ref 8.6–10.4)
CHLORIDE SERPL-SCNC: 103 MMOL/L (ref 98–110)
CO2 SERPL-SCNC: 30 MMOL/L (ref 20–32)
CREAT SERPL-MCNC: 0.58 MG/DL (ref 0.5–0.99)
EOSINOPHIL # BLD AUTO: 80 CELLS/UL (ref 15–500)
EOSINOPHIL NFR BLD AUTO: 1.9 %
ERYTHROCYTE [DISTWIDTH] IN BLOOD BY AUTOMATED COUNT: 13.6 % (ref 11–15)
GLOBULIN SER CALC-MCNC: 2.5 G/DL (CALC) (ref 1.9–3.7)
GLUCOSE SERPL-MCNC: 86 MG/DL (ref 65–99)
HCT VFR BLD AUTO: 35.4 % (ref 35–45)
HGB BLD-MCNC: 11.6 G/DL (ref 11.7–15.5)
LYMPHOCYTES # BLD AUTO: 1302 CELLS/UL (ref 850–3900)
LYMPHOCYTES NFR BLD AUTO: 31 %
MCH RBC QN AUTO: 30.4 PG (ref 27–33)
MCHC RBC AUTO-ENTMCNC: 32.8 G/DL (ref 32–36)
MCV RBC AUTO: 92.7 FL (ref 80–100)
MONOCYTES # BLD AUTO: 349 CELLS/UL (ref 200–950)
MONOCYTES NFR BLD AUTO: 8.3 %
NEUTROPHILS # BLD AUTO: 2449 CELLS/UL (ref 1500–7800)
NEUTROPHILS NFR BLD AUTO: 58.3 %
PLATELET # BLD AUTO: 294 THOUSAND/UL (ref 140–400)
PMV BLD REES-ECKER: 10.7 FL (ref 7.5–12.5)
POTASSIUM SERPL-SCNC: 3.9 MMOL/L (ref 3.5–5.3)
PROT SERPL-MCNC: 6.9 G/DL (ref 6.1–8.1)
RBC # BLD AUTO: 3.82 MILLION/UL (ref 3.8–5.1)
SL AMB EGFR AFRICAN AMERICAN: 114 ML/MIN/1.73M2
SL AMB EGFR NON AFRICAN AMERICAN: 99 ML/MIN/1.73M2
SODIUM SERPL-SCNC: 141 MMOL/L (ref 135–146)
WBC # BLD AUTO: 4.2 THOUSAND/UL (ref 3.8–10.8)

## 2021-05-25 LAB
25(OH)D3 SERPL-MCNC: 48 NG/ML (ref 30–100)
BUN SERPL-MCNC: 20 MG/DL (ref 7–25)
BUN/CREAT SERPL: NORMAL (CALC) (ref 6–22)
CALCIUM SERPL-MCNC: 9.3 MG/DL (ref 8.6–10.4)
CHLORIDE SERPL-SCNC: 104 MMOL/L (ref 98–110)
CO2 SERPL-SCNC: 31 MMOL/L (ref 20–32)
CREAT SERPL-MCNC: 0.54 MG/DL (ref 0.5–0.99)
GLUCOSE SERPL-MCNC: 85 MG/DL (ref 65–99)
POTASSIUM SERPL-SCNC: 3.9 MMOL/L (ref 3.5–5.3)
SL AMB EGFR AFRICAN AMERICAN: 117 ML/MIN/1.73M2
SL AMB EGFR NON AFRICAN AMERICAN: 101 ML/MIN/1.73M2
SODIUM SERPL-SCNC: 141 MMOL/L (ref 135–146)
T4 FREE SERPL-MCNC: 1.3 NG/DL (ref 0.8–1.8)
TSH SERPL-ACNC: 10.75 MIU/L (ref 0.4–4.5)

## 2021-05-26 DIAGNOSIS — E03.8 OTHER SPECIFIED HYPOTHYROIDISM: Primary | ICD-10-CM

## 2021-05-26 DIAGNOSIS — E03.9 HYPOTHYROIDISM, UNSPECIFIED TYPE: ICD-10-CM

## 2021-05-26 RX ORDER — LEVOTHYROXINE SODIUM 0.1 MG/1
TABLET ORAL
Qty: 90 TABLET | Refills: 1 | Status: SHIPPED | OUTPATIENT
Start: 2021-05-26 | End: 2021-11-02

## 2021-05-26 NOTE — TELEPHONE ENCOUNTER
Please ask patient to increase dose to levothyroxine 100 mcg 1 tablet daily Mon-Sunday    Order repeat TSH and free T4 to do in 6 weeks

## 2021-05-26 NOTE — TELEPHONE ENCOUNTER
----- Message from Rui Reagan PA-C sent at 5/26/2021  8:51 AM EDT -----  Please call the patient regarding her abnormal result  TSH elevated at 10 75 and free T4 normal at 1 3  has she missed any doses of levothyroxine lately? Vitamin D normal at 48  Continue current supplementation

## 2021-05-27 ENCOUNTER — OFFICE VISIT (OUTPATIENT)
Dept: FAMILY MEDICINE CLINIC | Facility: MEDICAL CENTER | Age: 61
End: 2021-05-27
Payer: COMMERCIAL

## 2021-05-27 VITALS
BODY MASS INDEX: 19.9 KG/M2 | DIASTOLIC BLOOD PRESSURE: 68 MMHG | HEIGHT: 67 IN | OXYGEN SATURATION: 95 % | TEMPERATURE: 98.8 F | WEIGHT: 126.8 LBS | HEART RATE: 75 BPM | SYSTOLIC BLOOD PRESSURE: 127 MMHG

## 2021-05-27 DIAGNOSIS — R10.32 LEFT LOWER QUADRANT ABDOMINAL PAIN: ICD-10-CM

## 2021-05-27 DIAGNOSIS — E78.01 FAMILIAL HYPERCHOLESTEROLEMIA: Primary | ICD-10-CM

## 2021-05-27 PROCEDURE — 99213 OFFICE O/P EST LOW 20 MIN: CPT | Performed by: FAMILY MEDICINE

## 2021-05-27 PROCEDURE — 3008F BODY MASS INDEX DOCD: CPT | Performed by: FAMILY MEDICINE

## 2021-05-27 PROCEDURE — 1036F TOBACCO NON-USER: CPT | Performed by: FAMILY MEDICINE

## 2021-05-27 NOTE — PROGRESS NOTES
[facility] was seen for abdominal pain in April  See notes by NP  CT showed mesenteric adenitis? Pain resolved  She has been under a lot of stress working durinCOVId pandemic  Also understaffed  Got COVID vaccinel  [employer]  1/21 and 2/11  Saw aortic clinic in Sept  Thoracic aneurysm stable  Was referred to endo for her hypothyrodism     Otherwise doing well    O: /68 (BP Location: Left arm, Patient Position: Sitting, Cuff Size: Adult)   Pulse 75   Temp 98 8 °F (37 1 °C)   Ht 5' 7" (1 702 m)   Wt 57 5 kg (126 lb 12 8 oz)   SpO2 95%   BMI 19 86 kg/m²    Physical Exam  Constitutional:       General: She is not in acute distress  Appearance: She is well-developed  Neck:      Thyroid: No thyromegaly  Vascular: No carotid bruit  Cardiovascular:      Rate and Rhythm: Normal rate and regular rhythm  Heart sounds: Normal heart sounds  Pulmonary:      Effort: Pulmonary effort is normal       Breath sounds: Normal breath sounds  Abdominal:      General: Bowel sounds are normal       Palpations: Abdomen is soft  There is no hepatomegaly or mass  Tenderness: There is no abdominal tenderness  Lymphadenopathy:      Cervical: No cervical adenopathy  Assessment  1  Abdominal pain-mesenteric adenitis? Resolved  2  Hypothyrodism-mgt per endo  3  Depression wit anxiety-doing well with Lexapro; discussed current stress  4  Hyperlipidemia-due for BW    Plan  Check LP  Recheck 6 months

## 2021-07-29 ENCOUNTER — TELEPHONE (OUTPATIENT)
Dept: ENDOCRINOLOGY | Facility: CLINIC | Age: 61
End: 2021-07-29

## 2021-09-14 DIAGNOSIS — J45.20 ASTHMA IN ADULT, MILD INTERMITTENT, UNCOMPLICATED: ICD-10-CM

## 2021-09-17 RX ORDER — ALBUTEROL SULFATE 90 UG/1
2 AEROSOL, METERED RESPIRATORY (INHALATION) AS NEEDED
Qty: 18 G | Refills: 2 | Status: SHIPPED | OUTPATIENT
Start: 2021-09-17 | End: 2022-07-27 | Stop reason: SDUPTHER

## 2021-10-24 ENCOUNTER — PATIENT MESSAGE (OUTPATIENT)
Dept: ENDOCRINOLOGY | Facility: CLINIC | Age: 61
End: 2021-10-24

## 2021-10-24 LAB
CHOLEST SERPL-MCNC: 258 MG/DL
CHOLEST/HDLC SERPL: 3.7 (CALC)
HDLC SERPL-MCNC: 70 MG/DL
LDLC SERPL CALC-MCNC: 172 MG/DL (CALC)
NONHDLC SERPL-MCNC: 188 MG/DL (CALC)
T4 FREE SERPL-MCNC: 1.5 NG/DL (ref 0.8–1.8)
TRIGL SERPL-MCNC: 56 MG/DL
TSH SERPL-ACNC: 0.15 MIU/L (ref 0.4–4.5)

## 2021-11-02 ENCOUNTER — OFFICE VISIT (OUTPATIENT)
Dept: ENDOCRINOLOGY | Facility: CLINIC | Age: 61
End: 2021-11-02
Payer: COMMERCIAL

## 2021-11-02 VITALS
HEART RATE: 76 BPM | SYSTOLIC BLOOD PRESSURE: 120 MMHG | BODY MASS INDEX: 19.93 KG/M2 | WEIGHT: 127 LBS | DIASTOLIC BLOOD PRESSURE: 68 MMHG | TEMPERATURE: 98.7 F | HEIGHT: 67 IN

## 2021-11-02 DIAGNOSIS — M81.0 AGE-RELATED OSTEOPOROSIS WITHOUT CURRENT PATHOLOGICAL FRACTURE: ICD-10-CM

## 2021-11-02 DIAGNOSIS — E03.9 ACQUIRED HYPOTHYROIDISM: Primary | ICD-10-CM

## 2021-11-02 DIAGNOSIS — E55.9 VITAMIN D DEFICIENCY: ICD-10-CM

## 2021-11-02 PROCEDURE — 1036F TOBACCO NON-USER: CPT | Performed by: PHYSICIAN ASSISTANT

## 2021-11-02 PROCEDURE — 3008F BODY MASS INDEX DOCD: CPT | Performed by: PHYSICIAN ASSISTANT

## 2021-11-02 PROCEDURE — 99214 OFFICE O/P EST MOD 30 MIN: CPT | Performed by: PHYSICIAN ASSISTANT

## 2021-11-02 RX ORDER — LEVOTHYROXINE SODIUM 0.1 MG/1
TABLET ORAL
Qty: 90 TABLET | Refills: 1
Start: 2021-11-02 | End: 2021-12-30 | Stop reason: SDUPTHER

## 2021-11-02 RX ORDER — ALENDRONATE SODIUM 70 MG/1
70 TABLET ORAL
Qty: 12 TABLET | Refills: 1 | Status: SHIPPED | OUTPATIENT
Start: 2021-11-02 | End: 2022-05-03 | Stop reason: SDUPTHER

## 2021-11-15 ENCOUNTER — TELEPHONE (OUTPATIENT)
Dept: FAMILY MEDICINE CLINIC | Facility: MEDICAL CENTER | Age: 61
End: 2021-11-15

## 2021-11-15 DIAGNOSIS — E78.00 ELEVATED CHOLESTEROL: Primary | ICD-10-CM

## 2021-11-22 ENCOUNTER — TELEPHONE (OUTPATIENT)
Dept: FAMILY MEDICINE CLINIC | Facility: MEDICAL CENTER | Age: 61
End: 2021-11-22

## 2021-11-22 DIAGNOSIS — F41.8 DEPRESSION WITH ANXIETY: ICD-10-CM

## 2021-11-22 DIAGNOSIS — E78.5 HYPERLIPIDEMIA, UNSPECIFIED HYPERLIPIDEMIA TYPE: Primary | ICD-10-CM

## 2021-11-22 RX ORDER — ESCITALOPRAM OXALATE 10 MG/1
TABLET ORAL
Qty: 90 TABLET | Refills: 0 | Status: SHIPPED | OUTPATIENT
Start: 2021-11-22 | End: 2022-02-15

## 2021-12-18 ENCOUNTER — OFFICE VISIT (OUTPATIENT)
Dept: URGENT CARE | Facility: MEDICAL CENTER | Age: 61
End: 2021-12-18
Payer: COMMERCIAL

## 2021-12-18 ENCOUNTER — APPOINTMENT (OUTPATIENT)
Dept: RADIOLOGY | Facility: MEDICAL CENTER | Age: 61
End: 2021-12-18
Attending: PHYSICIAN ASSISTANT
Payer: COMMERCIAL

## 2021-12-18 VITALS
SYSTOLIC BLOOD PRESSURE: 145 MMHG | HEART RATE: 93 BPM | BODY MASS INDEX: 20.09 KG/M2 | TEMPERATURE: 98.2 F | WEIGHT: 128 LBS | HEIGHT: 67 IN | DIASTOLIC BLOOD PRESSURE: 65 MMHG | OXYGEN SATURATION: 96 %

## 2021-12-18 DIAGNOSIS — M79.641 RIGHT HAND PAIN: ICD-10-CM

## 2021-12-18 DIAGNOSIS — S60.221A CONTUSION OF RIGHT HAND, INITIAL ENCOUNTER: ICD-10-CM

## 2021-12-18 DIAGNOSIS — M79.672 LEFT FOOT PAIN: ICD-10-CM

## 2021-12-18 DIAGNOSIS — M79.672 LEFT FOOT PAIN: Primary | ICD-10-CM

## 2021-12-18 PROCEDURE — G0382 LEV 3 HOSP TYPE B ED VISIT: HCPCS | Performed by: PHYSICIAN ASSISTANT

## 2021-12-18 PROCEDURE — 73630 X-RAY EXAM OF FOOT: CPT

## 2021-12-18 PROCEDURE — 73130 X-RAY EXAM OF HAND: CPT

## 2021-12-30 DIAGNOSIS — E03.9 ACQUIRED HYPOTHYROIDISM: ICD-10-CM

## 2022-01-02 RX ORDER — LEVOTHYROXINE SODIUM 0.1 MG/1
TABLET ORAL
Qty: 90 TABLET | Refills: 1
Start: 2022-01-02 | End: 2022-01-06 | Stop reason: SDUPTHER

## 2022-01-06 ENCOUNTER — TELEPHONE (OUTPATIENT)
Dept: FAMILY MEDICINE CLINIC | Facility: MEDICAL CENTER | Age: 62
End: 2022-01-06

## 2022-01-06 DIAGNOSIS — E03.9 ACQUIRED HYPOTHYROIDISM: ICD-10-CM

## 2022-01-06 RX ORDER — LEVOTHYROXINE SODIUM 0.1 MG/1
TABLET ORAL
Qty: 90 TABLET | Refills: 1 | Status: SHIPPED | OUTPATIENT
Start: 2022-01-06 | End: 2022-01-17 | Stop reason: SDUPTHER

## 2022-01-16 LAB
T4 FREE SERPL-MCNC: 1.6 NG/DL (ref 0.8–1.8)
TSH SERPL-ACNC: 0.14 MIU/L (ref 0.4–4.5)

## 2022-01-17 DIAGNOSIS — E03.9 ACQUIRED HYPOTHYROIDISM: Primary | ICD-10-CM

## 2022-01-17 RX ORDER — LEVOTHYROXINE SODIUM 0.07 MG/1
75 TABLET ORAL DAILY
Qty: 90 TABLET | Refills: 1 | Status: SHIPPED | OUTPATIENT
Start: 2022-01-17 | End: 2022-05-03

## 2022-01-17 NOTE — TELEPHONE ENCOUNTER
----- Message from Soha Hu PA-C sent at 1/16/2022  2:45 PM EST -----  TSH is low at 0 14 and free T4 is high normal at 1 6  If currently taking levothyroxine 100 mcg 1 tablet daily Mon-Sat and 1/2 tablet on Sunday, then decrease dose to 75 mcg 1 tablet daily Mon-Sun  Order new script  Order repeat TSH and free T4 for 6 weeks

## 2022-02-13 DIAGNOSIS — F41.8 DEPRESSION WITH ANXIETY: ICD-10-CM

## 2022-02-15 RX ORDER — ESCITALOPRAM OXALATE 10 MG/1
TABLET ORAL
Qty: 90 TABLET | Refills: 0 | Status: SHIPPED | OUTPATIENT
Start: 2022-02-15 | End: 2022-06-28

## 2022-04-24 LAB
25(OH)D3 SERPL-MCNC: 30 NG/ML (ref 30–100)
BUN SERPL-MCNC: 26 MG/DL (ref 7–25)
BUN/CREAT SERPL: 47 (CALC) (ref 6–22)
CALCIUM SERPL-MCNC: 9.3 MG/DL (ref 8.6–10.4)
CHLORIDE SERPL-SCNC: 106 MMOL/L (ref 98–110)
CO2 SERPL-SCNC: 30 MMOL/L (ref 20–32)
CREAT SERPL-MCNC: 0.55 MG/DL (ref 0.5–0.99)
GLUCOSE SERPL-MCNC: 93 MG/DL (ref 65–99)
POTASSIUM SERPL-SCNC: 4.6 MMOL/L (ref 3.5–5.3)
SL AMB EGFR AFRICAN AMERICAN: 116 ML/MIN/1.73M2
SL AMB EGFR NON AFRICAN AMERICAN: 100 ML/MIN/1.73M2
SODIUM SERPL-SCNC: 140 MMOL/L (ref 135–146)
T4 FREE SERPL-MCNC: 1.2 NG/DL (ref 0.8–1.8)
TSH SERPL-ACNC: 5.05 MIU/L (ref 0.4–4.5)

## 2022-05-03 ENCOUNTER — OFFICE VISIT (OUTPATIENT)
Dept: ENDOCRINOLOGY | Facility: CLINIC | Age: 62
End: 2022-05-03
Payer: COMMERCIAL

## 2022-05-03 VITALS
WEIGHT: 129 LBS | HEIGHT: 67 IN | TEMPERATURE: 98.5 F | HEART RATE: 76 BPM | SYSTOLIC BLOOD PRESSURE: 136 MMHG | BODY MASS INDEX: 20.25 KG/M2 | DIASTOLIC BLOOD PRESSURE: 70 MMHG

## 2022-05-03 DIAGNOSIS — M81.0 AGE-RELATED OSTEOPOROSIS WITHOUT CURRENT PATHOLOGICAL FRACTURE: ICD-10-CM

## 2022-05-03 DIAGNOSIS — E55.9 VITAMIN D DEFICIENCY: ICD-10-CM

## 2022-05-03 DIAGNOSIS — E03.9 ACQUIRED HYPOTHYROIDISM: Primary | ICD-10-CM

## 2022-05-03 PROCEDURE — 1036F TOBACCO NON-USER: CPT | Performed by: PHYSICIAN ASSISTANT

## 2022-05-03 PROCEDURE — 99214 OFFICE O/P EST MOD 30 MIN: CPT | Performed by: PHYSICIAN ASSISTANT

## 2022-05-03 PROCEDURE — 3008F BODY MASS INDEX DOCD: CPT | Performed by: PHYSICIAN ASSISTANT

## 2022-05-03 RX ORDER — LEVOTHYROXINE SODIUM 88 UG/1
88 TABLET ORAL DAILY
Qty: 90 TABLET | Refills: 1 | Status: SHIPPED | OUTPATIENT
Start: 2022-05-03

## 2022-05-03 RX ORDER — ALENDRONATE SODIUM 70 MG/1
70 TABLET ORAL
Qty: 12 TABLET | Refills: 1 | Status: SHIPPED | OUTPATIENT
Start: 2022-05-03 | End: 2022-08-01

## 2022-05-03 NOTE — PROGRESS NOTES
Patient Progress Note    CC: hypothyroidism, osteoporosis    Referring Provider  Ivis Garcia Md  [facility]  [address] [address] [address],  [address]     History of Present Illness:     Patient is 60year old female here for follow-up of hypothyroidism and osteoporosis  She is currently on levothyroxine 75 mcg 1 tablet daily  Patient is taking it 1 hr before breakfast on an empty stomach and at least 4 hrs apart from supplements  Tolerating medication well  No history of external radiation to head/neck/chest   No family history of thyroid cancer  No recent Iodine loading in form of medication, biotin or kelp supplements or radiological diagnostic studies  Most recent TFTs were abnormal, TSH 5 05 and free T4 1 2   Her DEXA scan in Jan 2018 was suggestive of osteoporosis of the lumbar spine  She is currently taking vitamin D3 5000 IU daily and a multivitamin  Fosamax was started in Nov 2021  She was taking Fosamax 70 mg weekly and tolerating it well but medication apparently was not refilled and patient did not receive it  Repeat DEXA scan has been ordered but not completed  No recent falls or fractures         Patient Active Problem List   Diagnosis    Anemia    Thoracic aortic aneurysm without rupture (HCC)    Celiac disease    Iron deficiency anemia    Hypothyroidism    Hyperlipidemia    Age-related osteoporosis without current pathological fracture    Vitamin D deficiency    S/P insertion of iliac artery stent    Urinary symptom or sign    Left lower quadrant abdominal pain    Pain in left lumbar region of back     Past Medical History:   Diagnosis Date    Anemia 8/17/2018    Celiac disease     Chiari I malformation ([address] )     Hyperlipemia     Nonmelanoma skin cancer     last assessed 02/19/2015    Squamous cell cancer of skin of ala nasi     2012    Thoracic aortic aneurysm without rupture ([address] ) 8/17/2018    Thyroid disorder       Past Surgical History:   Procedure Laterality Date    CHOLECYSTECTOMY      COLECTOMY ZEB      COLONOSCOPY      resolved 2010    CORONARY ANGIOPLASTY WITH STENT PLACEMENT      celiac artery stent    HYSTERECTOMY  2001    LAPAROSCOPY      large intestine excision     OOPHORECTOMY Bilateral 2001    TRANSLUMINAL ANGIOPLASTY      intraoperative, renal artery       Family History   Problem Relation Age of Onset    Arthritis Mother     COPD Mother     Rheum arthritis Mother     COPD Father     Rheumatic fever Father     Rheum arthritis Father     Hypertension Father     Lung cancer Father     Crohn's disease Family     Psoriasis Family     Lupus Family     Ulcerative colitis Family     Breast cancer Cousin         over 48    No Known Problems Sister     No Known Problems Daughter     No Known Problems Maternal Grandmother     No Known Problems Maternal Grandfather     No Known Problems Paternal Grandmother     No Known Problems Paternal Grandfather     No Known Problems Son     No Known Problems Brother     No Known Problems Brother     No Known Problems Brother     No Known Problems Brother     Cancer Paternal Aunt     Cancer Paternal Uncle      Social History     Tobacco Use    Smoking status: Former Smoker    Smokeless tobacco: Never Used   Substance Use Topics    Alcohol use: Yes     Comment: 1 drink per month max     Allergies   Allergen Reactions    Morphine And Related Headache     Category: Adverse Reaction;     Statins     Nsaids Palpitations and Rash     Category:  Allergy;      Current Outpatient Medications   Medication Sig Dispense Refill    acetaminophen (TYLENOL) 500 mg tablet Take 2 tablets by mouth daily as needed      albuterol (PROVENTIL HFA,VENTOLIN HFA) 90 mcg/act inhaler INHALE 2 PUFFS AS NEEDED FOR WHEEZING 18 g 2    Cholecalciferol (Vitamin D) 125 MCG (5000 UT) CAPS Take by mouth      escitalopram (LEXAPRO) 10 mg tablet TAKE 1 TABLET BY MOUTH EVERY DAY 90 tablet 0    Multiple Vitamins-Minerals (MULTIVITAMIN ADULT PO) Take 1 tablet by mouth daily      rosuvastatin (CRESTOR) 5 mg tablet Take 1 tablet (5 mg total) by mouth daily 30 tablet 5    alendronate (FOSAMAX) 70 mg tablet Take 1 tablet (70 mg total) by mouth every 7 days 12 tablet 1    levothyroxine (Euthyrox) 88 mcg tablet Take 1 tablet (88 mcg total) by mouth daily 90 tablet 1     No current facility-administered medications for this visit  Review of Systems   Constitutional: Positive for fatigue  Negative for activity change, appetite change and unexpected weight change  HENT: Negative for trouble swallowing  Eyes: Negative for visual disturbance  Respiratory: Positive for shortness of breath (attributes it to COPD)  Cardiovascular: Negative for chest pain and palpitations  Gastrointestinal: Positive for diarrhea (multiple BMS in the morning and then resolves)  Negative for constipation  Endocrine: Positive for cold intolerance  Negative for heat intolerance  Musculoskeletal: Positive for back pain  Skin: Negative  Neurological: Negative for tremors  Psychiatric/Behavioral: Negative  Physical Exam:  Body mass index is 20 2 kg/m²  /70   Pulse 76   Temp 98 5 °F (36 9 °C) (Tympanic)   Ht 5' 7" (1 702 m)   Wt 58 5 kg (129 lb)   BMI 20 20 kg/m²    Wt Readings from Last 3 Encounters:   05/03/22 58 5 kg (129 lb)   12/18/21 58 1 kg (128 lb)   11/02/21 57 6 kg (127 lb)       Physical Exam  Vitals and nursing note reviewed  Constitutional:       Appearance: She is well-developed  HENT:      Head: Normocephalic  Eyes:      General: No scleral icterus  Pupils: Pupils are equal, round, and reactive to light  Neck:      Thyroid: No thyromegaly  Cardiovascular:      Rate and Rhythm: Normal rate and regular rhythm  Pulses:           Radial pulses are 2+ on the right side and 2+ on the left side  Heart sounds: No murmur heard        Pulmonary:      Effort: Pulmonary effort is normal  No respiratory distress  Breath sounds: Normal breath sounds  No wheezing  Musculoskeletal:      Cervical back: Neck supple  Skin:     General: Skin is warm and dry  Neurological:      Mental Status: She is alert  Deep Tendon Reflexes: Reflexes are normal and symmetric  Patient's shoes and socks were not removed  Labs:   No results found for: HGBA1C    Lab Results   Component Value Date    CHOL 211 (H) 09/09/2017    HDL 70 10/23/2021    TRIG 56 10/23/2021       Lab Results   Component Value Date    CALCIUM 9 3 04/23/2022     09/09/2017    K 4 6 04/23/2022    CO2 30 04/23/2022     04/23/2022    BUN 26 (H) 04/23/2022    CREATININE 0 55 04/23/2022        No results found for: EGFR    Lab Results   Component Value Date    ALT 17 05/10/2021    AST 23 05/10/2021    ALKPHOS 56 05/10/2021    BILITOT 0 4 09/09/2017       Lab Results   Component Value Date    SPE6YDPJNURW 0 25 (L) 12/16/2017    TSH 5 05 (H) 04/23/2022           Impression:  1  Acquired hypothyroidism    2  Vitamin D deficiency    3  Age-related osteoporosis without current pathological fracture         Plan:    Diagnoses and all orders for this visit:    Acquired hypothyroidism  TFTs were abnormal, TSH 5 05 and free T4 1 2   Increase levothyroxine to 88 mcg daily  Repeat TFTs in 6 weeks and prior to next visit  -     T4, free; Future  -     TSH, 3rd generation; Future  -     levothyroxine (Euthyrox) 88 mcg tablet; Take 1 tablet (88 mcg total) by mouth daily  -     T4, free; Future  -     TSH, 3rd generation; Future    Vitamin D deficiency  Vitamin D low normal at 30  Continue current supplementation  -     Vitamin D 25 hydroxy; Future    Age-related osteoporosis without current pathological fracture  Restart Fosamax 70 mg weekly; previously tolerated it well  Continue vitamin D   Check DEXA scan (reordered)  Take precautions to avoid falls  Recommended weight bearing exercises as tolerated  -     Basic metabolic panel;  Future  - alendronate (FOSAMAX) 70 mg tablet; Take 1 tablet (70 mg total) by mouth every 7 days  -     DXA bone density spine hip and pelvis; Future        Discussed with the patient and all questions fully answered  She will call me if any problems arise      Counseled patient on diagnostic results, prognosis, risk and benefit of treatment options, instruction for management, importance of treatment compliance, risk  factor reduction and impressions      Tamiko Martin PA-C

## 2022-05-19 NOTE — TELEPHONE ENCOUNTER
Patient called our office requesting a refill for Levothyroxine  Made a future appt for 11/08/18  Advised patient the provider is out of office and will be back Tuesday  Patient verbalized understanding  Routed call to Dr Marsha Major to review and advise  Former smoker

## 2022-06-28 DIAGNOSIS — F41.8 DEPRESSION WITH ANXIETY: ICD-10-CM

## 2022-06-28 RX ORDER — ESCITALOPRAM OXALATE 10 MG/1
TABLET ORAL
Qty: 90 TABLET | Refills: 0 | Status: SHIPPED | OUTPATIENT
Start: 2022-06-28

## 2022-07-27 ENCOUNTER — OFFICE VISIT (OUTPATIENT)
Dept: FAMILY MEDICINE CLINIC | Facility: MEDICAL CENTER | Age: 62
End: 2022-07-27
Payer: COMMERCIAL

## 2022-07-27 VITALS
SYSTOLIC BLOOD PRESSURE: 142 MMHG | WEIGHT: 129 LBS | DIASTOLIC BLOOD PRESSURE: 88 MMHG | BODY MASS INDEX: 20.25 KG/M2 | TEMPERATURE: 98 F | HEART RATE: 74 BPM | HEIGHT: 67 IN | OXYGEN SATURATION: 98 %

## 2022-07-27 DIAGNOSIS — E78.01 FAMILIAL HYPERCHOLESTEROLEMIA: ICD-10-CM

## 2022-07-27 DIAGNOSIS — J45.20 ASTHMA IN ADULT, MILD INTERMITTENT, UNCOMPLICATED: ICD-10-CM

## 2022-07-27 PROCEDURE — 99213 OFFICE O/P EST LOW 20 MIN: CPT

## 2022-07-27 PROCEDURE — 3725F SCREEN DEPRESSION PERFORMED: CPT

## 2022-07-27 RX ORDER — ALBUTEROL SULFATE 90 UG/1
2 AEROSOL, METERED RESPIRATORY (INHALATION) AS NEEDED
Qty: 18 G | Refills: 1 | Status: SHIPPED | OUTPATIENT
Start: 2022-07-27

## 2022-07-27 RX ORDER — ROSUVASTATIN CALCIUM 5 MG/1
5 TABLET, COATED ORAL DAILY
Qty: 90 TABLET | Refills: 0 | Status: SHIPPED | OUTPATIENT
Start: 2022-07-27 | End: 2022-10-28

## 2022-07-27 NOTE — PROGRESS NOTES
PRE-OPERATIVE EVALUATION  [facility] [place]    NAME: Isabelle Oneil  AGE: 60 y o  SEX: female  : 1960     DATE: 2022     Pre-Operative Evaluation      Chief Complaint: Pre-operative Evaluation     Surgery: Cataract  Anticipated Date of Surgery: right 8/3 and left   Referring Provider: Tali Mike MD       History of Present Illness: Isabelle Oneil is a 60 y o  female who presents to the office today for a preoperative consultation at the request of surgeon, Dr Zackary Blackwell, who plans on performing Cataract surgery on 8/3 for right eye and  for left eye  Planned anesthesia is unknown  Patient has a bleeding risk of: no recent abnormal bleeding, no remote history of abnormal bleeding and no use of Ca-channel blockers  Patient does not have objections to receiving blood products if needed  Current anti-platelet/anti-coagulation medications that the patient is prescribed includes: none  Assessment of Chronic Conditions:   - COPD: Uses albuterol inhaler prn   Non-smoker  Former smoker 50 years ago  Assessment of Cardiac Risk:  · Denies unstable or severe angina or MI in the last 6 weeks or history of stent placement in the last year   · Denies decompensated heart failure (e g  New onset heart failure, NYHA functional class IV heart failure, or worsening existing heart failure)  · Denies significant arrhythmias such as high grade AV block, symptomatic ventricular arrhythmia, newly recognized ventricular tachycardia, supraventricular tachycardia with resting heart rate >100, or symptomatic bradycardia  · Denies severe heart valve disease including aortic stenosis or symptomatic mitral stenosis     Exercise Capacity:  · Able to walk 4 blocks without symptoms?: Yes  · Able to walk 2 flights without symptoms?: No, Hx of COPD  Symptoms resolved with use of Albuterol inhaler per patient      Prior Anesthesia Reactions: No     Personal history of venous thromboembolic disease? No    History of steroid use for >2 weeks within last year? No         Review of Systems:   Review of Systems   Constitutional: Negative for activity change, appetite change, chills, diaphoresis, fatigue, fever and unexpected weight change  HENT: Negative for congestion, dental problem, drooling, ear discharge, ear pain, facial swelling, hearing loss, mouth sores, nosebleeds, postnasal drip, rhinorrhea, sinus pain and sinus pressure  Eyes: Negative for photophobia, pain, discharge, redness, itching and visual disturbance  Respiratory: Negative for apnea, cough, choking, chest tightness, shortness of breath, wheezing and stridor  Cardiovascular: Negative for chest pain, palpitations and leg swelling  Gastrointestinal: Negative for abdominal distention, abdominal pain, anal bleeding, blood in stool, constipation, diarrhea, nausea, rectal pain and vomiting  Endocrine: Negative for cold intolerance, heat intolerance, polydipsia, polyphagia and polyuria  Genitourinary: Negative for decreased urine volume, difficulty urinating, dyspareunia, dysuria, flank pain, hematuria and pelvic pain  Musculoskeletal: Negative for arthralgias, back pain, gait problem, joint swelling, myalgias, neck pain and neck stiffness  Skin: Negative for color change, pallor, rash and wound  Allergic/Immunologic: Negative for environmental allergies, food allergies and immunocompromised state  Neurological: Negative for dizziness, tremors, seizures, syncope, facial asymmetry, speech difficulty, weakness, light-headedness, numbness and headaches  Hematological: Negative for adenopathy  Does not bruise/bleed easily  Psychiatric/Behavioral: Negative for agitation, behavioral problems, confusion, decreased concentration, dysphoric mood, hallucinations, sleep disturbance and suicidal ideas  The patient is not nervous/anxious            Current Problem List:     Patient Active Problem List   Diagnosis    Anemia    Thoracic aortic aneurysm without rupture (HCC)    Celiac disease    Iron deficiency anemia    Hypothyroidism    Hyperlipidemia    Age-related osteoporosis without current pathological fracture    Vitamin D deficiency    S/P insertion of iliac artery stent    Urinary symptom or sign    Left lower quadrant abdominal pain    Pain in left lumbar region of back       Allergies: Allergies   Allergen Reactions    Morphine And Related Headache     Category: Adverse Reaction;     Nsaids Palpitations and Rash     Category: Allergy;         Medications:       Current Outpatient Medications:     acetaminophen (TYLENOL) 500 mg tablet, Take 2 tablets by mouth daily as needed, Disp: , Rfl:     albuterol (PROVENTIL HFA,VENTOLIN HFA) 90 mcg/act inhaler, Inhale 2 puffs as needed for wheezing, Disp: 18 g, Rfl: 1    alendronate (FOSAMAX) 70 mg tablet, Take 1 tablet (70 mg total) by mouth every 7 days, Disp: 12 tablet, Rfl: 1    Cholecalciferol (Vitamin D) 125 MCG (5000 UT) CAPS, Take by mouth, Disp: , Rfl:     escitalopram (LEXAPRO) 10 mg tablet, TAKE 1 TABLET BY MOUTH EVERY DAY, Disp: 90 tablet, Rfl: 0    levothyroxine (Euthyrox) 88 mcg tablet, Take 1 tablet (88 mcg total) by mouth daily, Disp: 90 tablet, Rfl: 1    Multiple Vitamins-Minerals (MULTIVITAMIN ADULT PO), Take 1 tablet by mouth daily, Disp: , Rfl:     rosuvastatin (CRESTOR) 5 mg tablet, Take 1 tablet (5 mg total) by mouth daily, Disp: 90 tablet, Rfl: 0    Past Medical History:       Past Medical History:   Diagnosis Date    Anemia 8/17/2018    Celiac disease     [facility]     Hyperlipemia     Nonmelanoma skin cancer     last assessed 02/19/2015    Squamous cell cancer of skin of ala nasi     2012    Thoracic aortic aneurysm without rupture [address] [address] ) 8/17/2018    Thyroid disorder         Past Surgical History:   Procedure Laterality Date    CHOLECYSTECTOMY      COLECTOMY ZEB      COLONOSCOPY      resolved 2010    CORONARY ANGIOPLASTY WITH STENT PLACEMENT      celiac artery stent    HYSTERECTOMY  2001    LAPAROSCOPY      large intestine excision     OOPHORECTOMY Bilateral 2001    TRANSLUMINAL ANGIOPLASTY      intraoperative, renal artery         Family History   Problem Relation Age of Onset    Arthritis Mother     COPD Mother     Rheum arthritis Mother     COPD Father     Rheumatic fever Father     Rheum arthritis Father     Hypertension Father     Lung cancer Father     Crohn's disease Family     Psoriasis Family     Lupus Family     Ulcerative colitis Family     Breast cancer Cousin         over 48    No Known Problems Sister     No Known Problems Daughter     No Known Problems Maternal Grandmother     No Known Problems Maternal Grandfather     No Known Problems Paternal Grandmother     No Known Problems Paternal Grandfather     No Known Problems Son     No Known Problems Brother     No Known Problems Brother     No Known Problems Brother     No Known Problems Brother     Cancer Paternal Aunt     Cancer Paternal Uncle         Social History     Socioeconomic History    Marital status: /Civil Union     Spouse name: Not on file    Number of children: Not on file    Years of education: Not on file    Highest education level: Not on file   Occupational History    Occupation: CNA    Occupation: med     Tobacco Use    Smoking status: Former Smoker    Smokeless tobacco: Never Used   Substance and Sexual Activity    Alcohol use: Yes     Comment: 1 drink per month max    Drug use: No    Sexual activity: Not on file   Other Topics Concern    Not on file   Social History Narrative    Caffeine use noted      Social Determinants of Health     Financial Resource Strain: Not on file   Food Insecurity: Not on file   Transportation Needs: Not on file   Physical Activity: Not on file   Stress: Not on file   Social Connections: Not on file   Intimate Partner Violence: Not on file   Housing Stability: Not on file        Physical Exam:     Vitals:    07/27/22 1639   BP: 142/88   Pulse: 74   Temp: 98 °F (36 7 °C)   SpO2: 98%       Physical Exam   Constitutional: She is oriented to person, place, and time  She appears well-developed and well-nourished  No distress  HENT:   Head: Normocephalic and atraumatic  Right Ear: External ear normal    Left Ear: External ear normal    Nose: Nose normal    Mouth/Throat: Oropharynx is clear and moist  No oropharyngeal exudate  Eyes: Conjunctivae and EOM are normal  Pupils are equal, round, and reactive to light  Right eye exhibits no discharge  Left eye exhibits no discharge  Neck: Normal range of motion  Neck supple  No JVD present  No thyromegaly present  Cardiovascular: Normal rate, regular rhythm, normal heart sounds and intact distal pulses  Exam reveals no gallop and no friction rub  No murmur heard  Pulmonary/Chest: Effort normal and breath sounds normal  No respiratory distress  She has no wheezes  She has no rales  She exhibits no tenderness  Abdominal: Soft  Bowel sounds are normal  She exhibits no distension and no mass  There is no tenderness  There is no rebound and no guarding  Musculoskeletal: Normal range of motion  She exhibits no edema, tenderness or deformity  Lymphadenopathy:     She has no cervical adenopathy  Neurological: She is alert and oriented to person, place, and time  Coordination normal    Skin: Skin is warm and dry  No rash noted  She is not diaphoretic  No erythema  Psychiatric: She has a normal mood and affect  Her behavior is normal  Judgment and thought content normal    Nursing note and vitals reviewed  Data:     Pre-operative work-up    Laboratory Results: N/A    EKG: N/A    Chest x-ray: N/A    Previous cardiopulmonary studies within the past year:  · Echocardiogram: N/A  · Cardiac Catheterization: N/A  · Stress Test: N/A  · Pulmonary Function Testing: N/A      Assessment & Recommendations:     1  Familial hypercholesterolemia  rosuvastatin (CRESTOR) 5 mg tablet   2  Asthma in adult, mild intermittent, uncomplicated  albuterol (PROVENTIL HFA,VENTOLIN HFA) 90 mcg/act inhaler       Pre-Op Evaluation Assessment  60 y o  female with planned surgery: cataract repair  Known risk factors for perioperative complications: Anemia  Chronic pulmonary disease  Cardiac Risk Estimation: per the Revised Cardiac Risk Index (Circ  100:1043, 1999), the patient's risk factors for cardiac complications include none, putting her in: RCI RISK CLASS I (0 risk factors, risk of major cardiac compl  appr  0 5%)  Current medications which may produce withdrawal symptoms if withheld perioperatively: none  Pre-Op Evaluation Plan  1  Further preoperative workup as follows:   - None; no further preoperative work-up is required    2  Medication Management/Recommendations:   - None, continue medication regimen including morning of surgery, with sip of water    3  Prophylaxis for cardiac events with perioperative beta-blockers: not indicated  4  Patient requires further consultation with: None    Clearance  Patient is CLEARED for surgery without any additional cardiac testing       MARY ANN Tavarez  [facility]  Maurice Gomes [facility] [address] [address]  Phone#  [phone]  Fax#  [phone]

## 2022-08-17 DIAGNOSIS — I71.20 THORACIC AORTIC ANEURYSM WITHOUT RUPTURE: Primary | ICD-10-CM

## 2022-09-07 ENCOUNTER — HOSPITAL ENCOUNTER (OUTPATIENT)
Dept: RADIOLOGY | Facility: MEDICAL CENTER | Age: 62
Discharge: HOME | End: 2022-09-07
Payer: COMMERCIAL

## 2022-09-07 DIAGNOSIS — I71.20 THORACIC AORTIC ANEURYSM WITHOUT RUPTURE: ICD-10-CM

## 2022-09-07 PROCEDURE — 71250 CT THORAX DX C-: CPT

## 2022-09-07 PROCEDURE — G1004 CDSM NDSC: HCPCS

## 2022-09-16 ENCOUNTER — OFFICE VISIT (OUTPATIENT)
Dept: CARDIAC SURGERY | Facility: CLINIC | Age: 62
End: 2022-09-16
Payer: COMMERCIAL

## 2022-09-16 VITALS
HEIGHT: 67 IN | OXYGEN SATURATION: 100 % | BODY MASS INDEX: 19.89 KG/M2 | WEIGHT: 126.7 LBS | DIASTOLIC BLOOD PRESSURE: 65 MMHG | HEART RATE: 71 BPM | SYSTOLIC BLOOD PRESSURE: 141 MMHG | TEMPERATURE: 98.7 F

## 2022-09-16 DIAGNOSIS — I71.20 THORACIC AORTIC ANEURYSM WITHOUT RUPTURE: Primary | ICD-10-CM

## 2022-09-16 DIAGNOSIS — R06.02 SHORTNESS OF BREATH: ICD-10-CM

## 2022-09-16 PROCEDURE — 99214 OFFICE O/P EST MOD 30 MIN: CPT | Performed by: PHYSICIAN ASSISTANT

## 2022-09-16 RX ORDER — NEOMYCIN SULFATE, POLYMYXIN B SULFATE AND DEXAMETHASONE 3.5; 10000; 1 MG/ML; [USP'U]/ML; MG/ML
SUSPENSION/ DROPS OPHTHALMIC
COMMUNITY
Start: 2022-08-02

## 2022-09-16 NOTE — PROGRESS NOTES
[facility]  Isabelle Oneil 60 y o  female MRN: [id number]    Physician Requesting Consult: No att  providers found    Reason for Consult / Principal Problem: Aortic aneurysm    History of Present Illness: Isabelle Oneil is a 60y o  year old female who presents today for ongoing surveillance of their ascending aortic aneurysm  This was initially identified in 2016  Isabelle Oneil was most recently evaluated in our office in 2020  At that time the maximal ascending aortic diameter was measured at 40 mm  She was instructed to return to our office in 2 years with repeat CT chest for surveillance  She had a CT chest without contrast on 9/7, which revealed stable ascending aorta measuring 4 0 cm, descending thoracic aorta measuring 4 1 cm, and proximal abdominal aorta measuring 3 6 cm  She presents today to review testing  Abdulkadir Omer reports the following updates to her medical/surgical history and symptoms: she had bilateral cataract surgery this year, and she has been experiencing shortness of breath with walking up stairs  She states that she takes an elevator when she can, but she does get short of breath walking up from her basement, about half way up the stairs  She remains active, walking on level ground daily  She was started on an albuterol inhaler for her shortness of breath, and was told that she had COPD, but does not find the inhaler helpful  She denies chest pain, LE edema, numbness/tingling/paresthesias, arm pain, upper back pain, dizziness, syncope  She is a lifelong nonsmoker, does not drink alcohol, and does not use illicit drugs  She has been abiding by her lifting restrictions  She works as an  at a nursing facility         Past Medical History:  Past Medical History:   Diagnosis Date    Anemia 8/17/2018    Celiac disease     [facility]ari I malformation [facility] [address]     Hyperlipemia     Nonmelanoma skin cancer     last assessed 02/19/2015    Squamous cell cancer of skin of ala nasi     2012    Thoracic aortic aneurysm without rupture ([facility] [address] 8/17/2018    Thyroid disorder          Past Surgical History:   Past Surgical History:   Procedure Laterality Date    CHOLECYSTECTOMY      COLECTOMY ZEB      COLONOSCOPY      resolved 2010    CORONARY ANGIOPLASTY WITH STENT PLACEMENT      celiac artery stent    HYSTERECTOMY  2001    LAPAROSCOPY      large intestine excision     OOPHORECTOMY Bilateral 2001    TRANSLUMINAL ANGIOPLASTY      intraoperative, renal artery          Family History:  Family History   Problem Relation Age of Onset    Arthritis Mother     COPD Mother     Rheum arthritis Mother     COPD Father     Rheumatic fever Father     Rheum arthritis Father     Hypertension Father     Lung cancer Father     Crohn's disease Family     Psoriasis Family     Lupus Family     Ulcerative colitis Family     Breast cancer Cousin         over 48    No Known Problems Sister     No Known Problems Daughter     No Known Problems Maternal Grandmother     No Known Problems Maternal Grandfather     No Known Problems Paternal Grandmother     No Known Problems Paternal Grandfather     No Known Problems Son     No Known Problems Brother     No Known Problems Brother     No Known Problems Brother     No Known Problems Brother     Cancer Paternal Aunt     Cancer Paternal Uncle          Social History:  Social History     Substance and Sexual Activity   Alcohol Use Yes    Comment: 1 drink per month max     Social History     Substance and Sexual Activity   Drug Use No     Social History     Tobacco Use   Smoking Status Former Smoker   Smokeless Tobacco Never Used         Home Medications:   Prior to Admission medications    Medication Sig Start Date End Date Taking?  Authorizing Provider   acetaminophen (TYLENOL) 500 mg tablet Take 2 tablets by mouth daily as needed   Yes Historical Provider, MD   albuterol (PROVENTIL HFA,VENTOLIN HFA) 90 mcg/act inhaler Inhale 2 puffs as needed for wheezing 7/27/22  Yes MARY ANN Segovia   Cholecalciferol (Vitamin D) 125 MCG (5000 UT) CAPS Take by mouth   Yes Historical Provider, MD   escitalopram (LEXAPRO) 10 mg tablet TAKE 1 TABLET BY MOUTH EVERY DAY 6/28/22  Yes MARY ANN Segovia   levothyroxine (Euthyrox) 88 mcg tablet Take 1 tablet (88 mcg total) by mouth daily 5/3/22  Yes Tamiko Martin PA-C   Multiple Vitamins-Minerals (MULTIVITAMIN ADULT PO) Take 1 tablet by mouth daily   Yes Historical Provider, MD   neomycin-polymyxin-dexamethasone (MAXITROL) ophthalmic suspension ONE DROP IN THE SURGICAL EYE 4 TIMES A DAY STARTING 1 DAY PRIOR TO SURGERY 8/2/22  Yes Historical Provider, MD   alendronate (FOSAMAX) 70 mg tablet Take 1 tablet (70 mg total) by mouth every 7 days 5/3/22 8/1/22  Tamiko Martin PA-C   rosuvastatin (CRESTOR) 5 mg tablet Take 1 tablet (5 mg total) by mouth daily  Patient not taking: No sig reported 7/27/22   MARY ANN Segovia       Allergies: Allergies   Allergen Reactions    Morphine And Related Headache     Category: Adverse Reaction;         Review of Systems:   Review of Systems - History obtained from the patient  General ROS: negative  Psychological ROS: negative  Ophthalmic ROS: negative  ENT ROS: negative  Allergy and Immunology ROS: negative  Hematological and Lymphatic ROS: negative  Endocrine ROS: negative   Respiratory ROS: positive for - shortness of breath with exertion  Cardiovascular ROS: positive for - dyspnea on exertion  Gastrointestinal ROS: no abdominal pain, change in bowel habits, or black or bloody stools  Genito-Urinary ROS: no dysuria, trouble voiding, or hematuria  Musculoskeletal ROS: negative  Neurological ROS: no TIA or stroke symptoms  Dermatological ROS: negative    Vital Signs:   Vitals:    09/16/22 1006 09/16/22 1008   BP: 134/62 141/65   BP Location: Left arm Right arm   Patient Position: Sitting Sitting   Cuff Size: Standard Standard   Pulse: 71    Temp: 98 7 °F (37 1 °C) TempSrc: Tympanic    SpO2: 100%    Weight: 57 5 kg (126 lb 11 2 oz)    Height: 5' 7" (1 702 m)        Physical Exam:  General: NAD, pleasant  HEENT/NECK:  PERRL  No jugular venous distention  Cardiac:Regular rate and rhythm, No Murmur  Carotid arteries: brisk, no bruits   Pulmonary:  Breath sounds clear bilaterally  Abdomen:  Non-tender, Non-distended  Positive bowel sounds  Upper extremities: 2+ radial pulses; brisk capillary refill  Lower extremities: Extremities warm/dry  PT/DP pulses 2+ bilaterally  No edema B/L  Neuro: Alert and oriented X 3  Sensation is grossly intact  No focal deficits  Musculoskeletal: MONTALVO   Skin: Warm/Dry, without rashes or lesions  Lab Results:               Invalid input(s): LABGLOM      No results found for: HGBA1C  No results found for: CKTOTAL, CKMB, CKMBINDEX, TROPONINI    Imaging Studies:     CT Chest: 9/7/22  CT CHEST WITHOUT IV CONTRAST     INDICATION:   I71 2: Thoracic aortic aneurysm, without rupture      COMPARISON:  Chest CT 4/26/2021 and 6/13/2016      TECHNIQUE: Chest CT without intravenous contrast   Axial, sagittal, coronal 2D reformats and coronal MIPS from source data      Radiation dose length product (DLP):  156 mGy-cm   Radiation dose exposure minimized using iterative reconstruction and automated exposure control      FINDINGS:     LUNGS:  Lungs clear  Benign calcified granulomas      AIRWAYS: No significant filling defects      PLEURA:  Unremarkable      HEART/GREAT VESSELS:  Normal heart size  Stable minimal ectasia of the ascending aorta at 4 0 cm and stable fusiform aneurysmal dilation of the descending aorta at 4 1 cm since 2016      MEDIASTINUM AND SUNNI:  Unremarkable      CHEST WALL AND LOWER NECK: Unremarkable      UPPER ABDOMEN:  Stable aneurysm of the proximal abdominal aorta at 3 6 cm  Celiac stent  Right hepatic cyst  Cholecystectomy    Benign calcified splenic granulomas        OSSEOUS STRUCTURES: Normal for age      IMPRESSION:     Stable ectasia/aneurysm of the aorta since 2016 with ascending aorta at 4 0 cm, descending aorta at 4 1 cm, and proximal abdominal aorta at 3 6 cm  Continue yearly follow-up as clinically warranted      This study was marked in Epic for follow-up  I have personally reviewed pertinent reports  Assessment:  Patient Active Problem List    Diagnosis Date Noted    S/P insertion of iliac artery stent 04/26/2021    Urinary symptom or sign 04/26/2021    Left lower quadrant abdominal pain 04/26/2021    Pain in left lumbar region of back 04/26/2021    Age-related osteoporosis without current pathological fracture 11/19/2020    Vitamin D deficiency 11/19/2020    Anemia 08/17/2018    Thoracic aortic aneurysm without rupture ([facility] [address] ) 08/17/2018    Iron deficiency anemia 05/06/2016    Celiac disease 05/20/2013    Hypothyroidism 05/02/2012    Hyperlipidemia 05/02/2012     Ascending aortic aneurysm; Ongoing ascending aortic replacement workup    Plan:    CT imaging performed prior to this visit demonstrates the ascending aorta measuring 40 mm in size at its greatest diameter  These findings were confirmed and shared with the patient today  As they are asymptomatic, with no family history, follow-up monitoring is the treatment plan  Arrangements have been made for future surveillance to be completed with CT chest, without contrast in 2 Years  We will also refer to Cardiology for work up of shortness of breath with exertion  Vicky Vázquez was comfortable with our recommendations, and their questions were answered to their satisfaction  Thank you for allowing us to participate in the care of this patient  Aortic Aneurysm Instructions were provided to the patient as follows:    1  No lifting more than 50 pounds  Regular aerobic exercise permitted and recommended  2  Maintain a controlled blood pressure with a goal of less than 120/80    3  Follow up in Aortic Clinic as recommended with radiology follow up as instructed  4  Report to the ER or call 911 immediately with the following signs / symptoms: sudden onset of back pain, chest pain or shortness of breath  5  Tobacco cessation discussed  The patient recently had a screening colonoscopy in 2019  Therefore GI referral is not indicated at this time       SIGNATURE: Carolyn Kelsey PA-C  DATE: September 16, 2022  TIME: 10:13 AM

## 2022-09-16 NOTE — LETTER
September 16, 2022     Talisha Hassan MD  [address]    Patient: Vicky Vázquez   YOB: 1960   Date of Visit: 9/16/2022       Dear Dr GAUTAM PSIQUIATRICO DR EVONNE ALBRECHT: Thank you for referring Terence Harden to me for evaluation  Below are my notes for this consultation  If you have questions, please do not hesitate to call me  I look forward to following your patient along with you  Sincerely,        Estela Leon MD        CC: Ngoc Robertson, [facility], [facility]  9/16/2022 11:05 AM  Attested  Aortic Clinic  Vicky Vázquez 60 y o  female MRN: [id number]    Physician Requesting Consult: No att  providers found    Reason for Consult / Principal Problem: Aortic aneurysm    History of Present Illness: Vicky Vázquez is a 60y o  year old female who presents today for ongoing surveillance of their ascending aortic aneurysm  This was initially identified in 2016  Vicky Vázquez was most recently evaluated in our office in 2020  At that time the maximal ascending aortic diameter was measured at 40 mm  She was instructed to return to our office in 2 years with repeat CT chest for surveillance  She had a CT chest without contrast on 9/7, which revealed stable ascending aorta measuring 4 0 cm, descending thoracic aorta measuring 4 1 cm, and proximal abdominal aorta measuring 3 6 cm  She presents today to review testing  Vanesa Nunezlucilletammy reports the following updates to her medical/surgical history and symptoms: she had bilateral cataract surgery this year, and she has been experiencing shortness of breath with walking up stairs  She states that she takes an elevator when she can, but she does get short of breath walking up from her basement, about half way up the stairs  She remains active, walking on level ground daily   She was started on an albuterol inhaler for her shortness of breath, and was told that she had COPD, but does not find the inhaler helpful  She denies chest pain, LE edema, numbness/tingling/paresthesias, arm pain, upper back pain, dizziness, syncope  She is a lifelong nonsmoker, does not drink alcohol, and does not use illicit drugs  She has been abiding by her lifting restrictions  She works as an  at a nursing facility         Past Medical History:  Past Medical History:   Diagnosis Date    Anemia 8/17/2018    Celiac disease     Chiari I malformation ([facility] [address]     Hyperlipemia     Nonmelanoma skin cancer     last assessed 02/19/2015    Squamous cell cancer of skin of ala nasi     2012    Thoracic aortic aneurysm without rupture ([facility] [address] ) 8/17/2018    Thyroid disorder          Past Surgical History:   Past Surgical History:   Procedure Laterality Date    CHOLECYSTECTOMY      COLECTOMY ZEB      COLONOSCOPY      resolved 2010    CORONARY ANGIOPLASTY WITH STENT PLACEMENT      celiac artery stent    HYSTERECTOMY  2001    LAPAROSCOPY      large intestine excision     OOPHORECTOMY Bilateral 2001    TRANSLUMINAL ANGIOPLASTY      intraoperative, renal artery          Family History:  Family History   Problem Relation Age of Onset    Arthritis Mother     COPD Mother     Rheum arthritis Mother     COPD Father     Rheumatic fever Father     Rheum arthritis Father     Hypertension Father     Lung cancer Father     Crohn's disease Family     Psoriasis Family     Lupus Family     Ulcerative colitis Family     Breast cancer Cousin         over 48    No Known Problems Sister     No Known Problems Daughter     No Known Problems Maternal Grandmother     No Known Problems Maternal Grandfather     No Known Problems Paternal Grandmother     No Known Problems Paternal Grandfather     No Known Problems Son     No Known Problems Brother     No Known Problems Brother     No Known Problems Brother     No Known Problems Brother     Cancer Paternal Aunt     Cancer Paternal Uncle          Social History:  Social History Substance and Sexual Activity   Alcohol Use Yes    Comment: 1 drink per month max     Social History     Substance and Sexual Activity   Drug Use No     Social History     Tobacco Use   Smoking Status Former Smoker   Smokeless Tobacco Never Used         Home Medications:   Prior to Admission medications    Medication Sig Start Date End Date Taking? Authorizing Provider   acetaminophen (TYLENOL) 500 mg tablet Take 2 tablets by mouth daily as needed   Yes Historical Provider, MD   albuterol (PROVENTIL HFA,VENTOLIN HFA) 90 mcg/act inhaler Inhale 2 puffs as needed for wheezing 7/27/22  Yes Ngoc Bowling, CRNP   Cholecalciferol (Vitamin D) 125 MCG (5000 UT) CAPS Take by mouth   Yes Historical Provider, MD   escitalopram (LEXAPRO) 10 mg tablet TAKE 1 TABLET BY MOUTH EVERY DAY 6/28/22  Yes Ngoc Bowling, CRNP   levothyroxine (Euthyrox) 88 mcg tablet Take 1 tablet (88 mcg total) by mouth daily 5/3/22  Yes Tamiko Martin PA-C   Multiple Vitamins-Minerals (MULTIVITAMIN ADULT PO) Take 1 tablet by mouth daily   Yes Historical Provider, MD   neomycin-polymyxin-dexamethasone (MAXITROL) ophthalmic suspension ONE DROP IN THE SURGICAL EYE 4 TIMES A DAY STARTING 1 DAY PRIOR TO SURGERY 8/2/22  Yes Historical Provider, MD   alendronate (FOSAMAX) 70 mg tablet Take 1 tablet (70 mg total) by mouth every 7 days 5/3/22 8/1/22  Tamiko Martin PA-C   rosuvastatin (CRESTOR) 5 mg tablet Take 1 tablet (5 mg total) by mouth daily  Patient not taking: No sig reported 7/27/22   Ngoc Bowling, CRNP       Allergies: Allergies   Allergen Reactions    Morphine And Related Headache     Category: Adverse Reaction;         Review of Systems:   Review of Systems - History obtained from the patient  General ROS: negative  Psychological ROS: negative  Ophthalmic ROS: negative  ENT ROS: negative  Allergy and Immunology ROS: negative  Hematological and Lymphatic ROS: negative  Endocrine ROS: negative   Respiratory ROS: positive for - shortness of breath with exertion  Cardiovascular ROS: positive for - dyspnea on exertion  Gastrointestinal ROS: no abdominal pain, change in bowel habits, or black or bloody stools  Genito-Urinary ROS: no dysuria, trouble voiding, or hematuria  Musculoskeletal ROS: negative  Neurological ROS: no TIA or stroke symptoms  Dermatological ROS: negative    Vital Signs:   Vitals:    09/16/22 1006 09/16/22 1008   BP: 134/62 141/65   BP Location: Left arm Right arm   Patient Position: Sitting Sitting   Cuff Size: Standard Standard   Pulse: 71    Temp: 98 7 °F (37 1 °C)    TempSrc: Tympanic    SpO2: 100%    Weight: 57 5 kg (126 lb 11 2 oz)    Height: 5' 7" (1 702 m)        Physical Exam:  General: NAD, pleasant  HEENT/NECK:  PERRL  No jugular venous distention  Cardiac:Regular rate and rhythm, No Murmur  Carotid arteries: brisk, no bruits   Pulmonary:  Breath sounds clear bilaterally  Abdomen:  Non-tender, Non-distended  Positive bowel sounds  Upper extremities: 2+ radial pulses; brisk capillary refill  Lower extremities: Extremities warm/dry  PT/DP pulses 2+ bilaterally  No edema B/L  Neuro: Alert and oriented X 3  Sensation is grossly intact  No focal deficits  Musculoskeletal: MONTALVO   Skin: Warm/Dry, without rashes or lesions  Lab Results:               Invalid input(s): LABGLOM      No results found for: HGBA1C  No results found for: CKTOTAL, CKMB, CKMBINDEX, TROPONINI    Imaging Studies:     CT Chest: 9/7/22  CT CHEST WITHOUT IV CONTRAST     INDICATION:   I71 2: Thoracic aortic aneurysm, without rupture      COMPARISON:  Chest CT 4/26/2021 and 6/13/2016      TECHNIQUE: Chest CT without intravenous contrast   Axial, sagittal, coronal 2D reformats and coronal MIPS from source data      Radiation dose length product (DLP):  156 mGy-cm   Radiation dose exposure minimized using iterative reconstruction and automated exposure control      FINDINGS:     LUNGS:  Lungs clear   Benign calcified granulomas      AIRWAYS: No significant filling defects      PLEURA:  Unremarkable      HEART/GREAT VESSELS:  Normal heart size  Stable minimal ectasia of the ascending aorta at 4 0 cm and stable fusiform aneurysmal dilation of the descending aorta at 4 1 cm since 2016      MEDIASTINUM AND SUNNI:  Unremarkable      CHEST WALL AND LOWER NECK: Unremarkable      UPPER ABDOMEN:  Stable aneurysm of the proximal abdominal aorta at 3 6 cm  Celiac stent  Right hepatic cyst  Cholecystectomy  Benign calcified splenic granulomas        OSSEOUS STRUCTURES: Normal for age      IMPRESSION:     Stable ectasia/aneurysm of the aorta since 2016 with ascending aorta at 4 0 cm, descending aorta at 4 1 cm, and proximal abdominal aorta at 3 6 cm  Continue yearly follow-up as clinically warranted      This study was marked in Epic for follow-up  I have personally reviewed pertinent reports  Assessment:  Patient Active Problem List    Diagnosis Date Noted    S/P insertion of iliac artery stent 04/26/2021    Urinary symptom or sign 04/26/2021    Left lower quadrant abdominal pain 04/26/2021    Pain in left lumbar region of back 04/26/2021    Age-related osteoporosis without current pathological fracture 11/19/2020    Vitamin D deficiency 11/19/2020    Anemia 08/17/2018    Thoracic aortic aneurysm without rupture ([facility] [address] 08/17/2018    Iron deficiency anemia 05/06/2016    Celiac disease 05/20/2013    Hypothyroidism 05/02/2012    Hyperlipidemia 05/02/2012     Ascending aortic aneurysm; Ongoing ascending aortic replacement workup    Plan:    CT imaging performed prior to this visit demonstrates the ascending aorta measuring 40 mm in size at its greatest diameter  These findings were confirmed and shared with the patient today  As they are asymptomatic, with no family history, follow-up monitoring is the treatment plan  Arrangements have been made for future surveillance to be completed with CT chest, without contrast in 2 Years      We will also refer to Cardiology for work up of shortness of breath with exertion  Bethany Aceves was comfortable with our recommendations, and their questions were answered to their satisfaction  Thank you for allowing us to participate in the care of this patient  Aortic Aneurysm Instructions were provided to the patient as follows:    1  No lifting more than 50 pounds  Regular aerobic exercise permitted and recommended  2  Maintain a controlled blood pressure with a goal of less than 120/80  3  Follow up in Aortic Clinic as recommended with radiology follow up as instructed  4  Report to the ER or call 911 immediately with the following signs / symptoms: sudden onset of back pain, chest pain or shortness of breath  5  Tobacco cessation discussed  The patient recently had a screening colonoscopy in 2019  Therefore GI referral is not indicated at this time  SIGNATURE: Matias Eckert PA-C  DATE: September 16, 2022  TIME: 10:13 AM       Attestation signed by Kelsie Hoyos MD at 9/16/2022 12:13 PM:  I supervised the Advanced Practitioner  ? In addition to personally performing portions of the history and physical exam, I performed, in its entirety, the assessment/plan/medical decision making/counseling/care coordination component of the visit  I reviewed the Advanced Practitioner's note,  medications prescribed and orders placed  Medical decision making is detailed below: Joana Zarate follows up for surveillance of her aneurysmal DTA  Today's imaging, reviewed by me, shows stable maximal diameter of 4 1 cm in a heavily calcified descending thoracic and abdominal aorta  I've recommended 2 yr surveillance imaging and follow up  Separately, she notes marked exertional shortness of breath which another doctor told her was "COPD" many years ago  However, she has no risk factors for COPD, has never had PFT's, and has normal appearing lungs on CT  I did not hear a murmur on auscultation  She has never seen a cardiologist or had any cardiac workup, so I've referred her to Dr Jerris Severin for an initial cardiology evaluation        Vazquez Rubi MD  09/16/22  12:09 PM

## 2022-10-01 DIAGNOSIS — F41.8 DEPRESSION WITH ANXIETY: ICD-10-CM

## 2022-10-03 RX ORDER — ESCITALOPRAM OXALATE 10 MG/1
TABLET ORAL
Qty: 90 TABLET | Refills: 0 | Status: SHIPPED | OUTPATIENT
Start: 2022-10-03

## 2022-10-09 LAB
25(OH)D3 SERPL-MCNC: 27 NG/ML (ref 30–100)
BUN SERPL-MCNC: 26 MG/DL (ref 7–25)
BUN/CREAT SERPL: 54 (CALC) (ref 6–22)
CALCIUM SERPL-MCNC: 9.2 MG/DL (ref 8.6–10.4)
CHLORIDE SERPL-SCNC: 107 MMOL/L (ref 98–110)
CO2 SERPL-SCNC: 29 MMOL/L (ref 20–32)
CREAT SERPL-MCNC: 0.48 MG/DL (ref 0.5–1.05)
GFR/BSA.PRED SERPLBLD CYS-BASED-ARV: 106 ML/MIN/1.73M2
GLUCOSE SERPL-MCNC: 89 MG/DL (ref 65–99)
POTASSIUM SERPL-SCNC: 4.5 MMOL/L (ref 3.5–5.3)
SODIUM SERPL-SCNC: 142 MMOL/L (ref 135–146)
T4 FREE SERPL-MCNC: 1.4 NG/DL (ref 0.8–1.8)
TSH SERPL-ACNC: 0.25 MIU/L (ref 0.4–4.5)

## 2022-10-10 DIAGNOSIS — E03.9 ACQUIRED HYPOTHYROIDISM: ICD-10-CM

## 2022-10-10 RX ORDER — LEVOTHYROXINE SODIUM 88 UG/1
TABLET ORAL
Qty: 90 TABLET | Refills: 1 | Status: SHIPPED | OUTPATIENT
Start: 2022-10-10

## 2022-10-10 NOTE — TELEPHONE ENCOUNTER
----- Message from Julieann Bumpers, PA-C sent at 10/10/2022  1:15 PM EDT -----  Please call the patient regarding her abnormal result  TSH is low and free T4 is normal  If currently taking 88 mcg 1 tablet daily, then decrease dose by taking 1 tablet Mon-Sat and 1/2 tablet on Sunday  She has repeat TFTs to do prior to next visit which should complete about a week prior to her visit   Vitamin D is low at 27  If she is taking vitamin D3 5000 Iu daily and missing doses, then she should continue same dose and take without missing doses   If she has not been missing doses then increase to 6000 IU daily

## 2022-10-20 ENCOUNTER — OFFICE VISIT (OUTPATIENT)
Dept: CARDIOLOGY CLINIC | Facility: MEDICAL CENTER | Age: 62
End: 2022-10-20
Payer: COMMERCIAL

## 2022-10-20 VITALS
SYSTOLIC BLOOD PRESSURE: 130 MMHG | BODY MASS INDEX: 19.46 KG/M2 | HEART RATE: 70 BPM | WEIGHT: 124 LBS | DIASTOLIC BLOOD PRESSURE: 80 MMHG | HEIGHT: 67 IN | OXYGEN SATURATION: 97 %

## 2022-10-20 DIAGNOSIS — I71.20 THORACIC AORTIC ANEURYSM WITHOUT RUPTURE: ICD-10-CM

## 2022-10-20 DIAGNOSIS — Z95.828 S/P INSERTION OF ILIAC ARTERY STENT: ICD-10-CM

## 2022-10-20 DIAGNOSIS — E78.00 PURE HYPERCHOLESTEROLEMIA: Primary | ICD-10-CM

## 2022-10-20 DIAGNOSIS — R06.02 SHORTNESS OF BREATH: ICD-10-CM

## 2022-10-20 PROCEDURE — 99204 OFFICE O/P NEW MOD 45 MIN: CPT | Performed by: INTERNAL MEDICINE

## 2022-10-20 NOTE — PROGRESS NOTES
Cardiology Follow Up    Roney Vasquez  1960  [id number]  [address]  [phone]    1  Pure hypercholesterolemia     2  Thoracic aortic aneurysm without rupture  Ambulatory referral to Cardiology   3  Shortness of breath  Ambulatory referral to Cardiology    Echo complete w/ contrast if indicated    Echo stress test, exercise    NT-BNP PRO   4  S/P insertion of iliac artery stent         Discussion: Given her dyslipidemia and extensive aortic and major branch vessel arterial disease, dyspnea with exertion raises a concern for the presence of coronary disease  An echocardiogram, stress echo, NT-proBNP and lipid panel were ordered  If lipids are not significantly lower than they were in 10/21, aggressive augmentation of statin therapy and possibly addition of ezetimibe are warranted  I would like to see her in 2 months  Cardiovascular History:  Ms Dorcas Vargas has significant disease involving the aorta and its major branch vessels  The etiology of this has not been well characterized  She does have significant dyslipidemia, but has never smoked and does not have diabetes  On CT scan in 9/22, the ascending aorta was 4 0 cm, descending thoracic aorta 4 1 cm, and proximal abdominal aorta 3 6 cm  She has undergone stent implantation in the right iliac artery, in a renal artery, and in the celiac artery  These have been stable and are being followed by yearly CT scans  In addition, she reports long-standing dyspnea with exertion  She gets quite short of breath climbing a single flight of stairs  This has been attributed to COPD, although she has never smoked  At the time of her initial visit, and echocardiogram, stress echo, NT-proBNP and lipid panel were ordered  She does have significant dyslipidemia   A lipid panel in 10/21 disclosed total cholesterol 258, , HDL 70, and triglycerides 56  She smoked very briefly many years ago  She does not have diabetes  She does not have hypertension  There is no history of aortic disease in 1st relatives, although a maternal uncle  due to an aortic aneurysm       Patient Active Problem List   Diagnosis   • Anemia   • Thoracic aortic aneurysm without rupture   • Celiac disease   • Iron deficiency anemia   • Hypothyroidism   • Hyperlipidemia   • Age-related osteoporosis without current pathological fracture   • Vitamin D deficiency   • S/P insertion of iliac artery stent   • Urinary symptom or sign   • Left lower quadrant abdominal pain   • Pain in left lumbar region of back     Past Medical History:   Diagnosis Date   • Anemia 2018   • Celiac disease    • Chiari I malformation ([facility] [address] )    • Hyperlipemia    • Nonmelanoma skin cancer     last assessed 2015   • Squamous cell cancer of skin of ala nas2012   • Thoracic aortic aneurysm without rupture 2018   • Thyroid disorder      Social History     Socioeconomic History   • Marital status: /Civil Union     Spouse name: Not on file   • Number of children: Not on file   • Years of education: Not on file   • Highest education level: Not on file   Occupational History   • Occupation: CNA   • Occupation: med     Tobacco Use   • Smoking status: Former Smoker   • Smokeless tobacco: Never Used   Substance and Sexual Activity   • Alcohol use: Yes     Comment: 1 drink per month max   • Drug use: No   • Sexual activity: Not on file   Other Topics Concern   • Not on file   Social History Narrative    Caffeine use noted      Social Determinants of Health     Financial Resource Strain: Not on file   Food Insecurity: Not on file   Transportation Needs: Not on file   Physical Activity: Not on file   Stress: Not on file   Social Connections: Not on file   Intimate Partner Violence: Not on file   Housing Stability: Not on file      Family History   Problem Relation Age of Onset • Arthritis Mother    • COPD Mother    • Rheum arthritis Mother    • COPD Father    • Rheumatic fever Father    • Rheum arthritis Father    • Hypertension Father    • Lung cancer Father    • Crohn's disease Family    • Psoriasis Family    • Lupus Family    • Ulcerative colitis Family    • Breast cancer Cousin         over 48   • No Known Problems Sister    • No Known Problems Daughter    • No Known Problems Maternal Grandmother    • No Known Problems Maternal Grandfather    • No Known Problems Paternal Grandmother    • No Known Problems Paternal Grandfather    • No Known Problems Son    • No Known Problems Brother    • No Known Problems Brother    • No Known Problems Brother    • No Known Problems Brother    • Cancer Paternal Aunt    • Cancer Paternal Uncle      Past Surgical History:   Procedure Laterality Date   • CHOLECYSTECTOMY     • COLECTOMY ZEB     • COLONOSCOPY      resolved 2010   • CORONARY ANGIOPLASTY WITH STENT PLACEMENT      celiac artery stent   • HYSTERECTOMY  2001   • LAPAROSCOPY      large intestine excision    • OOPHORECTOMY Bilateral 2001   • TRANSLUMINAL ANGIOPLASTY      intraoperative, renal artery        Current Outpatient Medications:   •  acetaminophen (TYLENOL) 500 mg tablet, Take 2 tablets by mouth daily as needed, Disp: , Rfl:   •  albuterol (PROVENTIL HFA,VENTOLIN HFA) 90 mcg/act inhaler, Inhale 2 puffs as needed for wheezing, Disp: 18 g, Rfl: 1  •  Cholecalciferol (Vitamin D) 125 MCG (5000 UT) CAPS, Take by mouth, Disp: , Rfl:   •  escitalopram (LEXAPRO) 10 mg tablet, TAKE 1 TABLET BY MOUTH EVERY DAY, Disp: 90 tablet, Rfl: 0  •  levothyroxine (Euthyrox) 88 mcg tablet, Take 1 tablet Monday-Sat   And half a tablet on Sunday, Disp: 90 tablet, Rfl: 1  •  Multiple Vitamins-Minerals (MULTIVITAMIN ADULT PO), Take 1 tablet by mouth daily, Disp: , Rfl:   •  rosuvastatin (CRESTOR) 5 mg tablet, Take 1 tablet (5 mg total) by mouth daily, Disp: 90 tablet, Rfl: 0  •  alendronate (FOSAMAX) 70 mg tablet, Take 1 tablet (70 mg total) by mouth every 7 days, Disp: 12 tablet, Rfl: 1  •  neomycin-polymyxin-dexamethasone (MAXITROL) ophthalmic suspension, ONE DROP IN THE SURGICAL EYE 4 TIMES A DAY STARTING 1 DAY PRIOR TO SURGERY (Patient not taking: Reported on 10/20/2022), Disp: , Rfl:   Allergies   Allergen Reactions   • Morphine And Related Headache     Category: Adverse Reaction;        Labs:  Orders Only on 10/08/2022   Component Date Value   • Glucose, Random 10/08/2022 89    • BUN 10/08/2022 26 (A)   • Creatinine 10/08/2022 0 48 (A)   • eGFR 10/08/2022 106    • SL AMB BUN/CREATININE RA* 10/08/2022 54 (A)   • Sodium 10/08/2022 142    • Potassium 10/08/2022 4 5    • Chloride 10/08/2022 107    • CO2 10/08/2022 29    • Calcium 10/08/2022 9 2    • Free t4 10/08/2022 1 4    • TSH 10/08/2022 0 25 (A)   • Vitamin D, 25-Hydroxy, S* 10/08/2022 27 (A)   Orders Only on 04/23/2022   Component Date Value   • Glucose, Random 04/23/2022 93    • BUN 04/23/2022 26 (A)   • Creatinine 04/23/2022 0 55    • eGFR Non African American 04/23/2022 100    • eGFR  04/23/2022 116    • SL AMB BUN/CREATININE RA* 04/23/2022 47 (A)   • Sodium 04/23/2022 140    • Potassium 04/23/2022 4 6    • Chloride 04/23/2022 106    • CO2 04/23/2022 30    • Calcium 04/23/2022 9 3    • Free t4 04/23/2022 1 2    • TSH 04/23/2022 5 05 (A)   • Vitamin D, 25-Hydroxy, S* 04/23/2022 30      Imaging: No results found  Review of Systems:  Review of Systems   Constitutional: Positive for malaise/fatigue  HENT: Negative  Eyes: Negative  Cardiovascular: Negative  Respiratory: Positive for shortness of breath  Endocrine: Negative  Hematologic/Lymphatic: Negative  Skin: Negative  Musculoskeletal: Negative  Gastrointestinal: Negative  Genitourinary: Negative  Neurological: Negative  Psychiatric/Behavioral: Negative  Allergic/Immunologic: Negative  All other systems reviewed and are negative        Vitals: 10/20/22 1457   BP: 130/80   Pulse: 70   SpO2: 97%     Weight (last 2 days)     Date/Time Weight    10/20/22 1457 56 2 (124)          Physical Exam:  Physical Exam  Vitals reviewed  Constitutional:       General: She is not in acute distress  Appearance: She is well-developed  She is not diaphoretic  HENT:      Head: Normocephalic and atraumatic  Eyes:      General: No scleral icterus  Conjunctiva/sclera: Conjunctivae normal    Neck:      Vascular: No JVD  Trachea: No tracheal deviation  Cardiovascular:      Rate and Rhythm: Normal rate and regular rhythm  Pulses: Intact distal pulses  Heart sounds: Normal heart sounds  No murmur heard  No friction rub  No gallop  Pulmonary:      Effort: Pulmonary effort is normal  No respiratory distress  Breath sounds: Normal breath sounds  No stridor  No wheezing or rales  Chest:      Chest wall: No tenderness  Abdominal:      General: Bowel sounds are normal  There is no distension  Palpations: Abdomen is soft  Tenderness: There is no abdominal tenderness  Musculoskeletal:         General: No tenderness  Normal range of motion  Cervical back: Normal range of motion and neck supple  Skin:     General: Skin is warm and dry  Findings: No erythema  Neurological:      Mental Status: She is alert and oriented to person, place, and time  Cranial Nerves: No cranial nerve deficit  Coordination: Coordination normal    Psychiatric:         Behavior: Behavior normal          Thought Content:  Thought content normal          Judgment: Judgment normal          Gita Clement

## 2022-10-28 DIAGNOSIS — E78.01 FAMILIAL HYPERCHOLESTEROLEMIA: ICD-10-CM

## 2022-10-28 RX ORDER — ROSUVASTATIN CALCIUM 5 MG/1
5 TABLET, COATED ORAL DAILY
Qty: 90 TABLET | Refills: 0 | Status: SHIPPED | OUTPATIENT
Start: 2022-10-28

## 2022-11-14 ENCOUNTER — HOSPITAL ENCOUNTER (OUTPATIENT)
Dept: NON INVASIVE DIAGNOSTICS | Facility: CLINIC | Age: 62
Discharge: HOME | End: 2022-11-14

## 2022-11-14 VITALS
HEART RATE: 61 BPM | HEIGHT: 67 IN | OXYGEN SATURATION: 98 % | SYSTOLIC BLOOD PRESSURE: 128 MMHG | BODY MASS INDEX: 19.46 KG/M2 | WEIGHT: 124 LBS | DIASTOLIC BLOOD PRESSURE: 72 MMHG

## 2022-11-14 VITALS
BODY MASS INDEX: 19.46 KG/M2 | HEIGHT: 67 IN | SYSTOLIC BLOOD PRESSURE: 130 MMHG | HEART RATE: 70 BPM | WEIGHT: 124 LBS | DIASTOLIC BLOOD PRESSURE: 80 MMHG

## 2022-11-14 DIAGNOSIS — R06.02 SHORTNESS OF BREATH: ICD-10-CM

## 2022-11-14 LAB
AORTIC ROOT: 3.5 CM
APICAL FOUR CHAMBER EJECTION FRACTION: 66 %
ASCENDING AORTA: 3.4 CM
BASELINE ST DEPRESSION: 0 MM
E WAVE DECELERATION TIME: 202 MS
FRACTIONAL SHORTENING: 30 (ref 28–44)
INTERVENTRICULAR SEPTUM IN DIASTOLE (PARASTERNAL SHORT AXIS VIEW): 0.9 CM
INTERVENTRICULAR SEPTUM: 0.9 CM (ref 0.6–1.1)
LAAS-AP2: 11.2 CM2
LAAS-AP4: 12.1 CM2
LEFT ATRIUM SIZE: 3 CM
LEFT INTERNAL DIMENSION IN SYSTOLE: 3.1 CM (ref 2.1–4)
LEFT VENTRICULAR INTERNAL DIMENSION IN DIASTOLE: 4.4 CM (ref 3.5–6)
LEFT VENTRICULAR POSTERIOR WALL IN END DIASTOLE: 0.9 CM
LEFT VENTRICULAR STROKE VOLUME: 49 ML
LVSV (TEICH): 49 ML
MAX HR PERCENT: 68 %
MAX HR: 106 BPM
MV E'TISSUE VEL-SEP: 8 CM/S
MV PEAK A VEL: 0.69 M/S
MV PEAK E VEL: 94 CM/S
MV STENOSIS PRESSURE HALF TIME: 59 MS
MV VALVE AREA P 1/2 METHOD: 3.73
RA PRESSURE ESTIMATED: 3 MMHG
RATE PRESSURE PRODUCT: NORMAL
RIGHT ATRIUM AREA SYSTOLE A4C: 11.8 CM2
RIGHT VENTRICLE ID DIMENSION: 3.2 CM
RV PSP: 18 MMHG
SL CV LEFT ATRIUM LENGTH A2C: 3.5 CM
SL CV LV EF: 65
SL CV LV EF: 65
SL CV PED ECHO LEFT VENTRICLE DIASTOLIC VOLUME (MOD BIPLANE) 2D: 87 ML
SL CV PED ECHO LEFT VENTRICLE SYSTOLIC VOLUME (MOD BIPLANE) 2D: 38 ML
SL CV STRESS RECOVERY BP: NORMAL MMHG
SL CV STRESS RECOVERY HR: 68 BPM
SL CV STRESS RECOVERY O2 SAT: 98 %
SL CV STRESS STAGE REACHED: 3
STRESS ANGINA INDEX: 0
STRESS BASELINE BP: NORMAL MMHG
STRESS BASELINE HR: 61 BPM
STRESS DUKE TREADMILL SCORE: 6
STRESS O2 SAT REST: 98 %
STRESS PEAK HR: 105 BPM
STRESS POST ESTIMATED WORKLOAD: 7.5 METS
STRESS POST EXERCISE DUR MIN: 6 MIN
STRESS POST EXERCISE DUR SEC: 11 SEC
STRESS POST O2 SAT PEAK: 99 %
STRESS POST PEAK BP: 160 MMHG
STRESS ST DEPRESSION: 0 MM
TR MAX PG: 15 MMHG
TR PEAK VELOCITY: 2 M/S
TRICUSPID VALVE PEAK REGURGITATION VELOCITY: 1.96 M/S

## 2022-11-17 LAB
CHEST PAIN STATEMENT: NORMAL
MAX DIASTOLIC BP: 82 MMHG
MAX HEART RATE: 111 BPM
MAX PREDICTED HEART RATE: 157 BPM
MAX. SYSTOLIC BP: 169 MMHG
PROTOCOL NAME: NORMAL
TARGET HR FORMULA: NORMAL
TEST INDICATION: NORMAL
TIME IN EXERCISE PHASE: NORMAL

## 2022-11-28 ENCOUNTER — TELEPHONE (OUTPATIENT)
Dept: ENDOCRINOLOGY | Facility: CLINIC | Age: 62
End: 2022-11-28

## 2022-12-04 LAB
T4 FREE SERPL-MCNC: 1.3 NG/DL (ref 0.8–1.8)
TSH SERPL-ACNC: 3.03 MIU/L (ref 0.4–4.5)

## 2022-12-06 ENCOUNTER — OFFICE VISIT (OUTPATIENT)
Dept: ENDOCRINOLOGY | Facility: CLINIC | Age: 62
End: 2022-12-06

## 2022-12-06 VITALS
HEART RATE: 72 BPM | WEIGHT: 127 LBS | BODY MASS INDEX: 19.93 KG/M2 | TEMPERATURE: 98.3 F | SYSTOLIC BLOOD PRESSURE: 110 MMHG | HEIGHT: 67 IN | DIASTOLIC BLOOD PRESSURE: 80 MMHG

## 2022-12-06 DIAGNOSIS — E55.9 VITAMIN D DEFICIENCY: ICD-10-CM

## 2022-12-06 DIAGNOSIS — E03.9 ACQUIRED HYPOTHYROIDISM: Primary | ICD-10-CM

## 2022-12-06 DIAGNOSIS — M81.0 AGE-RELATED OSTEOPOROSIS WITHOUT CURRENT PATHOLOGICAL FRACTURE: ICD-10-CM

## 2022-12-06 DIAGNOSIS — K90.0 CELIAC DISEASE: ICD-10-CM

## 2022-12-06 RX ORDER — ALENDRONATE SODIUM 70 MG/1
70 TABLET ORAL
Qty: 12 TABLET | Refills: 1 | Status: SHIPPED | OUTPATIENT
Start: 2022-12-06 | End: 2023-03-06

## 2022-12-06 RX ORDER — LEVOTHYROXINE SODIUM 88 MCG
TABLET ORAL
Qty: 90 TABLET | Refills: 1 | Status: SHIPPED | OUTPATIENT
Start: 2022-12-06

## 2022-12-06 RX ORDER — CHOLECALCIFEROL (VITAMIN D3) 125 MCG
6000 CAPSULE ORAL DAILY
Status: SHIPPED
Start: 2022-12-06

## 2022-12-06 NOTE — PROGRESS NOTES
Patient Progress Note    CC: hypothyroidism, osteoporosis     Referring Provider  No referring provider defined for this encounter  History of Present Illness:     Patient is 60 year old female here for follow-up of hypothyroidism and osteoporosis  She is currently on levothyroxine 88 mcg 1 tablet daily Monday through Saturday and half tablet on Sunday  Patient is taking it 1 hr before breakfast on an empty stomach and at least 4 hrs apart from supplements  Tolerating medication well  No history of external radiation to head/neck/chest   No family history of thyroid cancer  No recent Iodine loading in form of medication, biotin or kelp supplements or radiological diagnostic studies  Most recent TFTs were normal, TSH  3 03 and free T4 1 3    Her DEXA scan in Jan 2018 was suggestive of osteoporosis of the lumbar spine  Multiple repeat DEXA scans have been ordered but not completed  She is currently taking vitamin D3 5000 IU daily and a multivitamin  Fosamax was started in Nov 2021  She was taking Fosamax 70 mg weekly and tolerating it well but medication apparently was not refilled and patient did not receive it  It was resent again at last visit and she states she never received it   No recent falls or fractures         Patient Active Problem List   Diagnosis   • Anemia   • Thoracic aortic aneurysm without rupture   • Celiac disease   • Iron deficiency anemia   • Hypothyroidism   • Hyperlipidemia   • Age-related osteoporosis without current pathological fracture   • Vitamin D deficiency   • S/P insertion of iliac artery stent   • Urinary symptom or sign   • Left lower quadrant abdominal pain   • Pain in left lumbar region of back     Past Medical History:   Diagnosis Date   • Anemia 8/17/2018   • Celiac disease    • Chiari I malformation ([facility] [address] )    • Hyperlipemia    • Nonmelanoma skin cancer     last assessed 02/19/2015   • Squamous cell cancer of skin of ala johnnyi     2012   • Thoracic aortic aneurysm without rupture 8/17/2018   • Thyroid disorder       Past Surgical History:   Procedure Laterality Date   • CHOLECYSTECTOMY     • COLECTOMY ZEB     • COLONOSCOPY      resolved 2010   • CORONARY ANGIOPLASTY WITH STENT PLACEMENT      celiac artery stent   • HYSTERECTOMY  2001   • LAPAROSCOPY      large intestine excision    • OOPHORECTOMY Bilateral 2001   • TRANSLUMINAL ANGIOPLASTY      intraoperative, renal artery       Family History   Problem Relation Age of Onset   • Arthritis Mother    • COPD Mother    • Rheum arthritis Mother    • COPD Father    • Rheumatic fever Father    • Rheum arthritis Father    • Hypertension Father    • Lung cancer Father    • Crohn's disease Family    • Psoriasis Family    • Lupus Family    • Ulcerative colitis Family    • Breast cancer Cousin         over 48   • No Known Problems Sister    • No Known Problems Daughter    • No Known Problems Maternal Grandmother    • No Known Problems Maternal Grandfather    • No Known Problems Paternal Grandmother    • No Known Problems Paternal Grandfather    • No Known Problems Son    • No Known Problems Brother    • No Known Problems Brother    • No Known Problems Brother    • No Known Problems Brother    • Cancer Paternal Aunt    • Cancer Paternal Uncle      Social History     Tobacco Use   • Smoking status: Former   • Smokeless tobacco: Never   Substance Use Topics   • Alcohol use: Yes     Comment: 1 drink per month max     Allergies   Allergen Reactions   • Morphine And Related Headache     Category:  Adverse Reaction;      Current Outpatient Medications   Medication Sig Dispense Refill   • acetaminophen (TYLENOL) 500 mg tablet Take 2 tablets by mouth daily as needed     • albuterol (PROVENTIL HFA,VENTOLIN HFA) 90 mcg/act inhaler Inhale 2 puffs as needed for wheezing 18 g 1   • alendronate (FOSAMAX) 70 mg tablet Take 1 tablet (70 mg total) by mouth every 7 days 12 tablet 1   • Cholecalciferol (Vitamin D) 125 MCG (5000 UT) CAPS Take 6,000 Units by mouth in the morning     • escitalopram (LEXAPRO) 10 mg tablet TAKE 1 TABLET BY MOUTH EVERY DAY 90 tablet 0   • Multiple Vitamins-Minerals (MULTIVITAMIN ADULT PO) Take 1 tablet by mouth daily     • Synthroid 88 MCG tablet Take 1 tablet Mon-Sat and 1/2 tablet on Sunday 90 tablet 1   • neomycin-polymyxin-dexamethasone (MAXITROL) ophthalmic suspension ONE DROP IN THE SURGICAL EYE 4 TIMES A DAY STARTING 1 DAY PRIOR TO SURGERY (Patient not taking: Reported on 10/20/2022)     • rosuvastatin (CRESTOR) 5 mg tablet TAKE 1 TABLET (5 MG TOTAL) BY MOUTH DAILY  (Patient not taking: Reported on 12/6/2022) 90 tablet 0     No current facility-administered medications for this visit  Review of Systems   Constitutional: Negative for activity change, appetite change, fatigue and unexpected weight change  HENT: Negative for trouble swallowing  Eyes: Negative for visual disturbance  Respiratory: Positive for shortness of breath  Seeing cardiology   Cardiovascular: Negative for chest pain and palpitations  Gastrointestinal: Positive for diarrhea (occasional, diet dependent)  Negative for constipation  Endocrine: Positive for cold intolerance  Negative for heat intolerance  Musculoskeletal: Negative  Skin: Negative  Neurological: Negative for tremors  Psychiatric/Behavioral: Negative  Physical Exam:  Body mass index is 19 89 kg/m²  /80   Pulse 72   Temp 98 3 °F (36 8 °C) (Skin)   Ht 5' 7" (1 702 m)   Wt 57 6 kg (127 lb)   BMI 19 89 kg/m²    Wt Readings from Last 3 Encounters:   12/06/22 57 6 kg (127 lb)   11/14/22 56 2 kg (124 lb)   11/14/22 56 2 kg (124 lb)       Physical Exam  Vitals and nursing note reviewed  Constitutional:       Appearance: She is well-developed and well-nourished  HENT:      Head: Normocephalic  Eyes:      General: No scleral icterus  Extraocular Movements: EOM normal       Pupils: Pupils are equal, round, and reactive to light     Neck:      Thyroid: No thyromegaly  Cardiovascular:      Rate and Rhythm: Normal rate and regular rhythm  Pulses:           Radial pulses are 2+ on the right side and 2+ on the left side  Heart sounds: No murmur heard  Pulmonary:      Effort: Pulmonary effort is normal  No respiratory distress  Breath sounds: Normal breath sounds  No wheezing  Musculoskeletal:      Cervical back: Neck supple  Skin:     General: Skin is warm and dry  Neurological:      Mental Status: She is alert  Psychiatric:         Mood and Affect: Mood and affect normal        Patient's shoes and socks were not removed  Labs:   No results found for: HGBA1C    Lab Results   Component Value Date    CHOL 211 (H) 09/09/2017    HDL 70 10/23/2021    TRIG 56 10/23/2021       Lab Results   Component Value Date    CALCIUM 9 2 10/08/2022     09/09/2017    K 4 5 10/08/2022    CO2 29 10/08/2022     10/08/2022    BUN 26 (H) 10/08/2022    CREATININE 0 48 (L) 10/08/2022        eGFR   Date Value Ref Range Status   10/08/2022 106 > OR = 60 mL/min/1 73m2 Final     Comment:     The eGFR is based on the CKD-EPI 2021 equation  To calculate   the new eGFR from a previous Creatinine or Cystatin C  result, go to [email or web address] at  [email or web address]         Lab Results   Component Value Date    ALT 17 05/10/2021    AST 23 05/10/2021    ALKPHOS 56 05/10/2021    BILITOT 0 4 09/09/2017       Lab Results   Component Value Date    [id number] 0 25 (L) 12/16/2017    TSH 3 03 12/03/2022         Plan:    Diagnoses and all orders for this visit:    Acquired hypothyroidism  TFTs were normal, TSH  3 03 and free T4 1 3    Change levothyroxine to Synthroid due to history of celiac disease   She reports fatigue despite normal TFTs, free T4 is high normal   Continue current dose  Monitor labs  -     Synthroid 88 MCG tablet; Take 1 tablet Mon-Sat and 1/2 tablet on Sunday  -     T4, free;  Future  -     TSH, 3rd generation; Future    Age-related osteoporosis without current pathological fracture  Took Fosamax for a short period of time but reports never getting refills despite refills being sent  Previously tolerated it well  Sent Fosamax refill today and advised her to call us if she does not receive it at the pharmacy  Continue vitamin D supplementation  Complete DEXA scan; reordered test  Take precautions to avoid falls  -     alendronate (FOSAMAX) 70 mg tablet; Take 1 tablet (70 mg total) by mouth every 7 days  -     Basic metabolic panel; Future  -     Vitamin D 25 hydroxy; Future  -     DXA bone density spine hip and pelvis; Future    Vitamin D deficiency  Vitamin D low at 32  Increase vitamin D3 to 6000 IU daily  -     Vitamin D 25 hydroxy; Future  -     Cholecalciferol (Vitamin D) 125 MCG (5000 UT) CAPS; Take 6,000 Units by mouth in the morning    Celiac disease  Follow gluten free diet  Change levothyroxine to Synthroid  -     Synthroid 88 MCG tablet; Take 1 tablet Mon-Sat and 1/2 tablet on Sunday          Discussed with the patient and all questions fully answered  She will call me if any problems arise      Counseled patient on diagnostic results, prognosis, risk and benefit of treatment options, instruction for management, importance of treatment compliance, risk  factor reduction and impressions      Tamiko Martin PA-C

## 2022-12-16 ENCOUNTER — OFFICE VISIT (OUTPATIENT)
Dept: CARDIOLOGY CLINIC | Facility: MEDICAL CENTER | Age: 62
End: 2022-12-16

## 2022-12-16 VITALS
DIASTOLIC BLOOD PRESSURE: 70 MMHG | HEIGHT: 67 IN | WEIGHT: 128 LBS | HEART RATE: 77 BPM | SYSTOLIC BLOOD PRESSURE: 140 MMHG | OXYGEN SATURATION: 98 % | BODY MASS INDEX: 20.09 KG/M2

## 2022-12-16 DIAGNOSIS — R06.09 DYSPNEA ON EXERTION: ICD-10-CM

## 2022-12-16 DIAGNOSIS — E78.00 PURE HYPERCHOLESTEROLEMIA: Primary | ICD-10-CM

## 2022-12-16 NOTE — PROGRESS NOTES
Cardiology Follow Up    Abdi Batista  1960  [id number]  [address]  [phone]  [phone]    1  Pure hypercholesterolemia            Discussion: Echo and stress echo were unremarkable  She did not have an NT proBNP or lipid panel  I asked her to have these tests done, and to return for a follow-up visit in 3 months  As noted previously, if she continues to have an elevated LDL, more aggressive lipid-lowering will be indicated  We will also consider a calcium score  Cardiovascular History:   Ms Edna Andino has significant disease involving the aorta and its major branch vessels  The etiology of this has not been well characterized  She does have significant dyslipidemia, but has never smoked and does not have diabetes  On CT scan in 9/22, the ascending aorta was 4 0 cm, descending thoracic aorta 4 1 cm, and proximal abdominal aorta 3 6 cm  These have been stable since 2016  She has undergone stent implantation in the right iliac artery, in a renal artery, and in the celiac artery  These have been stable and are being followed by yearly CT scans  In addition, she reports long-standing dyspnea with exertion  She gets quite short of breath climbing a single flight of stairs  This has been attributed to COPD, although she has never smoked  An echocardiogram in 11/22 disclosed normal left ventricular systolic function with an estimated ejection fraction of 65%  Stress echocardiography was normal at a submaximal heart rate, peaking at 106 bpm after 6 minutes 11 seconds of exercise  There were no echocardiographic or ECG abnormalities  NT-proBNP and lipid panel were ordered, but not obtained  She does have significant dyslipidemia  A lipid panel in 10/21 disclosed total cholesterol 258, , HDL 70, and triglycerides 56  She smoked very briefly many years ago   She does not have diabetes  She does not have hypertension   There is no history of aortic disease in 1st relatives, although a maternal uncle  due to an aortic aneurysm         Patient Active Problem List   Diagnosis   • Anemia   • Thoracic aortic aneurysm without rupture   • Celiac disease   • Iron deficiency anemia   • Hypothyroidism   • Hyperlipidemia   • Age-related osteoporosis without current pathological fracture   • Vitamin D deficiency   • S/P insertion of iliac artery stent   • Urinary symptom or sign   • Left lower quadrant abdominal pain   • Pain in left lumbar region of back     Past Medical History:   Diagnosis Date   • Anemia 2018   • Celiac disease    • Chiari I malformation ([facility] [address] )    • Hyperlipemia    • Nonmelanoma skin cancer     last assessed 2015   • Squamous cell cancer of skin of ala nasi        • Thoracic aortic aneurysm without rupture 2018   • Thyroid disorder      Social History     Socioeconomic History   • Marital status: /Civil Union     Spouse name: Not on file   • Number of children: Not on file   • Years of education: Not on file   • Highest education level: Not on file   Occupational History   • Occupation: CNA   • Occupation: med     Tobacco Use   • Smoking status: Former   • Smokeless tobacco: Never   Substance and Sexual Activity   • Alcohol use: Yes     Comment: 1 drink per month max   • Drug use: No   • Sexual activity: Not on file   Other Topics Concern   • Not on file   Social History Narrative    Caffeine use noted      Social Determinants of Health     Financial Resource Strain: Not on file   Food Insecurity: Not on file   Transportation Needs: Not on file   Physical Activity: Not on file   Stress: Not on file   Social Connections: Not on file   Intimate Partner Violence: Not on file   Housing Stability: Not on file      Family History   Problem Relation Age of Onset   • Arthritis Mother    • COPD Mother    • Rheum arthritis Mother    • COPD Father • Rheumatic fever Father    • Rheum arthritis Father    • Hypertension Father    • Lung cancer Father    • Crohn's disease Family    • Psoriasis Family    • Lupus Family    • Ulcerative colitis Family    • Breast cancer Cousin         over 48   • No Known Problems Sister    • No Known Problems Daughter    • No Known Problems Maternal Grandmother    • No Known Problems Maternal Grandfather    • No Known Problems Paternal Grandmother    • No Known Problems Paternal Grandfather    • No Known Problems Son    • No Known Problems Brother    • No Known Problems Brother    • No Known Problems Brother    • No Known Problems Brother    • Cancer Paternal Aunt    • Cancer Paternal Uncle      Past Surgical History:   Procedure Laterality Date   • CHOLECYSTECTOMY     • COLECTOMY ZEB     • COLONOSCOPY      resolved 2010   • CORONARY ANGIOPLASTY WITH STENT PLACEMENT      celiac artery stent   • HYSTERECTOMY  2001   • LAPAROSCOPY      large intestine excision    • OOPHORECTOMY Bilateral 2001   • TRANSLUMINAL ANGIOPLASTY      intraoperative, renal artery        Current Outpatient Medications:   •  acetaminophen (TYLENOL) 500 mg tablet, Take 2 tablets by mouth daily as needed, Disp: , Rfl:   •  albuterol (PROVENTIL HFA,VENTOLIN HFA) 90 mcg/act inhaler, Inhale 2 puffs as needed for wheezing, Disp: 18 g, Rfl: 1  •  alendronate (FOSAMAX) 70 mg tablet, Take 1 tablet (70 mg total) by mouth every 7 days, Disp: 12 tablet, Rfl: 1  •  Cholecalciferol (Vitamin D) 125 MCG (5000 UT) CAPS, Take 6,000 Units by mouth in the morning, Disp: , Rfl:   •  escitalopram (LEXAPRO) 10 mg tablet, TAKE 1 TABLET BY MOUTH EVERY DAY, Disp: 90 tablet, Rfl: 0  •  Multiple Vitamins-Minerals (MULTIVITAMIN ADULT PO), Take 1 tablet by mouth daily, Disp: , Rfl:   •  rosuvastatin (CRESTOR) 5 mg tablet, TAKE 1 TABLET (5 MG TOTAL) BY MOUTH DAILY  , Disp: 90 tablet, Rfl: 0  •  Synthroid 88 MCG tablet, Take 1 tablet Mon-Sat and 1/2 tablet on Sunday, Disp: 90 tablet, Rfl: 1  •  neomycin-polymyxin-dexamethasone (MAXITROL) ophthalmic suspension, ONE DROP IN THE SURGICAL EYE 4 TIMES A DAY STARTING 1 DAY PRIOR TO SURGERY (Patient not taking: Reported on 10/20/2022), Disp: , Rfl:   Allergies   Allergen Reactions   • Morphine And Related Headache     Category: Adverse Reaction;        Labs:  Orders Only on 12/03/2022   Component Date Value   • Free t4 12/03/2022 1 3    • TSH 12/03/2022 3 03    Hospital Outpatient Visit on 11/14/2022   Component Date Value   • Baseline HR 11/14/2022 61    • Baseline BP 11/14/2022 128/72    • O2 sat rest 11/14/2022 98    • Stress peak HR 11/14/2022 105    • Post peak BP 11/14/2022 160    • Rate Pressure Product 11/14/2022 16,800 0    • O2 sat peak 11/14/2022 99    • Recovery HR 11/14/2022 68    • Recovery BP 11/14/2022 122/78    • O2 sat recovery 11/14/2022 98    • Max HR 11/14/2022 106    • Max HR Percent 11/14/2022 68    • Exercise duration (min) 11/14/2022 6    • Exercise duration (sec) 11/14/2022 11    • Estimated workload 11/14/2022 7 5    • Angina Index 11/14/2022 0    • Stress Stage Reached 11/14/2022 3 0    • Hung Treadmill Score 11/14/2022 6    • LV EF 11/14/2022 65    • Base ST Depresion (mm) 11/14/2022 0    • ST Depression (mm) 11/14/2022 0    • Protocol Name 11/14/2022 RAND    • Time In Exercise Phase 11/14/2022 00:06:10    • MAX   SYSTOLIC BP 08/82/6880 024    • Max Diastolic Bp 64/12/6616 82    • Max Heart Rate 11/14/2022 111    • Max Predicted Heart Rate 11/14/2022 157    • Reason for Termination 11/14/2022                      Value:Fatigue  Patient Unable to Walk on Treadmill     • Test Indication 11/14/2022 Shortness of breath    • Target Hr Formular 11/14/2022 (220 - Age)*100%    • Chest Pain Statement 11/14/2022 none    Hospital Outpatient Visit on 11/14/2022   Component Date Value   • A4C EF 11/14/2022 66    • LVIDd 11/14/2022 4 40    • LVIDS 11/14/2022 3 10    • IVSd 11/14/2022 0 90    • LVPWd 11/14/2022 0 90    • FS 11/14/2022 30    • MV E' Tissue Velocity Se* 11/14/2022 8    • E wave deceleration time 11/14/2022 202    • MV Peak E Benigno 11/14/2022 94    • MV Peak A Benigno 11/14/2022 0 69    • RVID d 11/14/2022 3 2    • LA size 11/14/2022 3    • LA length (A2C) 11/14/2022 3 50    • RAA A4C 11/14/2022 11 8    • MV stenosis pressure 1/2* 11/14/2022 59    • MV valve area p 1/2 meth* 11/14/2022 3 73    • TR Peak Benigno 11/14/2022 2 0    • Triscuspid Valve Regurgi* 11/14/2022 15 0    • Ao root 11/14/2022 3 50    • Asc Ao 11/14/2022 3 4    • Tricuspid valve peak reg* 11/14/2022 1 96    • Left ventricular stroke * 11/14/2022 49 00    • IVS 11/14/2022 0 9    • LEFT VENTRICLE SYSTOLIC * 27/20/5854 38    • LV DIASTOLIC VOLUME (MOD* 74/06/6107 87    • Left Atrium Area-systoli* 11/14/2022 12 1    • Left Atrium Area-systoli* 11/14/2022 11 2    • LVSV, 2D 11/14/2022 49    • LV EF 11/14/2022 65    • Est  RA pres 11/14/2022 3 0    • Right Ventricular Peak S* 11/14/2022 18 00    Orders Only on 10/08/2022   Component Date Value   • Glucose, Random 10/08/2022 89    • BUN 10/08/2022 26 (H)    • Creatinine 10/08/2022 0 48 (L)    • eGFR 10/08/2022 106    • SL AMB BUN/CREATININE RA* 10/08/2022 54 (H)    • Sodium 10/08/2022 142    • Potassium 10/08/2022 4 5    • Chloride 10/08/2022 107    • CO2 10/08/2022 29    • Calcium 10/08/2022 9 2    • Free t4 10/08/2022 1 4    • TSH 10/08/2022 0 25 (L)    • Vitamin D, 25-Hydroxy, S* 10/08/2022 27 (L)      Imaging: No results found  Review of Systems:  Review of Systems   Constitutional: Negative  HENT: Negative  Eyes: Negative  Cardiovascular: Negative  Respiratory: Negative  Endocrine: Negative  Hematologic/Lymphatic: Negative  Skin: Negative  Musculoskeletal: Negative  Gastrointestinal: Negative  Genitourinary: Negative  Neurological: Negative  Psychiatric/Behavioral: Negative  Allergic/Immunologic: Negative  All other systems reviewed and are negative        Vitals:    12/16/22 1532   BP: 140/70   Pulse: 77   SpO2: 98%     Weight (last 2 days)     Date/Time Weight    12/16/22 1532 58 1 (128)          Physical Exam:  Physical Exam  Vitals reviewed  Constitutional:       General: She is not in acute distress  Appearance: She is well-developed  She is not diaphoretic  HENT:      Head: Normocephalic and atraumatic  Eyes:      General: No scleral icterus  Conjunctiva/sclera: Conjunctivae normal    Neck:      Vascular: No JVD  Trachea: No tracheal deviation  Cardiovascular:      Rate and Rhythm: Normal rate and regular rhythm  Pulses: Intact distal pulses  Heart sounds: Normal heart sounds  No murmur heard  No friction rub  No gallop  Pulmonary:      Effort: Pulmonary effort is normal  No respiratory distress  Breath sounds: Normal breath sounds  No stridor  No wheezing or rales  Chest:      Chest wall: No tenderness  Abdominal:      General: Bowel sounds are normal  There is no distension  Palpations: Abdomen is soft  Tenderness: There is no abdominal tenderness  Musculoskeletal:         General: No tenderness  Normal range of motion  Cervical back: Normal range of motion and neck supple  Skin:     General: Skin is warm and dry  Findings: No erythema  Neurological:      Mental Status: She is alert and oriented to person, place, and time  Cranial Nerves: No cranial nerve deficit  Coordination: Coordination normal    Psychiatric:         Behavior: Behavior normal          Thought Content:  Thought content normal          Judgment: Judgment normal          Richard Ross MD

## 2022-12-22 LAB
CHOLEST SERPL-MCNC: 193 MG/DL
CHOLEST/HDLC SERPL: 2.6 (CALC)
HDLC SERPL-MCNC: 74 MG/DL
LDLC SERPL CALC-MCNC: 106 MG/DL (CALC)
NONHDLC SERPL-MCNC: 119 MG/DL (CALC)
NT-PROBNP SERPL-MCNC: 62 PG/ML
TRIGL SERPL-MCNC: 45 MG/DL

## 2022-12-31 DIAGNOSIS — F41.8 DEPRESSION WITH ANXIETY: ICD-10-CM

## 2023-01-03 RX ORDER — ESCITALOPRAM OXALATE 10 MG/1
TABLET ORAL
Qty: 90 TABLET | Refills: 0 | Status: SHIPPED | OUTPATIENT
Start: 2023-01-03

## 2023-01-05 ENCOUNTER — OFFICE VISIT (OUTPATIENT)
Dept: FAMILY MEDICINE CLINIC | Facility: MEDICAL CENTER | Age: 63
End: 2023-01-05

## 2023-01-05 VITALS
HEIGHT: 67 IN | WEIGHT: 127.6 LBS | TEMPERATURE: 97.6 F | OXYGEN SATURATION: 98 % | SYSTOLIC BLOOD PRESSURE: 126 MMHG | HEART RATE: 69 BPM | DIASTOLIC BLOOD PRESSURE: 74 MMHG | RESPIRATION RATE: 16 BRPM | BODY MASS INDEX: 20.03 KG/M2

## 2023-01-05 DIAGNOSIS — F41.8 DEPRESSION WITH ANXIETY: ICD-10-CM

## 2023-01-05 DIAGNOSIS — E03.9 ACQUIRED HYPOTHYROIDISM: Primary | ICD-10-CM

## 2023-01-05 DIAGNOSIS — I71.20 THORACIC AORTIC ANEURYSM WITHOUT RUPTURE, UNSPECIFIED PART: ICD-10-CM

## 2023-01-05 DIAGNOSIS — E78.00 PURE HYPERCHOLESTEROLEMIA: ICD-10-CM

## 2023-01-05 DIAGNOSIS — Z23 ENCOUNTER FOR IMMUNIZATION: ICD-10-CM

## 2023-01-05 DIAGNOSIS — M81.0 AGE-RELATED OSTEOPOROSIS WITHOUT CURRENT PATHOLOGICAL FRACTURE: ICD-10-CM

## 2023-01-05 DIAGNOSIS — Z11.59 NEED FOR HEPATITIS C SCREENING TEST: ICD-10-CM

## 2023-01-05 DIAGNOSIS — E78.01 FAMILIAL HYPERCHOLESTEROLEMIA: ICD-10-CM

## 2023-01-05 DIAGNOSIS — E55.9 VITAMIN D DEFICIENCY: ICD-10-CM

## 2023-01-05 DIAGNOSIS — J44.9 CHRONIC OBSTRUCTIVE PULMONARY DISEASE, UNSPECIFIED COPD TYPE (HCC): ICD-10-CM

## 2023-01-05 DIAGNOSIS — Z11.4 SCREENING FOR HIV (HUMAN IMMUNODEFICIENCY VIRUS): ICD-10-CM

## 2023-01-05 DIAGNOSIS — Z12.31 SCREENING MAMMOGRAM FOR BREAST CANCER: ICD-10-CM

## 2023-01-05 PROBLEM — J45.909 ASTHMA: Status: ACTIVE | Noted: 2020-04-18

## 2023-01-05 RX ORDER — ROSUVASTATIN CALCIUM 5 MG/1
5 TABLET, COATED ORAL DAILY
Qty: 90 TABLET | Refills: 1 | Status: SHIPPED | OUTPATIENT
Start: 2023-01-05

## 2023-01-05 RX ORDER — LEVOTHYROXINE SODIUM 88 UG/1
CAPSULE ORAL
COMMUNITY

## 2023-01-05 NOTE — PROGRESS NOTES
Assessment/Plan:    DEXA scan scheduled for next month  Mammogram due, order placed  Colonoscopy up-to-date  Blood work ordered, to be done in addition to pending labs ordered by endocrinology  PCV 20 and Tdap vaccines updated today  Influenza vaccine declined  Refill sent to pharmacy for rosuvastatin  Continue regular follow ups with Cardiology and Endocrinology  Follow-up in 6 months for annual physical or sooner if needed  1  Acquired hypothyroidism  Currently on levothyroxine  Follows with endocrinology  2  Pure hypercholesterolemia  Continue rosuvastatin  Check hepatic function panel with additional labs including lipid panel ordered by endocrinology   - Hepatic function panel; Future    3  Depression with anxiety  Appears to be stable  Continue escitalopram     4  Vitamin D deficiency  Currently on vitamin D supplementation over-the-counter  Follows with endocrinology  5  Age-related osteoporosis without current pathological fracture  Currently on Fosamax  DEXA scan scheduled for next month  Follows with endocrinology  6  Familial hypercholesterolemia  Refill sent to pharmacy  Tolerating medication well  - rosuvastatin (CRESTOR) 5 mg tablet; Take 1 tablet (5 mg total) by mouth daily  Dispense: 90 tablet; Refill: 1    7  Screening mammogram for breast cancer  - Mammo screening bilateral w 3d & cad; Future    8  Chronic obstructive pulmonary disease, unspecified COPD type ([facility] [address] )  Appears to be stable  No current use of inhaler  No prior history of smoking  9  Thoracic aortic aneurysm without rupture, unspecified part  Stable since 2016  Follows with cardiology  10  Encounter for immunization  Tdap and PCV 20 vaccines updated today, tolerated well  - TDAP VACCINE GREATER THAN OR EQUAL TO 8YO IM  - Pneumococcal Conjugate Vaccine 20-valent (Pcv20)    11  Screening for HIV (human immunodeficiency virus)  Patient agreeable    - HIV 1/2 AG/AB w Reflex [facility] for 2 yr old and above; Future    12  Need for hepatitis C screening test  Patient agreeable  - Hepatitis C Antibody ([facility]); Future      Subjective:      Patient ID: Bob Hines is a 60 y o  female  58-year-old pleasant female presents for follow-up  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  She really enjoys her job and likes to stay active  She has hypothyroidism  Currently on Levothyroxine  Follows with endocrinology  She has hyperlipidemia  Currently on rosuvastatin  She has depression and anxiety  Currently on escitalopram  She tells me her son is an addict and recently relapsed which has been difficult for her  She would like to continue with dose of escitalopram and does not feel the need to increase or add medication at this time as she is managing  She has asthma/copd  Was previously using albuterol inhaler as needed which she no longer uses because it causes lightheadedness  She tells me Dr Damian Duran with Cardiology advised her to stop using it  She has never been a smoker  She follows with Cardiology for thoracic aortic aneurysm which has been stable and unchanged since 2016  She has osteoporosis  Currently on Fosamax  Follows with endocrinology  She has vitamin D deficiency  Follows with endocrinology  She is agreeable to Tdap and PCV 20 vaccines  Declines influenza vaccine  Agreeable to mammogram       The following portions of the patient's history were reviewed and updated as appropriate: current medications, past family history, past medical history, past social history, past surgical history and problem list     Review of Systems   Constitutional: Negative  HENT: Negative  Eyes: Negative  Respiratory: Negative  Cardiovascular: Negative  Gastrointestinal: Negative  Endocrine: Negative  Genitourinary: Negative  Musculoskeletal: Negative  Skin: Negative  Allergic/Immunologic: Negative  Neurological: Negative  Hematological: Negative  Psychiatric/Behavioral: Negative  Objective:      /74 (BP Location: Left arm, Patient Position: Sitting, Cuff Size: Adult)   Pulse 69   Temp 97 6 °F (36 4 °C)   Resp 16   Ht 5' 7" (1 702 m)   Wt 57 9 kg (127 lb 9 6 oz)   SpO2 98%   BMI 19 98 kg/m²          Physical Exam  Vitals and nursing note reviewed  Constitutional:       General: She is not in acute distress  Appearance: Normal appearance  She is normal weight  She is not ill-appearing  HENT:      Head: Normocephalic and atraumatic  Nose: Nose normal    Eyes:      Conjunctiva/sclera: Conjunctivae normal       Pupils: Pupils are equal, round, and reactive to light  Cardiovascular:      Rate and Rhythm: Normal rate and regular rhythm  Pulses: Normal pulses  Heart sounds: Normal heart sounds  Pulmonary:      Effort: Pulmonary effort is normal  No respiratory distress  Breath sounds: Normal breath sounds  No stridor  No wheezing, rhonchi or rales  Abdominal:      General: Abdomen is flat  Bowel sounds are normal       Palpations: Abdomen is soft  Tenderness: There is no abdominal tenderness  Musculoskeletal:         General: Normal range of motion  Cervical back: Normal range of motion and neck supple  Right lower leg: No edema  Left lower leg: No edema  Skin:     General: Skin is warm and dry  Neurological:      General: No focal deficit present  Mental Status: She is alert and oriented to person, place, and time  Mental status is at baseline  Psychiatric:         Mood and Affect: Mood normal          Behavior: Behavior normal          Thought Content:  Thought content normal                     Lorenza Mendez

## 2023-02-15 ENCOUNTER — HOSPITAL ENCOUNTER (OUTPATIENT)
Dept: RADIOLOGY | Facility: MEDICAL CENTER | Age: 63
Discharge: HOME | End: 2023-02-15

## 2023-02-15 DIAGNOSIS — Z13.820 SCREENING FOR OSTEOPOROSIS: ICD-10-CM

## 2023-02-15 DIAGNOSIS — M81.0 AGE-RELATED OSTEOPOROSIS WITHOUT CURRENT PATHOLOGICAL FRACTURE: ICD-10-CM

## 2023-02-16 ENCOUNTER — TELEPHONE (OUTPATIENT)
Dept: ENDOCRINOLOGY | Facility: CLINIC | Age: 63
End: 2023-02-16

## 2023-02-16 NOTE — TELEPHONE ENCOUNTER
----- Message from Fidel Moore PA-C sent at 2/16/2023  2:34 PM EST -----  Please call the patient regarding her abnormal result    DEXA scan continues to show osteoporosis  Continue current treatment

## 2023-03-02 ENCOUNTER — OFFICE VISIT (OUTPATIENT)
Dept: CARDIOLOGY CLINIC | Facility: MEDICAL CENTER | Age: 63
End: 2023-03-02

## 2023-03-02 VITALS
SYSTOLIC BLOOD PRESSURE: 130 MMHG | DIASTOLIC BLOOD PRESSURE: 80 MMHG | WEIGHT: 126 LBS | BODY MASS INDEX: 19.78 KG/M2 | OXYGEN SATURATION: 97 % | HEART RATE: 83 BPM | HEIGHT: 67 IN

## 2023-03-02 DIAGNOSIS — J44.9 CHRONIC OBSTRUCTIVE PULMONARY DISEASE, UNSPECIFIED COPD TYPE (HCC): ICD-10-CM

## 2023-03-02 DIAGNOSIS — E78.00 PURE HYPERCHOLESTEROLEMIA: Primary | ICD-10-CM

## 2023-03-02 RX ORDER — ROSUVASTATIN CALCIUM 10 MG/1
10 TABLET, COATED ORAL DAILY
Qty: 90 TABLET | Refills: 3 | Status: SHIPPED | OUTPATIENT
Start: 2023-03-02

## 2023-03-02 NOTE — PROGRESS NOTES
Cardiology Follow Up    [facility]  1960  [id number]  [address]  [phone]  [phone]    1  Pure hypercholesterolemia        2  Chronic obstructive pulmonary disease, unspecified COPD type ([address] )            Discussion: She has been stable  NT-proBNP in 12/22 was 61, making a cardiac etiology of dyspnea very unlikely  LDL on low dose rosuvastatin was 106  This is a significant improvement  We will increase her rosuvastatin dose to 10 mg/day  She will return in 6 months with a lipid panel before the visit  Cardiovascular History:  Ms Maci Fairchild has significant disease involving the aorta and its major branch vessels  The etiology of this has not been well characterized  She does have significant dyslipidemia, but has never smoked and does not have diabetes  On CT scan in 9/22, the ascending aorta was 4 0 cm, descending thoracic aorta 4 1 cm, and proximal abdominal aorta 3 6 cm  These have been stable since 2016  She has undergone stent implantation in the right iliac artery, in a renal artery, and in the celiac artery  These have been stable and are being followed by yearly CT scans, ordered by Dr Miko Gan     In addition, she reports long-standing dyspnea with exertion  She gets quite short of breath climbing a single flight of stairs  This has been attributed to COPD, although she has never smoked  An echocardiogram in 11/22 disclosed normal left ventricular systolic function with an estimated ejection fraction of 65%  Stress echocardiography was normal at a submaximal heart rate, peaking at 106 bpm after 6 minutes 11 seconds of exercise  There were no echocardiographic or ECG abnormalities  NT-proBNP in 12/22 was 62, making a cardiac etiology of dyspnea very unlikely  She does have significant dyslipidemia   A lipid panel in 10/21 disclosed total cholesterol 258, , HDL 70, and triglycerides 56  On 5 mg rosuvastatin, the LDL declined to 106 in   Her dose was increased to 10mg/day in 3/23  She smoked very briefly many years ago  She does not have diabetes  She does not have hypertension   There is no history of aortic disease in 1st relatives, although a maternal uncle  due to an aortic aneurysm      Patient Active Problem List   Diagnosis   • Anemia   • Thoracic aortic aneurysm without rupture   • Celiac disease   • Iron deficiency anemia   • Hypothyroidism   • Hyperlipidemia   • Age-related osteoporosis without current pathological fracture   • Vitamin D deficiency   • S/P insertion of iliac artery stent   • Urinary symptom or sign   • Left lower quadrant abdominal pain   • Pain in left lumbar region of back   • Asthma   • Depression with anxiety   • COPD (chronic obstructive pulmonary disease) ([facility] )     Past Medical History:   Diagnosis Date   • Anemia 2018   • Celiac disease    • Chiari I malformation ([facility] )    • Hyperlipemia    • Nonmelanoma skin cancer     last assessed 2015   • Squamous cell cancer of skin of ala nas2012   • Thoracic aortic aneurysm without rupture 2018   • Thyroid disorder      Social History     Socioeconomic History   • Marital status: /Civil Union     Spouse name: Not on file   • Number of children: Not on file   • Years of education: Not on file   • Highest education level: Not on file   Occupational History   • Occupation: CNA   • Occupation: med     Tobacco Use   • Smoking status: Never   • Smokeless tobacco: Never   Vaping Use   • Vaping Use: Never used   Substance and Sexual Activity   • Alcohol use: Yes     Comment: 1 drink per month max   • Drug use: No   • Sexual activity: Not on file   Other Topics Concern   • Not on file   Social History Narrative    Caffeine use noted      Social Determinants of Health     Financial Resource Strain: Not on file   Food Insecurity: Not on file   Transportation Needs: Not on file   Physical Activity: Not on file   Stress: Not on file   Social Connections: Not on file   Intimate Partner Violence: Not on file   Housing Stability: Not on file      Family History   Problem Relation Age of Onset   • Arthritis Mother    • COPD Mother    • Rheum arthritis Mother    • COPD Father    • Rheumatic fever Father    • Rheum arthritis Father    • Hypertension Father    • Lung cancer Father    • Crohn's disease Family    • Psoriasis Family    • Lupus Family    • Ulcerative colitis Family    • Breast cancer Cousin         over 48   • No Known Problems Sister    • No Known Problems Daughter    • No Known Problems Maternal Grandmother    • No Known Problems Maternal Grandfather    • No Known Problems Paternal Grandmother    • No Known Problems Paternal Grandfather    • No Known Problems Son    • No Known Problems Brother    • No Known Problems Brother    • No Known Problems Brother    • No Known Problems Brother    • Cancer Paternal Aunt    • Cancer Paternal Uncle      Past Surgical History:   Procedure Laterality Date   • CHOLECYSTECTOMY     • COLECTOMY ZEB     • COLONOSCOPY      resolved 2010   • CORONARY ANGIOPLASTY WITH STENT PLACEMENT      celiac artery stent   • HYSTERECTOMY  2001   • LAPAROSCOPY      large intestine excision    • OOPHORECTOMY Bilateral 2001   • TRANSLUMINAL ANGIOPLASTY      intraoperative, renal artery        Current Outpatient Medications:   •  acetaminophen (TYLENOL) 500 mg tablet, Take 2 tablets by mouth daily as needed, Disp: , Rfl:   •  alendronate (FOSAMAX) 70 mg tablet, Take 1 tablet (70 mg total) by mouth every 7 days, Disp: 12 tablet, Rfl: 1  •  Cholecalciferol (Vitamin D) 125 MCG (5000 UT) CAPS, Take 6,000 Units by mouth in the morning, Disp: , Rfl:   •  escitalopram (LEXAPRO) 10 mg tablet, TAKE 1 TABLET BY MOUTH EVERY DAY, Disp: 90 tablet, Rfl: 0  •  Levothyroxine Sodium 88 MCG CAPS, Take by mouth, Disp: , Rfl:   •  Multiple Vitamins-Minerals (MULTIVITAMIN ADULT PO), Take 1 tablet by mouth daily, Disp: , Rfl:   •  rosuvastatin (CRESTOR) 5 mg tablet, Take 1 tablet (5 mg total) by mouth daily, Disp: 90 tablet, Rfl: 1  Allergies   Allergen Reactions   • Morphine And Related Headache     Category: Adverse Reaction;        Labs:  Orders Only on 12/17/2022   Component Date Value   • Total Cholesterol 12/17/2022 193    • HDL 12/17/2022 74    • Triglycerides 12/17/2022 45    • LDL Calculated 12/17/2022 106 (H)    • Chol HDLC Ratio 12/17/2022 2 6    • Non-HDL Cholesterol 12/17/2022 119    • proBNP 12/17/2022 62    Orders Only on 12/03/2022   Component Date Value   • Free t4 12/03/2022 1 3    • TSH 12/03/2022 3 03    Hospital Outpatient Visit on 11/14/2022   Component Date Value   • Baseline HR 11/14/2022 61    • Baseline BP 11/14/2022 128/72    • O2 sat rest 11/14/2022 98    • Stress peak HR 11/14/2022 105    • Post peak BP 11/14/2022 160    • Rate Pressure Product 11/14/2022 16,800 0    • O2 sat peak 11/14/2022 99    • Recovery HR 11/14/2022 68    • Recovery BP 11/14/2022 122/78    • O2 sat recovery 11/14/2022 98    • Max HR 11/14/2022 106    • Max HR Percent 11/14/2022 68    • Exercise duration (min) 11/14/2022 6    • Exercise duration (sec) 11/14/2022 11    • Estimated workload 11/14/2022 7 5    • Angina Index 11/14/2022 0    • Stress Stage Reached 11/14/2022 3 0    • Hung Treadmill Score 11/14/2022 6    • LV EF 11/14/2022 65    • Base ST Depresion (mm) 11/14/2022 0    • ST Depression (mm) 11/14/2022 0    • Protocol Name 11/14/2022 RAND    • Time In Exercise Phase 11/14/2022 00:06:10    • MAX   SYSTOLIC BP 41/72/7004 277    • Max Diastolic Bp 48/66/0386 82    • Max Heart Rate 11/14/2022 111    • Max Predicted Heart Rate 11/14/2022 157    • Reason for Termination 11/14/2022                      Value:Fatigue  Patient Unable to Walk on Treadmill     • Test Indication 11/14/2022 Shortness of breath    • Target Hr Formular 11/14/2022 (220 - Age)*100%    • Chest Pain Statement 11/14/2022 none    Hospital Outpatient Visit on 11/14/2022   Component Date Value   • A4C EF 11/14/2022 66    • LVIDd 11/14/2022 4 40    • LVIDS 11/14/2022 3 10    • IVSd 11/14/2022 0 90    • LVPWd 11/14/2022 0 90    • FS 11/14/2022 30    • MV E' Tissue Velocity Se* 11/14/2022 8    • E wave deceleration time 11/14/2022 202    • MV Peak E Benigno 11/14/2022 94    • MV Peak A Benigno 11/14/2022 0 69    • RVID d 11/14/2022 3 2    • LA size 11/14/2022 3    • LA length (A2C) 11/14/2022 3 50    • RAA A4C 11/14/2022 11 8    • MV stenosis pressure 1/2* 11/14/2022 59    • MV valve area p 1/2 meth* 11/14/2022 3 73    • TR Peak Benigno 11/14/2022 2 0    • Triscuspid Valve Regurgi* 11/14/2022 15 0    • Ao root 11/14/2022 3 50    • Asc Ao 11/14/2022 3 4    • Tricuspid valve peak reg* 11/14/2022 1 96    • Left ventricular stroke * 11/14/2022 49 00    • IVS 11/14/2022 0 9    • LEFT VENTRICLE SYSTOLIC * 99/27/7619 38    • LV DIASTOLIC VOLUME (MOD* 74/48/2466 87    • Left Atrium Area-systoli* 11/14/2022 12 1    • Left Atrium Area-systoli* 11/14/2022 11 2    • LVSV, 2D 11/14/2022 49    • LV EF 11/14/2022 65    • Est  RA pres 11/14/2022 3 0    • Right Ventricular Peak S* 11/14/2022 18 00    Orders Only on 10/08/2022   Component Date Value   • Glucose, Random 10/08/2022 89    • BUN 10/08/2022 26 (H)    • Creatinine 10/08/2022 0 48 (L)    • eGFR 10/08/2022 106    • SL AMB BUN/CREATININE RA* 10/08/2022 54 (H)    • Sodium 10/08/2022 142    • Potassium 10/08/2022 4 5    • Chloride 10/08/2022 107    • CO2 10/08/2022 29    • Calcium 10/08/2022 9 2    • Free t4 10/08/2022 1 4    • TSH 10/08/2022 0 25 (L)    • Vitamin D, 25-Hydroxy, S* 10/08/2022 27 (L)      Imaging: DXA bone density spine hip and pelvis    Result Date: 2/16/2023  Narrative: DXA SCAN CLINICAL HISTORY: 58 years postmenopausal female   OTHER RISK FACTORS:  Prior fracture as a result of minor injury, COPD, SSRI therapy  PHARMACOLOGIC THERAPY FOR OSTEOPOROSIS:  Fosamax   TECHNIQUE: Bone densitometry was performed using a Hologic Horizon A  bone densitometer  Regions of interest appear properly placed  COMPARISON: 1/4/2018  RESULTS: LUMBAR SPINE Level: L2-L4  (L1 vertebra excluded from analysis due to local structural abnormalities or artifact) : BMD:  0 728  gm/cm2 T-score: -3 2 LEFT  TOTAL HIP: BMD:  0 709  gm/cm2 T-score:  -1 9 LEFT  FEMORAL NECK: BMD:  0 615  gm/cm2 T score: -2 1     Impression: 1  Osteoporosis  [Based on the lumbar spine] 2  Since a DXA study from 1/4/2018, there has been: A  STATISTICALLY SIGNIFICANT DECREASE in bone mineral density of  0 042 g/cm2 (5 4%) in the lumbar spine  3   The 10 year risk of hip fracture is 2 5% with the 10 year risk of major osteoporotic fracture being 15% as calculated by the [facility] fracture risk assessment tool (FRAX, which is based on data generated by the [facility]  for Metabolic Bone Diseases)  4   The current NOF guidelines recommend treating patients with a T-score of -2 5 or less in the lumbar spine or hips, or in post-menopausal women and men over the age of 48 with low bone mass (osteopenia) and a FRAX 10 year risk score of >3% for hip fracture and/or >20% for major osteoporotic fracture  5   The NOF recommends follow-up DXA in 1-2 years after initiating therapy for osteoporosis and every 2 years thereafter  More frequent evaluation is appropriate for patients with conditions associated with rapid bone loss, such as glucocorticoid therapy  The interval between DXA screenings may be longer for individuals without major risk factors and initial T-score in the normal or upper low bone mass range  The FRAX algorithm has certain limitations: -FRAX has not been validated in patients currently or previously treated with pharmacotherapy for osteoporosis  In such patients, clinical judgment must be exercised in interpreting FRAX scores    -Prior hip, vertebral and humeral fragility fractures appear to confer greater risk of subsequent fracture than fractures at other sites (this is especially true for individuals with severe vertebral fractures), but quantification of this incremental risk is not possible with FRAX  -FRAX underestimates fracture risk in patients with history of multiple fragility fractures  -FRAX may underestimate fracture risk in patients with history of frequent falls  -It is not appropriate to use FRAX to monitor treatment response  WHO CLASSIFICATION: Normal (a T-score of -1 0 or higher) Low bone mineral density (a T-score of less than -1 0 but higher than -2 5) Osteoporosis (a T-score of -2 5 or less) Severe osteoporosis (a T-score of -2 5 or less with a fragility fracture) LEAST SIGNIFICANT CHANGE (AT 95% C  I): Lumbar spine: 0 022; 2 2% Total hip: 0 017 g/cm2; 1 9% Forearm: 0 032 g/cm2; 5 0% Workstation performed: [id number]       Review of Systems:  Review of Systems   Constitutional: Negative  HENT: Negative  Eyes: Negative  Cardiovascular: Negative  Respiratory: Negative  Endocrine: Negative  Hematologic/Lymphatic: Negative  Skin: Negative  Musculoskeletal: Negative  Gastrointestinal: Negative  Genitourinary: Negative  Neurological: Negative  Psychiatric/Behavioral: Negative  Allergic/Immunologic: Negative  All other systems reviewed and are negative  Vitals:    03/02/23 1531   BP: 130/80   Pulse: 83   SpO2: 97%     Weight (last 2 days)     Date/Time Weight    03/02/23 1531 57 2 (126)          Physical Exam:  Physical Exam  Vitals reviewed  Constitutional:       General: She is not in acute distress  Appearance: She is well-developed  She is not diaphoretic  HENT:      Head: Normocephalic and atraumatic  Eyes:      General: No scleral icterus  Conjunctiva/sclera: Conjunctivae normal    Neck:      Vascular: No JVD  Trachea: No tracheal deviation  Cardiovascular:      Rate and Rhythm: Normal rate and regular rhythm        Pulses: Intact distal pulses  Heart sounds: Normal heart sounds  No murmur heard  No friction rub  No gallop  Pulmonary:      Effort: Pulmonary effort is normal  No respiratory distress  Breath sounds: Normal breath sounds  No stridor  No wheezing or rales  Chest:      Chest wall: No tenderness  Abdominal:      General: Bowel sounds are normal  There is no distension  Palpations: Abdomen is soft  Tenderness: There is no abdominal tenderness  Musculoskeletal:         General: No tenderness  Normal range of motion  Cervical back: Normal range of motion and neck supple  Skin:     General: Skin is warm and dry  Findings: No erythema  Neurological:      Mental Status: She is alert and oriented to person, place, and time  Cranial Nerves: No cranial nerve deficit  Coordination: Coordination normal    Psychiatric:         Behavior: Behavior normal          Thought Content:  Thought content normal          Judgment: Judgment normal          Columba Bright MD

## 2023-03-13 DIAGNOSIS — E03.9 ACQUIRED HYPOTHYROIDISM: Primary | ICD-10-CM

## 2023-03-13 RX ORDER — LEVOTHYROXINE SODIUM 88 UG/1
CAPSULE ORAL
Qty: 90 CAPSULE | Refills: 1 | Status: SHIPPED | OUTPATIENT
Start: 2023-03-13 | End: 2023-03-17

## 2023-03-17 DIAGNOSIS — E03.9 ACQUIRED HYPOTHYROIDISM: Primary | ICD-10-CM

## 2023-03-17 RX ORDER — LEVOTHYROXINE SODIUM 88 UG/1
88 TABLET ORAL DAILY
Qty: 90 TABLET | Refills: 1 | Status: SHIPPED | OUTPATIENT
Start: 2023-03-17

## 2023-03-17 RX ORDER — LEVOTHYROXINE SODIUM 88 UG/1
88 TABLET ORAL DAILY
COMMUNITY
End: 2023-03-17 | Stop reason: SDUPTHER

## 2023-03-31 DIAGNOSIS — F41.8 DEPRESSION WITH ANXIETY: ICD-10-CM

## 2023-03-31 RX ORDER — ESCITALOPRAM OXALATE 10 MG/1
TABLET ORAL
Qty: 90 TABLET | Refills: 0 | Status: SHIPPED | OUTPATIENT
Start: 2023-03-31

## 2023-05-31 DIAGNOSIS — M81.0 AGE-RELATED OSTEOPOROSIS WITHOUT CURRENT PATHOLOGICAL FRACTURE: ICD-10-CM

## 2023-05-31 NOTE — TELEPHONE ENCOUNTER
Patient called to request her lab orders be sent to her home for her 6/26 appointment  Labs printed and mailed   No further questions or concerns

## 2023-06-01 RX ORDER — ALENDRONATE SODIUM 70 MG/1
70 TABLET ORAL
Qty: 12 TABLET | Refills: 1 | Status: SHIPPED | OUTPATIENT
Start: 2023-06-01 | End: 2023-08-30

## 2023-06-11 LAB
25(OH)D3 SERPL-MCNC: 69 NG/ML (ref 30–100)
BUN SERPL-MCNC: 27 MG/DL (ref 7–25)
BUN/CREAT SERPL: 50 (CALC) (ref 6–22)
CALCIUM SERPL-MCNC: 9.8 MG/DL (ref 8.6–10.4)
CHLORIDE SERPL-SCNC: 108 MMOL/L (ref 98–110)
CO2 SERPL-SCNC: 32 MMOL/L (ref 20–32)
CREAT SERPL-MCNC: 0.54 MG/DL (ref 0.5–1.05)
GFR/BSA.PRED SERPLBLD CYS-BASED-ARV: 103 ML/MIN/1.73M2
GLUCOSE SERPL-MCNC: 96 MG/DL (ref 65–99)
POTASSIUM SERPL-SCNC: 5.2 MMOL/L (ref 3.5–5.3)
SODIUM SERPL-SCNC: 144 MMOL/L (ref 135–146)
T4 FREE SERPL-MCNC: 1.1 NG/DL (ref 0.8–1.8)
TSH SERPL-ACNC: 2.27 MIU/L (ref 0.4–4.5)

## 2023-06-26 ENCOUNTER — OFFICE VISIT (OUTPATIENT)
Dept: ENDOCRINOLOGY | Facility: CLINIC | Age: 63
End: 2023-06-26
Payer: COMMERCIAL

## 2023-06-26 VITALS
WEIGHT: 128.8 LBS | HEIGHT: 67 IN | BODY MASS INDEX: 20.21 KG/M2 | DIASTOLIC BLOOD PRESSURE: 64 MMHG | SYSTOLIC BLOOD PRESSURE: 126 MMHG | OXYGEN SATURATION: 98 % | HEART RATE: 64 BPM

## 2023-06-26 DIAGNOSIS — E55.9 VITAMIN D DEFICIENCY: ICD-10-CM

## 2023-06-26 DIAGNOSIS — M81.0 AGE-RELATED OSTEOPOROSIS WITHOUT CURRENT PATHOLOGICAL FRACTURE: ICD-10-CM

## 2023-06-26 DIAGNOSIS — E03.9 ACQUIRED HYPOTHYROIDISM: ICD-10-CM

## 2023-06-26 DIAGNOSIS — M54.50 LOW BACK PAIN WITHOUT SCIATICA, UNSPECIFIED BACK PAIN LATERALITY, UNSPECIFIED CHRONICITY: Primary | ICD-10-CM

## 2023-06-26 DIAGNOSIS — K90.0 CELIAC DISEASE: ICD-10-CM

## 2023-06-26 PROCEDURE — 99214 OFFICE O/P EST MOD 30 MIN: CPT | Performed by: INTERNAL MEDICINE

## 2023-06-26 NOTE — PROGRESS NOTES
Neema Adams 58 y o  female MRN: [id number]    Encounter: [id number]      Assessment/Plan     Assessment: This is a 58y o -year-old female with hypothyroidism  Plan:    Diagnoses and all orders for this visit:    Low back pain without sciatica, unspecified back pain laterality, unspecified chronicity  Patient is complaining of low back pain considering she has a obtain x-ray spine  -     XR spine entire ap and lateral; Future    Celiac disease  Discussed to follow-gluten-free diet    Acquired hypothyroidism  Lab Results   Component Value Date    [id number] 0 25 (L) 12/16/2017    TSH 2 27 06/10/2023   Continue current dose of levothyroxine  Patient is clinically and biochemically euthyroid    -     T4, free; Future  -     TSH, 3rd generation; Future    Vitamin D deficiency  Recent vitamin D 71 from June 2023  Continue current dose of vitamin D3 supplementation  Order vitamin D level before next appointment  -     Vitamin D 25 hydroxy; Future  -     Basic metabolic panel; Future    Age-related osteoporosis without current pathological fracture  Continue Fosamax therapy once a week  Continue calcium and vitamin D3 supplementation  Discussed importance of fall precautions  -     XR spine entire ap and lateral; Future        CC:     Hypothyroidism     History of Present Illness     HPI:    Neema Adams is 21-year-old woman with medical history of osteoporosis, hypothyroidism, vitamin D deficiency and celiac disease is here for follow-up  Osteoporosis she takes Fosamax 70 mg once a week, denies side effects  She admits taking it regularly,  Was started on Fosamax in November 2021    For vitamin D deficiency, she takes vitamin D3 125 mcg daily,  She takes multivitamin, 1 tablet daily  For hyperlipidemia takes Crestor 10 mg      Last vitamin D is 71  DEXA scan from February 2023 showed, T score -3 2 at lumbar spine, left total hip T score -1 9, left femoral neck T score -2 1, suggestive of osteoporosis  Statistically significant decrease in bone mineral density in lumbar spine,  10-year risk of hip fracture is 2 5% with 10-year risk of major osteoporotic fracture is 15%  For hypothyroidism she takes levothyroxine 88 mcg daily from Monday through Saturday and half tablet on Sunday  She takes it on empty stomach 1 hour before breakfast   Most recent TSH is 2 2 which is normal  Lab Results   Component Value Date    [id number] 0 25 (L) 12/16/2017    TSH 2 27 06/10/2023     For hyperlipidemia, she takes Crestor 10 mg daily        Component      Latest Ref Rng & Units 12/17/2022 6/10/2023   Glucose, Random      65 - 99 mg/dL  96   BUN      7 - 25 mg/dL  27 (H)   Creatinine      0 50 - 1 05 mg/dL  0 54   eGFR      > OR = 60 mL/min/1 73m2  103   SL AMB BUN/CREATININE RATIO      6 - 22 (calc)  50 (H)   Sodium      135 - 146 mmol/L  144   Potassium      3 5 - 5 3 mmol/L  5 2   Chloride      98 - 110 mmol/L  108   CO2      20 - 32 mmol/L  32   Calcium      8 6 - 10 4 mg/dL  9 8   Cholesterol      <200 mg/dL 193    HDL      > OR = 50 mg/dL 74    Triglycerides      <150 mg/dL 45    LDL Calculated      mg/dL (calc) 106 (H)    Chol HDLC Ratio      <5 0 (calc) 2 6    Non-HDL Cholesterol      <130 mg/dL (calc) 119    TSH, POC      0 40 - 4 50 mIU/L  2 27   Free T4      0 8 - 1 8 ng/dL  1 1   proBNP      pg/mL 62    EXTERNAL VITAMIN D,25      30 - 100 ng/mL  69       Review of Systems   Constitutional: Negative for activity change, diaphoresis, fatigue, fever and unexpected weight change  HENT: Negative  Eyes: Negative for visual disturbance  Respiratory: Negative for cough, chest tightness and shortness of breath  Cardiovascular: Negative for chest pain, palpitations and leg swelling  Gastrointestinal: Negative for abdominal pain, blood in stool, constipation, diarrhea, nausea and vomiting  Endocrine: Negative for cold intolerance, heat intolerance, polydipsia, polyphagia and polyuria  Genitourinary: Negative for dysuria, enuresis, frequency and urgency  Musculoskeletal: Negative for arthralgias and myalgias  Skin: Negative for pallor, rash and wound  Allergic/Immunologic: Negative  Neurological: Negative for dizziness, tremors, weakness and numbness  Hematological: Negative  Psychiatric/Behavioral: Negative          Historical Information   Past Medical History:   Diagnosis Date   • Anemia 8/17/2018   • Celiac disease    • Chiari I malformation ([facility] )    • Hyperlipemia    • Nonmelanoma skin cancer     last assessed 02/19/2015   • Squamous cell cancer of skin of ala nasi     2012   • Thoracic aortic aneurysm without rupture ([facility] ) 8/17/2018   • Thyroid disorder      Past Surgical History:   Procedure Laterality Date   • CHOLECYSTECTOMY     • COLECTOMY ZEB     • COLONOSCOPY      resolved 2010   • CORONARY ANGIOPLASTY WITH STENT PLACEMENT      celiac artery stent   • HYSTERECTOMY  2001   • LAPAROSCOPY      large intestine excision    • OOPHORECTOMY Bilateral 2001   • TRANSLUMINAL ANGIOPLASTY      intraoperative, renal artery      Social History   Social History     Substance and Sexual Activity   Alcohol Use Yes    Comment: 1 drink per month max     Social History     Substance and Sexual Activity   Drug Use No     Social History     Tobacco Use   Smoking Status Never   Smokeless Tobacco Never     Family History:   Family History   Problem Relation Age of Onset   • Arthritis Mother    • COPD Mother    • Rheum arthritis Mother    • COPD Father    • Rheumatic fever Father    • Rheum arthritis Father    • Hypertension Father    • Lung cancer Father    • Crohn's disease Family    • Psoriasis Family    • Lupus Family    • Ulcerative colitis Family    • Breast cancer Cousin         over 48   • No Known Problems Sister    • No Known Problems Daughter    • No Known Problems Maternal Grandmother    • No Known Problems Maternal Grandfather    • No Known Problems Paternal Grandmother    • No "Known Problems Paternal Grandfather    • No Known Problems Son    • No Known Problems Brother    • No Known Problems Brother    • No Known Problems Brother    • No Known Problems Brother    • Cancer Paternal Aunt    • Cancer Paternal Uncle        Meds/Allergies   Current Outpatient Medications   Medication Sig Dispense Refill   • acetaminophen (TYLENOL) 500 mg tablet Take 2 tablets by mouth daily as needed     • alendronate (FOSAMAX) 70 mg tablet TAKE 1 TABLET (70 MG TOTAL) BY MOUTH EVERY 7 DAYS 12 tablet 1   • Cholecalciferol (Vitamin D) 125 MCG (5000 UT) CAPS Take 6,000 Units by mouth in the morning     • escitalopram (LEXAPRO) 10 mg tablet TAKE 1 TABLET BY MOUTH EVERY DAY 90 tablet 0   • levothyroxine 88 mcg tablet Take 1 tablet (88 mcg total) by mouth daily (Patient taking differently: Take 88 mcg by mouth daily Monday through Saturday and 1/2 tablet on Sunday) 90 tablet 1   • Multiple Vitamins-Minerals (MULTIVITAMIN ADULT PO) Take 1 tablet by mouth daily     • rosuvastatin (CRESTOR) 10 MG tablet Take 1 tablet (10 mg total) by mouth daily 90 tablet 3     No current facility-administered medications for this visit  Allergies   Allergen Reactions   • Morphine And Related Headache     Category: Adverse Reaction;        Objective   Vitals: Blood pressure 126/64, pulse 64, height 5' 7\" (1 702 m), weight 58 4 kg (128 lb 12 8 oz), SpO2 98 %, not currently breastfeeding  Physical Exam  Vitals reviewed  Constitutional:       General: She is not in acute distress  Appearance: Normal appearance  She is not ill-appearing  HENT:      Head: Normocephalic and atraumatic  Nose: Nose normal    Eyes:      Extraocular Movements: Extraocular movements intact  Conjunctiva/sclera: Conjunctivae normal    Cardiovascular:      Rate and Rhythm: Normal rate and regular rhythm  Pulses: Normal pulses  Heart sounds: Normal heart sounds     Pulmonary:      Effort: Pulmonary effort is normal  No respiratory " "distress  Breath sounds: Normal breath sounds  Musculoskeletal:         General: Normal range of motion  Cervical back: Normal range of motion  Skin:     General: Skin is warm and dry  Findings: No rash  Neurological:      General: No focal deficit present  Mental Status: She is alert and oriented to person, place, and time  Psychiatric:         Mood and Affect: Mood normal          Behavior: Behavior normal          The history was obtained from the review of the chart, patient  Lab Results:   Lab Results   Component Value Date/Time    Free t4 1 1 06/10/2023 08:02 AM    Free t4 1 3 12/03/2022 09:02 AM    Free t4 1 4 10/08/2022 08:56 AM       Imaging Studies:       I have personally reviewed pertinent reports  Portions of the record may have been created with voice recognition software  Occasional wrong word or \"sound a like\" substitutions may have occurred due to the inherent limitations of voice recognition software  Read the chart carefully and recognize, using context, where substitutions have occurred    "

## 2023-06-29 ENCOUNTER — APPOINTMENT (OUTPATIENT)
Dept: RADIOLOGY | Facility: MEDICAL CENTER | Age: 63
End: 2023-06-29
Payer: COMMERCIAL

## 2023-06-29 DIAGNOSIS — M54.50 LOW BACK PAIN WITHOUT SCIATICA, UNSPECIFIED BACK PAIN LATERALITY, UNSPECIFIED CHRONICITY: ICD-10-CM

## 2023-06-29 DIAGNOSIS — M81.0 AGE-RELATED OSTEOPOROSIS WITHOUT CURRENT PATHOLOGICAL FRACTURE: ICD-10-CM

## 2023-06-29 PROCEDURE — 72082 X-RAY EXAM ENTIRE SPI 2/3 VW: CPT

## 2023-07-05 DIAGNOSIS — F41.8 DEPRESSION WITH ANXIETY: ICD-10-CM

## 2023-07-05 RX ORDER — ESCITALOPRAM OXALATE 10 MG/1
TABLET ORAL
Qty: 90 TABLET | Refills: 0 | Status: SHIPPED | OUTPATIENT
Start: 2023-07-05

## 2023-07-14 ENCOUNTER — TELEPHONE (OUTPATIENT)
Dept: ENDOCRINOLOGY | Facility: CLINIC | Age: 63
End: 2023-07-14

## 2023-07-14 NOTE — TELEPHONE ENCOUNTER
Please inform pt that results are not back yet, once it is read we will let her know     Hardik Terrazas

## 2023-07-14 NOTE — TELEPHONE ENCOUNTER
For some reason Radiology hasn't read this yet and she had it done on 6/29. I did call them and they are going to have someone read the result. I let message advising patient.

## 2023-08-05 ENCOUNTER — APPOINTMENT (OUTPATIENT)
Dept: LAB | Facility: MEDICAL CENTER | Age: 63
End: 2023-08-05
Payer: COMMERCIAL

## 2023-08-05 DIAGNOSIS — Z11.59 NEED FOR HEPATITIS C SCREENING TEST: ICD-10-CM

## 2023-08-05 DIAGNOSIS — Z11.4 SCREENING FOR HIV (HUMAN IMMUNODEFICIENCY VIRUS): ICD-10-CM

## 2023-08-05 DIAGNOSIS — E78.00 PURE HYPERCHOLESTEROLEMIA: ICD-10-CM

## 2023-08-05 LAB
ALBUMIN SERPL BCP-MCNC: 3.9 G/DL (ref 3.5–5)
ALP SERPL-CCNC: 57 U/L (ref 46–116)
ALT SERPL W P-5'-P-CCNC: 25 U/L (ref 12–78)
AST SERPL W P-5'-P-CCNC: 21 U/L (ref 5–45)
BILIRUB DIRECT SERPL-MCNC: 0.14 MG/DL (ref 0–0.2)
BILIRUB SERPL-MCNC: 0.49 MG/DL (ref 0.2–1)
PROT SERPL-MCNC: 7.5 G/DL (ref 6.4–8.4)

## 2023-08-05 PROCEDURE — 86803 HEPATITIS C AB TEST: CPT

## 2023-08-05 PROCEDURE — 80076 HEPATIC FUNCTION PANEL: CPT

## 2023-08-05 PROCEDURE — 87389 HIV-1 AG W/HIV-1&-2 AB AG IA: CPT

## 2023-08-05 PROCEDURE — 36415 COLL VENOUS BLD VENIPUNCTURE: CPT

## 2023-08-06 LAB
HCV AB SER QL: NORMAL
HIV 1+2 AB+HIV1 P24 AG SERPL QL IA: NORMAL
HIV 2 AB SERPL QL IA: NORMAL
HIV1 AB SERPL QL IA: NORMAL
HIV1 P24 AG SERPL QL IA: NORMAL

## 2023-08-16 ENCOUNTER — OFFICE VISIT (OUTPATIENT)
Dept: FAMILY MEDICINE CLINIC | Facility: MEDICAL CENTER | Age: 63
End: 2023-08-16
Payer: COMMERCIAL

## 2023-08-16 VITALS
OXYGEN SATURATION: 97 % | WEIGHT: 128.4 LBS | SYSTOLIC BLOOD PRESSURE: 130 MMHG | BODY MASS INDEX: 20.15 KG/M2 | HEART RATE: 74 BPM | TEMPERATURE: 97.9 F | HEIGHT: 67 IN | DIASTOLIC BLOOD PRESSURE: 64 MMHG

## 2023-08-16 DIAGNOSIS — M54.6 ACUTE MIDLINE THORACIC BACK PAIN: Primary | ICD-10-CM

## 2023-08-16 PROCEDURE — 99213 OFFICE O/P EST LOW 20 MIN: CPT

## 2023-08-16 RX ORDER — MELOXICAM 15 MG/1
15 TABLET ORAL DAILY PRN
Qty: 30 TABLET | Refills: 0 | Status: SHIPPED | OUTPATIENT
Start: 2023-08-16

## 2023-08-16 NOTE — PROGRESS NOTES
Assessment/Plan:    Meloxicam once daily. Referral to PT. Follow up in 1 month. Sooner if needed. 1. Acute midline thoracic back pain  Advised patient to take meloxicam once daily for pain and inflammation relief. Avoid additional use of NSAIDs while taking meloxicam.  Referral placed to physical therapy. Return in 1 month for follow-up, call the office sooner if red flag signs or symptoms develop as discussed in office. If pain worsens or fails to improve, patient may need to be referred to comprehensive spine.  - meloxicam (MOBIC) 15 mg tablet; Take 1 tablet (15 mg total) by mouth daily as needed for moderate pain  Dispense: 30 tablet; Refill: 0  - Ambulatory Referral to Physical Therapy; Future      Subjective:      Patient ID: Mayela Ludwig is a 58 y.o. female. 58year old female presents with complaint of thoracic, lumbar back pain x 2 months which is worse with sitting for prolonged periods of times and bending forward. Walking and standing improve the pain. Denies numbness, tingling, weakness, urinary sx, incontinence, radiation of pain. She reports pain is worse in the morning 8/10, she takes Ibuprofen which takes the pain down to 5/10. She reports pain is starting to move into her neck and head causing her headaches. She had a complete spine x-ray done by Endocrinologist on 6/29 which showed osteopenia, no other significant findings. She is on Fosamax. She reports Ibuprofen does give her some relief, not complete relief. She attempted to follow up with her 's Chiropractor Dr. Dandre Godoy however he told her he could not do anything for her due to her multiple thoracic aortic aneurysms in which she follows with Vascular Dr. Leatha Kyle for and has stents placed.       The following portions of the patient's history were reviewed and updated as appropriate: allergies, current medications, past family history, past medical history, past surgical history and problem list.    Review of Systems   Constitutional: Negative. HENT: Negative. Eyes: Negative. Respiratory: Negative. Cardiovascular: Negative. Gastrointestinal: Negative. Endocrine: Negative. Genitourinary: Negative. Musculoskeletal: Positive for back pain (thoracic and lumbar). Skin: Negative. Allergic/Immunologic: Negative. Neurological: Negative. Hematological: Negative. Psychiatric/Behavioral: Negative. Objective:      /64 (BP Location: Left arm, Patient Position: Sitting, Cuff Size: Adult)   Pulse 74   Temp 97.9 °F (36.6 °C)   Ht 5' 7" (1.702 m)   Wt 58.2 kg (128 lb 6.4 oz)   SpO2 97%   BMI 20.11 kg/m²          Physical Exam  Vitals and nursing note reviewed. Constitutional:       General: She is not in acute distress. Appearance: Normal appearance. She is not ill-appearing. HENT:      Head: Normocephalic and atraumatic. Cardiovascular:      Rate and Rhythm: Normal rate. Pulses: Normal pulses. Musculoskeletal:      Cervical back: Normal range of motion and neck supple. No rigidity or tenderness. Thoracic back: Tenderness (midline) present. Lumbar back: Tenderness (midline, left and right sided. left>right) present. Negative right straight leg raise test and negative left straight leg raise test.   Skin:     General: Skin is warm and dry. Neurological:      General: No focal deficit present. Mental Status: She is alert and oriented to person, place, and time. Mental status is at baseline. Psychiatric:         Mood and Affect: Mood normal.         Behavior: Behavior normal.         Thought Content:  Thought content normal.                    Danny Montelongo

## 2023-09-08 DIAGNOSIS — E03.9 ACQUIRED HYPOTHYROIDISM: ICD-10-CM

## 2023-09-08 RX ORDER — LEVOTHYROXINE SODIUM 88 UG/1
88 TABLET ORAL DAILY
Qty: 90 TABLET | Refills: 1 | Status: SHIPPED | OUTPATIENT
Start: 2023-09-08

## 2023-09-20 ENCOUNTER — OFFICE VISIT (OUTPATIENT)
Dept: FAMILY MEDICINE CLINIC | Facility: MEDICAL CENTER | Age: 63
End: 2023-09-20
Payer: COMMERCIAL

## 2023-09-20 VITALS
HEART RATE: 72 BPM | HEIGHT: 67 IN | WEIGHT: 131 LBS | SYSTOLIC BLOOD PRESSURE: 136 MMHG | OXYGEN SATURATION: 96 % | DIASTOLIC BLOOD PRESSURE: 70 MMHG | TEMPERATURE: 98.3 F | BODY MASS INDEX: 20.56 KG/M2

## 2023-09-20 DIAGNOSIS — F41.8 DEPRESSION WITH ANXIETY: ICD-10-CM

## 2023-09-20 DIAGNOSIS — Z00.00 ANNUAL PHYSICAL EXAM: Primary | ICD-10-CM

## 2023-09-20 DIAGNOSIS — G89.29 CHRONIC LOW BACK PAIN WITHOUT SCIATICA, UNSPECIFIED BACK PAIN LATERALITY: ICD-10-CM

## 2023-09-20 DIAGNOSIS — M54.50 CHRONIC LOW BACK PAIN WITHOUT SCIATICA, UNSPECIFIED BACK PAIN LATERALITY: ICD-10-CM

## 2023-09-20 PROCEDURE — 99213 OFFICE O/P EST LOW 20 MIN: CPT

## 2023-09-20 PROCEDURE — 99396 PREV VISIT EST AGE 40-64: CPT

## 2023-09-20 RX ORDER — ESCITALOPRAM OXALATE 10 MG/1
10 TABLET ORAL DAILY
Qty: 90 TABLET | Refills: 1 | Status: SHIPPED | OUTPATIENT
Start: 2023-09-20

## 2023-09-20 NOTE — PROGRESS NOTES
ADULT ANNUAL PHYSICAL  [address] [facility]    NAME: Jake Cannon  AGE: 58 y.o. SEX: female  : 1960     DATE: 2023     Assessment and Plan:   Referral placed to comprehensive spine program.  Mammogram overdue, order reprinted and provided to patient. Vaccinations up-to-date. Advised about COVID and influenza vaccines. Colonoscopy up-to-date. Continue healthy diet and regular exercise. Return in 6 months for follow-up, sooner if needed. Problem List Items Addressed This Visit        Other    Depression with anxiety    Relevant Medications    escitalopram (LEXAPRO) 10 mg tablet   Other Visit Diagnoses     Annual physical exam    -  Primary    Chronic low back pain without sciatica, unspecified back pain laterality        Relevant Orders    Ambulatory Referral to Comprehensive Spine Program          Immunizations and preventive care screenings were discussed with patient today. Appropriate education was printed on patient's after visit summary. Counseling:  Alcohol/drug use: discussed moderation in alcohol intake, the recommendations for healthy alcohol use, and avoidance of illicit drug use. Dental Health: discussed importance of regular tooth brushing, flossing, and dental visits. Injury prevention: discussed safety/seat belts, safety helmets, smoke detectors, carbon dioxide detectors, and smoking near bedding or upholstery. Sexual health: discussed sexually transmitted diseases, partner selection, use of condoms, avoidance of unintended pregnancy, and contraceptive alternatives. Exercise: the importance of regular exercise/physical activity was discussed. Recommend exercise 3-5 times per week for at least 30 minutes. No follow-ups on file.      Chief Complaint:     Chief Complaint   Patient presents with   • Annual Exam      History of Present Illness:     Adult Annual Physical   Patient here for a comprehensive physical exam.   She appears to be doing well overall. She is complaining of chronic mid and low back pain, midline and bilateral lower back without sciatica or radiation of pain. She is currently attending physical therapy and did trial meloxicam however reports no relief. Will refer to comprehensive spine program.  X-ray of back from 6/2023 showed osteopenia in which she is following with endocrinology and currently on Fosamax weekly. She is also following with endocrinology for hypothyroidism, currently on levothyroxine. She has depression and anxiety. Well-controlled with use of Lexapro, refill requested. Diet and Physical Activity  Diet/Nutrition: well balanced diet. Exercise: Walks, but now limited due to lower back pain. Depression Screening  PHQ-2/9 Depression Screening         General Health  Sleep: sleeps well. Hearing: normal - bilateral.  Vision: Cataract surgery, now having blurred vision in bilateral eyes, seeing eye doctor tomorrow. .   Dental: regular dental visits. Review of Systems:     Review of Systems   Constitutional: Negative. HENT: Negative. Eyes: Negative. Respiratory: Negative. Cardiovascular: Negative. Gastrointestinal: Negative. Endocrine: Negative. Genitourinary: Negative. Musculoskeletal: Positive for back pain. Skin: Negative. Allergic/Immunologic: Negative. Neurological: Negative. Hematological: Negative. Psychiatric/Behavioral: Negative.        Past Medical History:     Past Medical History:   Diagnosis Date   • Anemia 8/17/2018   • Celiac disease    • Chiari I malformation ([address])    • Hyperlipemia    • Nonmelanoma skin cancer     last assessed 02/19/2015   • Squamous cell cancer of skin of ala nasi     2012   • Thoracic aortic aneurysm without rupture ([address]) 8/17/2018   • Thyroid disorder       Past Surgical History:     Past Surgical History:   Procedure Laterality Date   • CHOLECYSTECTOMY     • COLECTOMY ZEB     • COLONOSCOPY resolved 2010   • CORONARY ANGIOPLASTY WITH STENT PLACEMENT      celiac artery stent   • HYSTERECTOMY  2001   • LAPAROSCOPY      large intestine excision    • OOPHORECTOMY Bilateral 2001   • TRANSLUMINAL ANGIOPLASTY      intraoperative, renal artery       Social History:     Social History     Socioeconomic History   • Marital status: /Civil Union     Spouse name: None   • Number of children: None   • Years of education: None   • Highest education level: None   Occupational History   • Occupation: CNA   • Occupation: med     Tobacco Use   • Smoking status: Never   • Smokeless tobacco: Never   Vaping Use   • Vaping Use: Never used   Substance and Sexual Activity   • Alcohol use: Yes     Comment: 1 drink per month max   • Drug use: No   • Sexual activity: None   Other Topics Concern   • None   Social History Narrative    Caffeine use noted      Social Determinants of Health     Financial Resource Strain: Not on file   Food Insecurity: Not on file   Transportation Needs: Not on file   Physical Activity: Not on file   Stress: Not on file   Social Connections: Not on file   Intimate Partner Violence: Not on file   Housing Stability: Not on file      Family History:     Family History   Problem Relation Age of Onset   • Arthritis Mother    • COPD Mother    • Rheum arthritis Mother    • COPD Father    • Rheumatic fever Father    • Rheum arthritis Father    • Hypertension Father    • Lung cancer Father    • Crohn's disease Family    • Psoriasis Family    • Lupus Family    • Ulcerative colitis Family    • Breast cancer Cousin         over 48   • No Known Problems Sister    • No Known Problems Daughter    • No Known Problems Maternal Grandmother    • No Known Problems Maternal Grandfather    • No Known Problems Paternal Grandmother    • No Known Problems Paternal Grandfather    • No Known Problems Son    • No Known Problems Brother    • No Known Problems Brother    • No Known Problems Brother    • No Known Problems Brother    • Cancer Paternal Aunt    • Cancer Paternal Uncle       Current Medications:     Current Outpatient Medications   Medication Sig Dispense Refill   • acetaminophen (TYLENOL) 500 mg tablet Take 2 tablets by mouth daily as needed     • alendronate (FOSAMAX) 70 mg tablet TAKE 1 TABLET (70 MG TOTAL) BY MOUTH EVERY 7 DAYS 12 tablet 1   • Cholecalciferol (Vitamin D) 125 MCG (5000 UT) CAPS Take 6,000 Units by mouth in the morning     • escitalopram (LEXAPRO) 10 mg tablet Take 1 tablet (10 mg total) by mouth daily 90 tablet 1   • levothyroxine 88 mcg tablet Take 1 tablet (88 mcg total) by mouth daily Monday through Saturday and 1/2 tablet on Sunday 90 tablet 1   • Multiple Vitamins-Minerals (MULTIVITAMIN ADULT PO) Take 1 tablet by mouth daily     • rosuvastatin (CRESTOR) 10 MG tablet Take 1 tablet (10 mg total) by mouth daily 90 tablet 3     No current facility-administered medications for this visit. Allergies: Allergies   Allergen Reactions   • Morphine And Related Headache     Category: Adverse Reaction;       Physical Exam:     /70   Pulse 72   Temp 98.3 °F (36.8 °C)   Ht 5' 7" (1.702 m)   Wt 59.4 kg (131 lb)   SpO2 96%   BMI 20.52 kg/m²     Physical Exam  Vitals and nursing note reviewed. Constitutional:       General: She is not in acute distress. Appearance: Normal appearance. She is not ill-appearing. HENT:      Head: Normocephalic and atraumatic. Right Ear: Tympanic membrane, ear canal and external ear normal.      Left Ear: Tympanic membrane, ear canal and external ear normal.      Nose: Nose normal.      Mouth/Throat:      Mouth: Mucous membranes are moist.      Pharynx: Oropharynx is clear. Eyes:      General:         Right eye: No discharge. Left eye: No discharge. Conjunctiva/sclera: Conjunctivae normal.      Pupils: Pupils are equal, round, and reactive to light. Cardiovascular:      Rate and Rhythm: Normal rate and regular rhythm. Pulses: Normal pulses. Heart sounds: Normal heart sounds. Pulmonary:      Effort: Pulmonary effort is normal.      Breath sounds: Normal breath sounds. Abdominal:      General: Abdomen is flat. Bowel sounds are normal.      Palpations: Abdomen is soft. Tenderness: There is no abdominal tenderness. Musculoskeletal:         General: Normal range of motion. Cervical back: Normal, normal range of motion and neck supple. Thoracic back: Normal.      Lumbar back: Tenderness (midline, left and right sided, left > right) present. Skin:     General: Skin is warm and dry. Neurological:      General: No focal deficit present. Mental Status: She is alert and oriented to person, place, and time. Mental status is at baseline. Psychiatric:         Mood and Affect: Mood normal.         Behavior: Behavior normal.         Thought Content:  Thought content normal.          MARY ANN Everett  [facility]

## 2023-09-21 ENCOUNTER — TELEPHONE (OUTPATIENT)
Dept: PHYSICAL THERAPY | Facility: OTHER | Age: 63
End: 2023-09-21

## 2023-09-21 NOTE — TELEPHONE ENCOUNTER
Call placed to the patient per Comprehensive [facility] Program referral.    UNABLE TO LEAVE A MESSAGE. Received a fast/busy tone. This is the 1st attempt to reach the patient. Will defer referral per protocol. Note: Patient has a thoracis back pain referral. [facility]'s PT office has tried to contact patient twice and defer referral until 09/23 for a 3rd call. CSP referral for low back pain. Per FM note patient is attending PT ([facility]) ? ?

## 2023-09-27 ENCOUNTER — TELEPHONE (OUTPATIENT)
Age: 63
End: 2023-09-27

## 2023-09-27 NOTE — TELEPHONE ENCOUNTER
Patient called to inquire of phone number for a spine and pain center that was referred to her by PCP. She was given number to spine and pain center office in [address].

## 2023-11-02 ENCOUNTER — APPOINTMENT (OUTPATIENT)
Dept: RADIOLOGY | Facility: MEDICAL CENTER | Age: 63
End: 2023-11-02
Payer: COMMERCIAL

## 2023-11-02 ENCOUNTER — OFFICE VISIT (OUTPATIENT)
Dept: PAIN MEDICINE | Facility: CLINIC | Age: 63
End: 2023-11-02
Payer: COMMERCIAL

## 2023-11-02 VITALS
BODY MASS INDEX: 20.52 KG/M2 | HEART RATE: 72 BPM | SYSTOLIC BLOOD PRESSURE: 152 MMHG | DIASTOLIC BLOOD PRESSURE: 81 MMHG | WEIGHT: 131 LBS

## 2023-11-02 DIAGNOSIS — M25.552 BILATERAL HIP PAIN: ICD-10-CM

## 2023-11-02 DIAGNOSIS — M46.1 SACROILIITIS (HCC): ICD-10-CM

## 2023-11-02 DIAGNOSIS — G89.4 CHRONIC PAIN SYNDROME: ICD-10-CM

## 2023-11-02 DIAGNOSIS — G89.4 CHRONIC PAIN SYNDROME: Primary | ICD-10-CM

## 2023-11-02 DIAGNOSIS — M25.551 BILATERAL HIP PAIN: ICD-10-CM

## 2023-11-02 DIAGNOSIS — M62.838 MUSCLE SPASM: ICD-10-CM

## 2023-11-02 PROCEDURE — 73521 X-RAY EXAM HIPS BI 2 VIEWS: CPT

## 2023-11-02 PROCEDURE — 99204 OFFICE O/P NEW MOD 45 MIN: CPT | Performed by: PHYSICAL MEDICINE & REHABILITATION

## 2023-11-02 PROCEDURE — 72220 X-RAY EXAM SACRUM TAILBONE: CPT

## 2023-11-02 RX ORDER — METHOCARBAMOL 500 MG/1
500 TABLET, FILM COATED ORAL 2 TIMES DAILY PRN
Qty: 60 TABLET | Refills: 0 | Status: SHIPPED | OUTPATIENT
Start: 2023-11-02

## 2023-11-02 NOTE — PROGRESS NOTES
Assessment  1. Chronic pain syndrome    2. Sacroiliitis ([address])    3. Bilateral hip pain    4. Muscle spasm        Plan  Ms. Mary Page is a pleasant 19-year-old female significant past medical history of thoracic aortic aneurysm without rupture, COPD, insertion of iliac artery stent presents to [facility] for initial evaluation regarding worsening low back pain and bilateral hip pain. During today's evaluation she is demonstrating pain that is likely multifactorial in nature with the majority of her symptoms appear to be generating from the bilateral SI joints as well as the hips. No diagnostic imaging has been done or interventional approaches. We did discuss several different medication management options but she reports concerns with pain meds with significant family history as well as difficulty with certain meds due to her underlying comorbidities. At this time we will  1. Plan for bilateral SI joint injections under fluoroscopy guidance for both diagnostic and therapeutic purposes  2. We will order updated hip and sacral x-rays to evaluate for bony pathology contributing to symptoms. If diagnostic imaging matches clinical presentation would consider bilateral hip injections in the near future but for now we will focus on the SI joints  3. We will provide patient with Robaxin 500 mg twice a day as needed for muscle spasms and pain. My impressions and treatment recommendations were discussed in detail with the patient who verbalized understanding and had no further questions. Discharge instructions were provided. I personally saw and examined the patient and I agree with the above discussed plan of care.     Orders Placed This Encounter   Procedures    XR hips bilateral 2 vw w pelvis if performed     Standing Status:   Future     Number of Occurrences:   1     Standing Expiration Date:   11/2/2027     Scheduling Instructions:      Bring along any outside films relating to this procedure. XR sacrum and coccyx     Standing Status:   Future     Number of Occurrences:   1     Standing Expiration Date:   11/2/2027     Scheduling Instructions:      Bring along any outside films relating to this procedure. [place] spine and pain procedure     Standing Status:   Future     Standing Expiration Date:   11/2/2027     Order Specific Question:   Reason for Exam:     Answer:   Bilateral SIJ inj     Order Specific Question:   Anticoagulant hold needed? Answer:   No     New Medications Ordered This Visit   Medications    methocarbamol (ROBAXIN) 500 mg tablet     Sig: Take 1 tablet (500 mg total) by mouth 2 (two) times a day as needed for muscle spasms     Dispense:  60 tablet     Refill:  0       History of Present Illness    Boo Diaz is a 58 y.o. female presents to [facility] for initial evaluation regarding greater than 6 months duration of isolated low back and buttock pain. Patient is an  for the assisted in independent living facility and reports her pain gets progressively worse with prolonged standing. Denies any significant inciting event or recent trauma. Today reports moderate to severe pain rated 8 out of 10 and interfere with activities. Pain is described as a nearly constant shooting, throbbing, aching sensation that is worse in the morning in the evening. Symptoms are worse with standing, bending, sitting. Reports moderate relief with physical therapy. Denies smoking, marijuana use and reports to occasional alcohol use once per month. Currently using Motrin with no relief. Presents today for initial evaluation. I have personally reviewed and/or updated the patient's past medical history, past surgical history, family history, social history, current medications, allergies, and vital signs today. Review of Systems   Constitutional:  Negative for fever and unexpected weight change.    HENT:  Negative for trouble swallowing. Eyes:  Negative for visual disturbance. Respiratory:  Positive for shortness of breath. Negative for wheezing. Cardiovascular:  Negative for chest pain and palpitations. Gastrointestinal:  Negative for constipation, diarrhea, nausea and vomiting. Endocrine: Negative for cold intolerance, heat intolerance and polydipsia. Genitourinary:  Negative for difficulty urinating and frequency. Musculoskeletal:  Positive for back pain, gait problem, joint swelling, neck pain and neck stiffness. Negative for arthralgias and myalgias. Skin:  Negative for rash. Neurological:  Positive for headaches. Negative for dizziness, seizures, syncope and weakness. Hematological:  Does not bruise/bleed easily. Psychiatric/Behavioral:  Positive for dysphoric mood. The patient is nervous/anxious. All other systems reviewed and are negative.       Patient Active Problem List   Diagnosis    Anemia    Thoracic aortic aneurysm without rupture ([address])    Celiac disease    Iron deficiency anemia    Hypothyroidism    Hyperlipidemia    Age-related osteoporosis without current pathological fracture    Vitamin D deficiency    S/P insertion of iliac artery stent    Urinary symptom or sign    Left lower quadrant abdominal pain    Pain in left lumbar region of back    Asthma    Depression with anxiety    COPD (chronic obstructive pulmonary disease) ([address])       Past Medical History:   Diagnosis Date    Anemia 8/17/2018    Celiac disease     Chiari I malformation ([address])     Hyperlipemia     Nonmelanoma skin cancer     last assessed 02/19/2015    Squamous cell cancer of skin of ala nasi     2012    Thoracic aortic aneurysm without rupture ([address]) 8/17/2018    Thyroid disorder        Past Surgical History:   Procedure Laterality Date    CHOLECYSTECTOMY      COLECTOMY ZEB      COLONOSCOPY      resolved 2010    CORONARY ANGIOPLASTY WITH STENT PLACEMENT      celiac artery stent    HYSTERECTOMY  2001    LAPAROSCOPY      large intestine excision     OOPHORECTOMY Bilateral 2001    TRANSLUMINAL ANGIOPLASTY      intraoperative, renal artery        Family History   Problem Relation Age of Onset    Arthritis Mother     COPD Mother     Rheum arthritis Mother     COPD Father     Rheumatic fever Father     Rheum arthritis Father     Hypertension Father     Lung cancer Father     Crohn's disease Family     Psoriasis Family     Lupus Family     Ulcerative colitis Family     Breast cancer Cousin         over 48    No Known Problems Sister     No Known Problems Daughter     No Known Problems Maternal Grandmother     No Known Problems Maternal Grandfather     No Known Problems Paternal Grandmother     No Known Problems Paternal Grandfather     No Known Problems Son     No Known Problems Brother     No Known Problems Brother     No Known Problems Brother     No Known Problems Brother     Cancer Paternal Aunt     Cancer Paternal Uncle        Social History     Occupational History    Occupation: CNA    Occupation: med     Tobacco Use    Smoking status: Never    Smokeless tobacco: Never   Vaping Use    Vaping Use: Never used   Substance and Sexual Activity    Alcohol use: Yes     Comment: 1 drink per month max    Drug use: No    Sexual activity: Not on file       Current Outpatient Medications on File Prior to Visit   Medication Sig    escitalopram (LEXAPRO) 10 mg tablet Take 1 tablet (10 mg total) by mouth daily    levothyroxine 88 mcg tablet Take 1 tablet (88 mcg total) by mouth daily Monday through Saturday and 1/2 tablet on Sunday    rosuvastatin (CRESTOR) 10 MG tablet Take 1 tablet (10 mg total) by mouth daily    acetaminophen (TYLENOL) 500 mg tablet Take 2 tablets by mouth daily as needed    alendronate (FOSAMAX) 70 mg tablet TAKE 1 TABLET (70 MG TOTAL) BY MOUTH EVERY 7 DAYS    Cholecalciferol (Vitamin D) 125 MCG (5000 UT) CAPS Take 6,000 Units by mouth in the morning    Multiple Vitamins-Minerals (MULTIVITAMIN ADULT PO) Take 1 tablet by mouth daily     No current facility-administered medications on file prior to visit. Allergies   Allergen Reactions    Morphine And Related Headache     Category: Adverse Reaction;        Physical Exam    /81   Pulse 72   Wt 59.4 kg (131 lb)   BMI 20.52 kg/m²     Constitutional: normal, well developed, well nourished, alert, in no distress and non-toxic and no overt pain behavior. Eyes: anicteric  HEENT: grossly intact  Neck: supple, symmetric, trachea midline and no masses   Pulmonary:even and unlabored  Cardiovascular:No edema or pitting edema present  Skin:Normal without rashes or lesions and well hydrated  Psychiatric:Mood and affect appropriate  Neurologic:Cranial Nerves II-XII grossly intact  Musculoskeletal:antalgic, tenderness to palpation bilateral lumbar paraspinals and distal to PSIS, decreased active and passive range of motion with lumbar flexion and extension lateral hip pain, MMT 5 out of 5 bilateral lower extremities, sensation grossly tact to light touch,  POSITIVE Sacral compression testing bilaterally  POSITIVE Say's test bilaterally  POSITIVE thigh thrust bilaterally  Positive scour test bilaterally    Imaging      SCOLIOSIS     INDICATION:   M81.0: Age-related osteoporosis without current pathological fracture  M54.50: Low back pain, unspecified. COMPARISON:  None     VIEWS:  XR SPINE ENTIRE AP AND LATERAL  Images: 6     FINDINGS: Cholecystectomy clips noted. Vascular stent identified in the upper mid abdomen. Please note that the cervicothoracic junction is not imaged in the AP projection. There is no fracture, pathologic bone lesion or congenital segmentation anomaly. Minimal levoconvex asymmetry in the mid to lower thoracic spine. Minimal degenerative anterolisthesis of C4 on C5. Alignment otherwise unremarkable. Generalized osteopenia. Mild degenerative changes. IMPRESSION:     No acute osseous abnormalities. Generalized osteopenia.

## 2023-11-27 ENCOUNTER — HOSPITAL ENCOUNTER (OUTPATIENT)
Dept: RADIOLOGY | Facility: CLINIC | Age: 63
Discharge: HOME | End: 2023-11-27
Payer: COMMERCIAL

## 2023-11-27 VITALS
OXYGEN SATURATION: 97 % | TEMPERATURE: 97.6 F | RESPIRATION RATE: 18 BRPM | DIASTOLIC BLOOD PRESSURE: 74 MMHG | SYSTOLIC BLOOD PRESSURE: 162 MMHG | HEART RATE: 71 BPM

## 2023-11-27 DIAGNOSIS — M25.551 BILATERAL HIP PAIN: ICD-10-CM

## 2023-11-27 DIAGNOSIS — M46.1 SACROILIITIS (HCC): ICD-10-CM

## 2023-11-27 DIAGNOSIS — M25.552 BILATERAL HIP PAIN: ICD-10-CM

## 2023-11-27 DIAGNOSIS — G89.4 CHRONIC PAIN SYNDROME: ICD-10-CM

## 2023-11-27 PROCEDURE — 27096 INJECT SACROILIAC JOINT: CPT | Performed by: PHYSICAL MEDICINE & REHABILITATION

## 2023-11-27 RX ORDER — BUPIVACAINE HCL/PF 2.5 MG/ML
5 VIAL (ML) INJECTION ONCE
Status: COMPLETED | OUTPATIENT
Start: 2023-11-27 | End: 2023-11-27

## 2023-11-27 RX ORDER — 0.9 % SODIUM CHLORIDE 0.9 %
10 VIAL (ML) INJECTION ONCE
Status: COMPLETED | OUTPATIENT
Start: 2023-11-27 | End: 2023-11-27

## 2023-11-27 RX ORDER — METHYLPREDNISOLONE ACETATE 80 MG/ML
80 INJECTION, SUSPENSION INTRA-ARTICULAR; INTRALESIONAL; INTRAMUSCULAR; PARENTERAL; SOFT TISSUE ONCE
Status: COMPLETED | OUTPATIENT
Start: 2023-11-27 | End: 2023-11-27

## 2023-11-27 RX ADMIN — METHYLPREDNISOLONE ACETATE 80 MG: 80 INJECTION, SUSPENSION INTRA-ARTICULAR; INTRALESIONAL; INTRAMUSCULAR; PARENTERAL; SOFT TISSUE at 14:30

## 2023-11-27 RX ADMIN — Medication 4 ML: at 14:28

## 2023-11-27 RX ADMIN — SODIUM CHLORIDE 4 ML: 9 INJECTION, SOLUTION INTRAMUSCULAR; INTRAVENOUS; SUBCUTANEOUS at 14:28

## 2023-11-27 RX ADMIN — BUPIVACAINE HYDROCHLORIDE 5 ML: 2.5 INJECTION, SOLUTION EPIDURAL; INFILTRATION; INTRACAUDAL at 14:30

## 2023-11-27 RX ADMIN — IOHEXOL 2 ML: 300 INJECTION, SOLUTION INTRAVENOUS at 14:30

## 2023-11-27 NOTE — DISCHARGE INSTR - LAB

## 2023-12-04 ENCOUNTER — TELEPHONE (OUTPATIENT)
Dept: PAIN MEDICINE | Facility: CLINIC | Age: 63
End: 2023-12-04

## 2023-12-07 DIAGNOSIS — M81.0 AGE-RELATED OSTEOPOROSIS WITHOUT CURRENT PATHOLOGICAL FRACTURE: ICD-10-CM

## 2023-12-07 RX ORDER — ALENDRONATE SODIUM 70 MG/1
70 TABLET ORAL
Qty: 12 TABLET | Refills: 1 | Status: SHIPPED | OUTPATIENT
Start: 2023-12-07 | End: 2024-03-06

## 2023-12-19 ENCOUNTER — APPOINTMENT (OUTPATIENT)
Dept: LAB | Facility: MEDICAL CENTER | Age: 63
End: 2023-12-19
Payer: COMMERCIAL

## 2023-12-19 DIAGNOSIS — E78.00 PURE HYPERCHOLESTEROLEMIA: ICD-10-CM

## 2023-12-19 DIAGNOSIS — E03.9 ACQUIRED HYPOTHYROIDISM: ICD-10-CM

## 2023-12-19 DIAGNOSIS — E55.9 VITAMIN D DEFICIENCY: ICD-10-CM

## 2023-12-19 LAB
25(OH)D3 SERPL-MCNC: 74.4 NG/ML (ref 30–100)
ANION GAP SERPL CALCULATED.3IONS-SCNC: 8 MMOL/L
BUN SERPL-MCNC: 14 MG/DL (ref 5–25)
CALCIUM SERPL-MCNC: 9.8 MG/DL (ref 8.4–10.2)
CHLORIDE SERPL-SCNC: 102 MMOL/L (ref 96–108)
CHOLEST SERPL-MCNC: 212 MG/DL
CO2 SERPL-SCNC: 29 MMOL/L (ref 21–32)
CREAT SERPL-MCNC: 0.64 MG/DL (ref 0.6–1.3)
GFR SERPL CREATININE-BSD FRML MDRD: 94 ML/MIN/1.73SQ M
GLUCOSE P FAST SERPL-MCNC: 95 MG/DL (ref 65–99)
HDLC SERPL-MCNC: 78 MG/DL
LDLC SERPL CALC-MCNC: 123 MG/DL (ref 0–100)
POTASSIUM SERPL-SCNC: 3.7 MMOL/L (ref 3.5–5.3)
SODIUM SERPL-SCNC: 139 MMOL/L (ref 135–147)
T4 FREE SERPL-MCNC: 1.15 NG/DL (ref 0.61–1.12)
TRIGL SERPL-MCNC: 56 MG/DL
TSH SERPL DL<=0.05 MIU/L-ACNC: 1.36 UIU/ML (ref 0.45–4.5)

## 2023-12-19 PROCEDURE — 84439 ASSAY OF FREE THYROXINE: CPT

## 2023-12-19 PROCEDURE — 80048 BASIC METABOLIC PNL TOTAL CA: CPT

## 2023-12-19 PROCEDURE — 84443 ASSAY THYROID STIM HORMONE: CPT

## 2023-12-19 PROCEDURE — 82306 VITAMIN D 25 HYDROXY: CPT

## 2023-12-19 PROCEDURE — 80061 LIPID PANEL: CPT

## 2023-12-19 PROCEDURE — 36415 COLL VENOUS BLD VENIPUNCTURE: CPT

## 2023-12-30 DIAGNOSIS — E03.9 ACQUIRED HYPOTHYROIDISM: ICD-10-CM

## 2024-01-02 RX ORDER — LEVOTHYROXINE SODIUM 88 UG/1
88 TABLET ORAL DAILY
Qty: 90 TABLET | Refills: 1 | Status: SHIPPED | OUTPATIENT
Start: 2024-01-02 | End: 2024-01-03 | Stop reason: SDUPTHER

## 2024-01-03 DIAGNOSIS — E03.9 ACQUIRED HYPOTHYROIDISM: ICD-10-CM

## 2024-01-03 RX ORDER — LEVOTHYROXINE SODIUM 88 UG/1
88 TABLET ORAL DAILY
Qty: 90 TABLET | Refills: 1 | Status: SHIPPED | OUTPATIENT
Start: 2024-01-03

## 2024-01-04 ENCOUNTER — TELEPHONE (OUTPATIENT)
Dept: ENDOCRINOLOGY | Facility: CLINIC | Age: 64
End: 2024-01-04

## 2024-02-14 ENCOUNTER — TREATMENT (OUTPATIENT)
Dept: ENDOCRINOLOGY | Facility: CLINIC | Age: 64
End: 2024-02-14

## 2024-02-14 ENCOUNTER — OFFICE VISIT (OUTPATIENT)
Dept: ENDOCRINOLOGY | Facility: CLINIC | Age: 64
End: 2024-02-14
Payer: MEDICARE

## 2024-02-14 VITALS
HEART RATE: 91 BPM | SYSTOLIC BLOOD PRESSURE: 160 MMHG | BODY MASS INDEX: 20.4 KG/M2 | WEIGHT: 130 LBS | OXYGEN SATURATION: 96 % | DIASTOLIC BLOOD PRESSURE: 82 MMHG | HEIGHT: 67 IN

## 2024-02-14 DIAGNOSIS — E03.9 ACQUIRED HYPOTHYROIDISM: Primary | ICD-10-CM

## 2024-02-14 DIAGNOSIS — E55.9 VITAMIN D DEFICIENCY: ICD-10-CM

## 2024-02-14 DIAGNOSIS — M81.0 AGE-RELATED OSTEOPOROSIS WITHOUT CURRENT PATHOLOGICAL FRACTURE: Primary | ICD-10-CM

## 2024-02-14 DIAGNOSIS — M81.0 AGE-RELATED OSTEOPOROSIS WITHOUT CURRENT PATHOLOGICAL FRACTURE: ICD-10-CM

## 2024-02-14 DIAGNOSIS — K90.0 CELIAC DISEASE: ICD-10-CM

## 2024-02-14 PROCEDURE — 99214 OFFICE O/P EST MOD 30 MIN: CPT | Performed by: INTERNAL MEDICINE

## 2024-02-14 RX ORDER — ZOLEDRONIC ACID 5 MG/100ML
5 INJECTION, SOLUTION INTRAVENOUS ONCE
OUTPATIENT
Start: 2024-02-16

## 2024-02-14 RX ORDER — SODIUM CHLORIDE 9 MG/ML
20 INJECTION, SOLUTION INTRAVENOUS ONCE
OUTPATIENT
Start: 2024-02-16

## 2024-02-14 RX ORDER — CHOLECALCIFEROL (VITAMIN D3) 125 MCG
CAPSULE ORAL
Start: 2024-02-14

## 2024-02-14 NOTE — PROGRESS NOTES
Mojgan Lopez 64 y.o. female MRN: [id number]    Encounter: [id number]      Assessment/Plan     Assessment:  This is a 64 y.o.-year-old female with vitamin D deficiency.    Plan:    Assessment/Plan      Diagnoses and all orders for this visit:    Acquired hypothyroidism  Lab Results   Component Value Date    ISA0QZIIJGZS 1.360 12/19/2023    TSH 2.27 06/10/2023   TSH is 1.3  Continue levothyroxine 88 mcg daily from Monday through Saturday and half tablet on Sunday    -     TSH + Free T4; Future    Age-related osteoporosis without current pathological fracture  Based on most recent DEXA scan report from January 2023, there is 5% worsening of bone mineral density at lumbar spine.  Bone density at lumbar spine is -3.0  Discussed outpatient to IV Reclast infusion or Prolia injection every 6 months  Discussed the drug holiday with Reclast infusion if bone density improves  Discussed the side effects and benefits of both treatments.  Patient is agreeable to start IV Reclast infusion  -     NTX Panel, Urine; Future    Celiac disease  Currently on gluten-free diet  Vitamin D deficiency  Most recent vitamin D was high, discussed to take vitamin D3 supplementation 6000 IU every other day  -     Cholecalciferol (Vitamin D) 125 MCG (5000 UT) CAPS; Take 6000 IU every other day  -     Basic metabolic panel; Future  -     Vitamin D 25 hydroxy; Future         CC:     Osteoporosis, Hypothyroidism     History of Present Illness     HPI:    Mojgan Lopez is 64-year-old woman with medical history of osteoporosis, hypothyroidism, vitamin D deficiency, celiac disease is here for follow-up.    For hypothyroidism, she takes levothyroxine 88 mcg daily from Monday through Saturday and half tablet on Sunday.  She takes it on empty stomach 1 hour before breakfast    For hyperlipidemia, she takes Crestor 10 mg daily    For osteoporosis, she takes Fosamax 70 mg once a week, she also takes calcium and vitamin D3  supplementation.  Fosamax was started in November 2021  Currently she is taking vitamin D3 supplementation 6000 IU daily  Her last vitamin D was 74 in December 2023    For hyperlipidemia, she takes Crestor 10 mg daily    DEXA scan which was done in February 2023 showed lumbar spine T-score -3.2, left total hip T-score -1.9, left femoral neck T-score is -2.1 suggestive of osteoporosis.  Statistically significant decrease in BMD in lumbar spine.  10-year risk of hip fracture is 2.5% and 10-year risk of major osteoporotic fracture is 15%    Lab Results   Component Value Date    [id number] 1.360 12/19/2023    TSH 2.27 06/10/2023     Review of Systems   Constitutional:  Negative for activity change, diaphoresis, fatigue, fever and unexpected weight change.   HENT: Negative.     Eyes:  Negative for visual disturbance.   Respiratory:  Negative for cough, chest tightness and shortness of breath.    Cardiovascular:  Negative for chest pain, palpitations and leg swelling.   Gastrointestinal:  Negative for abdominal pain, blood in stool, constipation, diarrhea, nausea and vomiting.   Endocrine: Negative for cold intolerance, heat intolerance, polydipsia, polyphagia and polyuria.   Genitourinary:  Negative for dysuria, enuresis, frequency and urgency.   Musculoskeletal:  Negative for arthralgias and myalgias.   Skin:  Negative for pallor, rash and wound.   Allergic/Immunologic: Negative.    Neurological:  Negative for dizziness, tremors, weakness and numbness.   Hematological: Negative.    Psychiatric/Behavioral: Negative.         Historical Information   Past Medical History:   Diagnosis Date    Anemia 8/17/2018    Celiac disease     Chiari I malformation (HCC)     Hyperlipemia     Nonmelanoma skin cancer     last assessed 02/19/2015    Squamous cell cancer of skin of ala nasi     2012    Thoracic aortic aneurysm without rupture (HCC) 8/17/2018    Thyroid disorder      Past Surgical History:   Procedure Laterality Date     CHOLECYSTECTOMY      COLECTOMY ZEB      COLONOSCOPY      resolved 2010    CORONARY ANGIOPLASTY WITH STENT PLACEMENT      celiac artery stent    HYSTERECTOMY  2001    LAPAROSCOPY      large intestine excision     OOPHORECTOMY Bilateral 2001    TRANSLUMINAL ANGIOPLASTY      intraoperative, renal artery      Social History   Social History     Substance and Sexual Activity   Alcohol Use Yes    Comment: 1 drink per month max     Social History     Substance and Sexual Activity   Drug Use No     Social History     Tobacco Use   Smoking Status Never   Smokeless Tobacco Never     Family History:   Family History   Problem Relation Age of Onset    Arthritis Mother     COPD Mother     Rheum arthritis Mother     COPD Father     Rheumatic fever Father     Rheum arthritis Father     Hypertension Father     Lung cancer Father     Crohn's disease Family     Psoriasis Family     Lupus Family     Ulcerative colitis Family     Breast cancer Cousin         over 50    No Known Problems Sister     No Known Problems Daughter     No Known Problems Maternal Grandmother     No Known Problems Maternal Grandfather     No Known Problems Paternal Grandmother     No Known Problems Paternal Grandfather     No Known Problems Son     No Known Problems Brother     No Known Problems Brother     No Known Problems Brother     No Known Problems Brother     Cancer Paternal Aunt     Cancer Paternal Uncle        Meds/Allergies   Current Outpatient Medications   Medication Sig Dispense Refill    acetaminophen (TYLENOL) 500 mg tablet Take 2 tablets by mouth daily as needed      Cholecalciferol (Vitamin D) 125 MCG (5000 UT) CAPS Take 6000 IU every other day      escitalopram (LEXAPRO) 10 mg tablet Take 1 tablet (10 mg total) by mouth daily 90 tablet 1    levothyroxine 88 mcg tablet Take 1 tablet (88 mcg total) by mouth daily Monday through Saturday and 1/2 tablet on Sunday 90 tablet 1    methocarbamol (ROBAXIN) 500 mg tablet Take 1 tablet (500 mg total) by  "mouth 2 (two) times a day as needed for muscle spasms 60 tablet 0    Multiple Vitamins-Minerals (MULTIVITAMIN ADULT PO) Take 1 tablet by mouth daily      rosuvastatin (CRESTOR) 10 MG tablet Take 1 tablet (10 mg total) by mouth daily 90 tablet 3     No current facility-administered medications for this visit.     Allergies   Allergen Reactions    Morphine And Related Headache     Category: Adverse Reaction;        Objective   Vitals: Blood pressure 160/82, pulse 91, height 5' 7\" (1.702 m), weight 59 kg (130 lb), SpO2 96%, not currently breastfeeding.    Physical Exam  Vitals reviewed.   Constitutional:       General: She is not in acute distress.     Appearance: Normal appearance. She is not ill-appearing.   HENT:      Head: Normocephalic and atraumatic.      Nose: Nose normal.   Eyes:      Extraocular Movements: Extraocular movements intact.      Conjunctiva/sclera: Conjunctivae normal.   Pulmonary:      Effort: No respiratory distress.   Musculoskeletal:      Cervical back: Normal range of motion.   Neurological:      General: No focal deficit present.      Mental Status: She is alert and oriented to person, place, and time.   Psychiatric:         Mood and Affect: Mood normal.         Behavior: Behavior normal.         The history was obtained from the review of the chart, patient.    Lab Results:   Lab Results   Component Value Date/Time    TSH 3RD GENERATON 1.360 12/19/2023 09:41 AM    Free T4 1.15 (H) 12/19/2023 09:41 AM    Free t4 1.1 06/10/2023 08:02 AM       Imaging Studies:       I have personally reviewed pertinent reports.      Portions of the record may have been created with voice recognition software. Occasional wrong word or \"sound a like\" substitutions may have occurred due to the inherent limitations of voice recognition software. Read the chart carefully and recognize, using context, where substitutions have occurred.    "

## 2024-03-07 ENCOUNTER — TELEPHONE (OUTPATIENT)
Age: 64
End: 2024-03-07

## 2024-03-07 NOTE — TELEPHONE ENCOUNTER
The patient has an upcoming appointment does she need to get labwork done ?  Please let her know thank you

## 2024-03-12 DIAGNOSIS — E78.00 PURE HYPERCHOLESTEROLEMIA: ICD-10-CM

## 2024-03-12 DIAGNOSIS — F41.8 DEPRESSION WITH ANXIETY: ICD-10-CM

## 2024-03-12 RX ORDER — ROSUVASTATIN CALCIUM 10 MG/1
10 TABLET, COATED ORAL DAILY
Qty: 90 TABLET | Refills: 3 | Status: SHIPPED | OUTPATIENT
Start: 2024-03-12

## 2024-03-12 RX ORDER — ESCITALOPRAM OXALATE 10 MG/1
10 TABLET ORAL DAILY
Qty: 90 TABLET | Refills: 1 | Status: SHIPPED | OUTPATIENT
Start: 2024-03-12

## 2024-03-26 ENCOUNTER — OFFICE VISIT (OUTPATIENT)
Dept: FAMILY MEDICINE CLINIC | Facility: MEDICAL CENTER | Age: 64
End: 2024-03-26
Payer: MEDICARE

## 2024-03-26 VITALS
DIASTOLIC BLOOD PRESSURE: 74 MMHG | SYSTOLIC BLOOD PRESSURE: 120 MMHG | TEMPERATURE: 98.4 F | HEIGHT: 67 IN | OXYGEN SATURATION: 98 % | HEART RATE: 73 BPM | WEIGHT: 130.6 LBS | BODY MASS INDEX: 20.5 KG/M2

## 2024-03-26 DIAGNOSIS — I71.20 THORACIC AORTIC ANEURYSM WITHOUT RUPTURE, UNSPECIFIED PART (HCC): ICD-10-CM

## 2024-03-26 DIAGNOSIS — G47.00 INSOMNIA, UNSPECIFIED TYPE: ICD-10-CM

## 2024-03-26 DIAGNOSIS — F41.8 DEPRESSION WITH ANXIETY: Primary | ICD-10-CM

## 2024-03-26 DIAGNOSIS — Z12.31 ENCOUNTER FOR SCREENING MAMMOGRAM FOR BREAST CANCER: ICD-10-CM

## 2024-03-26 PROBLEM — E46 PROTEIN-CALORIE MALNUTRITION, UNSPECIFIED SEVERITY (HCC): Status: ACTIVE | Noted: 2024-03-26

## 2024-03-26 PROCEDURE — 99214 OFFICE O/P EST MOD 30 MIN: CPT

## 2024-03-26 RX ORDER — TRAZODONE HYDROCHLORIDE 50 MG/1
50 TABLET ORAL
Qty: 30 TABLET | Refills: 0 | Status: SHIPPED | OUTPATIENT
Start: 2024-03-26

## 2024-03-26 NOTE — PROGRESS NOTES
Assessment and Plan:     Problem List Items Addressed This Visit     Thoracic aortic aneurysm without rupture (HCC)    COPD (chronic obstructive pulmonary disease) (HCC)    Sacroiliitis (HCC)    Protein-calorie malnutrition, unspecified severity (HCC)   Other Visit Diagnoses     Insomnia, unspecified type    -  Primary    Relevant Medications    traZODone (DESYREL) 50 mg tablet    Encounter for screening mammogram for breast cancer        Relevant Orders    Mammo screening bilateral w 3d & cad           Preventive health issues were discussed with patient, and age appropriate screening tests were ordered as noted in patient's After Visit Summary.  Personalized health advice and appropriate referrals for health education or preventive services given if needed, as noted in patient's After Visit Summary.     History of Present Illness:     Patient presents for a Medicare Wellness Visit    HPI   Patient Care Team:  MARY ANN Sanders as PCP - General (Nurse Practitioner)  Mark Aguilera DO as PCP - PCP-[facility] ([facility])  MD JUAN Tucker MD Sherilyn T McCollum, DO     Review of Systems:     Review of Systems     Problem List:     Patient Active Problem List   Diagnosis   • Anemia   • Thoracic aortic aneurysm without rupture (HCC)   • Celiac disease   • Iron deficiency anemia   • Hypothyroidism   • Hyperlipidemia   • Age-related osteoporosis without current pathological fracture   • Vitamin D deficiency   • S/P insertion of iliac artery stent   • Urinary symptom or sign   • Left lower quadrant abdominal pain   • Pain in left lumbar region of back   • Asthma   • Depression with anxiety   • COPD (chronic obstructive pulmonary disease) (HCC)   • Sacroiliitis (HCC)   • Protein-calorie malnutrition, unspecified severity (HCC)      Past Medical and Surgical History:     Past Medical History:   Diagnosis Date   • Anemia 8/17/2018   • Celiac disease    • Chiari I malformation (HCC)    •  Hyperlipemia    • Nonmelanoma skin cancer     last assessed 02/19/2015   • Squamous cell cancer of skin of ala nasi     2012   • Thoracic aortic aneurysm without rupture (HCC) 8/17/2018   • Thyroid disorder      Past Surgical History:   Procedure Laterality Date   • CHOLECYSTECTOMY     • COLECTOMY ZEB     • COLONOSCOPY      resolved 2010   • CORONARY ANGIOPLASTY WITH STENT PLACEMENT      celiac artery stent   • HYSTERECTOMY  2001   • LAPAROSCOPY      large intestine excision    • OOPHORECTOMY Bilateral 2001   • TRANSLUMINAL ANGIOPLASTY      intraoperative, renal artery       Family History:     Family History   Problem Relation Age of Onset   • Arthritis Mother    • COPD Mother    • Rheum arthritis Mother    • COPD Father    • Rheumatic fever Father    • Rheum arthritis Father    • Hypertension Father    • Lung cancer Father    • Crohn's disease Family    • Psoriasis Family    • Lupus Family    • Ulcerative colitis Family    • Breast cancer Cousin         over 50   • No Known Problems Sister    • No Known Problems Daughter    • No Known Problems Maternal Grandmother    • No Known Problems Maternal Grandfather    • No Known Problems Paternal Grandmother    • No Known Problems Paternal Grandfather    • No Known Problems Son    • No Known Problems Brother    • No Known Problems Brother    • No Known Problems Brother    • No Known Problems Brother    • Cancer Paternal Aunt    • Cancer Paternal Uncle       Social History:     Social History     Socioeconomic History   • Marital status: /Civil Union     Spouse name: None   • Number of children: None   • Years of education: None   • Highest education level: None   Occupational History   • Occupation: CNA   • Occupation: med     Tobacco Use   • Smoking status: Never   • Smokeless tobacco: Never   Vaping Use   • Vaping status: Never Used   Substance and Sexual Activity   • Alcohol use: Yes     Comment: 1 drink per month max   • Drug use: No   • Sexual  activity: None   Other Topics Concern   • None   Social History Narrative    Caffeine use noted      Social Determinants of Health     Financial Resource Strain: Not on file   Food Insecurity: No Food Insecurity (3/26/2024)    Hunger Vital Sign    • Worried About Running Out of Food in the Last Year: Never true    • Ran Out of Food in the Last Year: Never true   Transportation Needs: No Transportation Needs (3/26/2024)    PRAPARE - Transportation    • Lack of Transportation (Medical): No    • Lack of Transportation (Non-Medical): No   Physical Activity: Not on file   Stress: Not on file   Social Connections: Not on file   Intimate Partner Violence: Not on file   Housing Stability: Unknown (3/26/2024)    Housing Stability Vital Sign    • Unable to Pay for Housing in the Last Year: No    • Number of Places Lived in the Last Year: Not on file    • Unstable Housing in the Last Year: No      Medications and Allergies:     Current Outpatient Medications   Medication Sig Dispense Refill   • traZODone (DESYREL) 50 mg tablet Take 1 tablet (50 mg total) by mouth daily at bedtime 30 tablet 0   • acetaminophen (TYLENOL) 500 mg tablet Take 2 tablets by mouth daily as needed     • Cholecalciferol (Vitamin D) 125 MCG (5000 UT) CAPS Take 6000 IU every other day     • escitalopram (LEXAPRO) 10 mg tablet TAKE 1 TABLET BY MOUTH EVERY DAY 90 tablet 1   • levothyroxine 88 mcg tablet Take 1 tablet (88 mcg total) by mouth daily Monday through Saturday and 1/2 tablet on Sunday 90 tablet 1   • methocarbamol (ROBAXIN) 500 mg tablet Take 1 tablet (500 mg total) by mouth 2 (two) times a day as needed for muscle spasms 60 tablet 0   • Multiple Vitamins-Minerals (MULTIVITAMIN ADULT PO) Take 1 tablet by mouth daily     • rosuvastatin (CRESTOR) 10 MG tablet TAKE 1 TABLET BY MOUTH EVERY DAY 90 tablet 3     No current facility-administered medications for this visit.     Allergies   Allergen Reactions   • Morphine And Related Headache     Category:  Adverse Reaction;       Immunizations:     Immunization History   Administered Date(s) Administered   • COVID-19 MODERNA VACC 0.5 ML IM 11/18/2022   • COVID-19 PFIZER VACCINE 0.3 ML IM 01/21/2021, 02/11/2021, 11/12/2021   • H1N1, All Formulations 01/11/2010   • INFLUENZA 10/01/2019, 11/01/2020, 10/05/2021   • Influenza Quadrivalent Preservative Free 3 years and older IM 09/23/2015   • Influenza, seasonal, injectable 10/11/2007   • Pneumococcal Conjugate Vaccine 20-valent (Pcv20), Polysace 01/05/2023   • Pneumococcal Polysaccharide PPV23 10/06/2021   • Tdap 01/05/2023      Health Maintenance:         Topic Date Due   • Breast Cancer Screening: Mammogram  04/27/2023   • Colorectal Cancer Screening  05/01/2024   • DXA SCAN  02/15/2028   • HIV Screening  Completed   • Hepatitis C Screening  Completed     There are no preventive care reminders to display for this patient.     Medicare Screening Tests and Risk Assessments:     Mojgan is here for her Subsequent Wellness visit. Last Medicare Wellness visit information reviewed, patient interviewed and updates made to the record today.      Health Risk Assessment:   Patient rates overall health as very good. Patient feels that their physical health rating is same. Patient is very satisfied with their life. Eyesight was rated as slightly worse. Hearing was rated as same. Patient feels that their emotional and mental health rating is same. Patients states they are never, rarely angry. Patient states they are often unusually tired/fatigued. Pain experienced in the last 7 days has been none. Patient states that she has experienced no weight loss or gain in last 6 months.     Depression Screening:   PHQ-9 Score: 2      Fall Risk Screening:   In the past year, patient has experienced: no history of falling in past year      Urinary Incontinence Screening:   Patient has not leaked urine accidently in the last six months.     Home Safety:  Patient does not have trouble with  stairs inside or outside of their home. Patient has working smoke alarms and has working carbon monoxide detector. Home safety hazards include: none.     Nutrition:   Current diet is Regular.     Medications:   Patient is not currently taking any over-the-counter supplements. Patient is able to manage medications.     Activities of Daily Living (ADLs)/Instrumental Activities of Daily Living (IADLs):   Walk and transfer into and out of bed and chair?: Yes  Dress and groom yourself?: Yes    Bathe or shower yourself?: Yes    Feed yourself? Yes  Do your laundry/housekeeping?: Yes  Manage your money, pay your bills and track your expenses?: Yes  Make your own meals?: Yes    Do your own shopping?: Yes    Previous Hospitalizations:   Any hospitalizations or ED visits within the last 12 months?: No      Advance Care Planning:   Living will: No      PREVENTIVE SCREENINGS      Cardiovascular Screening:    General: Screening Not Indicated and History Lipid Disorder      Diabetes Screening:     General: Screening Current      Colorectal Cancer Screening:     General: Screening Current      Cervical Cancer Screening:    General: Screening Not Indicated      Osteoporosis Screening:    General: Screening Not Indicated and History Osteoporosis      Abdominal Aortic Aneurysm (AAA) Screening:        General: Screening Not Indicated and History AAA      Lung Cancer Screening:     General: Screening Not Indicated      Hepatitis C Screening:    General: Screening Current    Screening, Brief Intervention, and Referral to Treatment (SBIRT)    Screening  Typical number of drinks in a day: 0  Typical number of drinks in a week: 1  Interpretation: Low risk drinking behavior.    Single Item Drug Screening:  How often have you used an illegal drug (including marijuana) or a prescription medication for non-medical reasons in the past year? never    Single Item Drug Screen Score: 0  Interpretation: Negative screen for possible drug use  "disorder    No results found.     Physical Exam:     /74 (BP Location: Left arm, Patient Position: Sitting, Cuff Size: Standard)   Pulse 73   Temp 98.4 °F (36.9 °C) (Temporal)   Ht 5' 7\" (1.702 m)   Wt 59.2 kg (130 lb 9.6 oz)   SpO2 98%   BMI 20.45 kg/m²     Physical Exam     MARY ANN Sanders  "

## 2024-03-26 NOTE — PROGRESS NOTES
Assessment/Plan:    Mammogram order placed.  Advised about Shingrix vaccine.  Will trial trazodone for sleep.  Advised to call with update in 2 weeks.  Return for AWV.     1. Depression with anxiety  Stable with current management.  Continue Lexapro.    2. Insomnia, unspecified type  Will trial trazodone, advised patient she may try half a tablet 25 mg at bedtime to start and increase gradually as tolerated.  Call with update in 2 weeks.  - traZODone (DESYREL) 50 mg tablet; Take 1 tablet (50 mg total) by mouth daily at bedtime  Dispense: 30 tablet; Refill: 0    3. Thoracic aortic aneurysm without rupture, unspecified part (HCC)  Followed by Dr. Braun.    4. Encounter for screening mammogram for breast cancer  - Mammo screening bilateral w 3d & cad      Subjective:      Patient ID: Mojgan Lopez is a 64 y.o. female.    64-year-old female presents for 6-month follow-up.  She has depression and anxiety, currently on Lexapro.  She has a thoracic aortic aneurysm, following with Dr. Braun.  She is complaining of fatigue, reports chronic issues with insomnia.  She tells me she goes to bed around 9 PM however wakes up around 12 to 1 AM and is unable to get back to sleep for several hours and then is up early for work in the morning.  She tells me she feels as though this is related to anxiety however does not think she needs a medication adjustment with her Lexapro as her anxiety and depression seem well-controlled otherwise besides the insomnia.  She is agreeable to trying something at bedtime for sleep but is worried about being too tired in the morning therefore she will wait till the weekend to try a low-dose of medication.  She is overdue for her mammogram, agreeable.  She will return for her AWV.          The following portions of the patient's history were reviewed and updated as appropriate: allergies, current medications, past family history, past social history, past surgical history, and problem  "list.    Review of Systems   Constitutional:  Positive for fatigue.   HENT: Negative.     Eyes: Negative.    Respiratory: Negative.     Cardiovascular: Negative.    Gastrointestinal: Negative.    Endocrine: Negative.    Genitourinary: Negative.    Musculoskeletal: Negative.    Skin: Negative.    Allergic/Immunologic: Negative.    Neurological: Negative.    Hematological: Negative.    Psychiatric/Behavioral:  Positive for sleep disturbance.          Objective:      /74 (BP Location: Left arm, Patient Position: Sitting, Cuff Size: Standard)   Pulse 73   Temp 98.4 °F (36.9 °C) (Temporal)   Ht 5' 7\" (1.702 m)   Wt 59.2 kg (130 lb 9.6 oz)   SpO2 98%   BMI 20.45 kg/m²          Physical Exam  Vitals and nursing note reviewed.   Constitutional:       General: She is not in acute distress.     Appearance: Normal appearance. She is not ill-appearing.   HENT:      Head: Normocephalic and atraumatic.   Eyes:      General:         Right eye: No discharge.         Left eye: No discharge.      Conjunctiva/sclera: Conjunctivae normal.      Pupils: Pupils are equal, round, and reactive to light.   Cardiovascular:      Rate and Rhythm: Normal rate and regular rhythm.      Pulses: Normal pulses.      Heart sounds: Normal heart sounds. No murmur heard.  Pulmonary:      Effort: Pulmonary effort is normal.      Breath sounds: Normal breath sounds.   Musculoskeletal:         General: Normal range of motion.      Cervical back: Normal range of motion and neck supple.   Skin:     General: Skin is warm and dry.   Neurological:      General: No focal deficit present.      Mental Status: She is alert and oriented to person, place, and time. Mental status is at baseline.   Psychiatric:         Mood and Affect: Mood normal.         Behavior: Behavior normal.         Thought Content: Thought content normal.                    MARY ANN Sanders    "

## 2024-03-27 ENCOUNTER — TELEPHONE (OUTPATIENT)
Dept: OTHER | Facility: OTHER | Age: 64
End: 2024-03-27

## 2024-03-27 NOTE — TELEPHONE ENCOUNTER
Patient called saying that she had an appointment yesterday at the office and the person that check her in has her medicare card. Patient stated that she was making a copy of the card and when she is leaving the appointment she was supposed give her back her card but she did not give her back the card. Please advise.

## 2024-03-28 NOTE — TELEPHONE ENCOUNTER
Pt called states no got back to her yesterday. Relayed message, office tried to reach her and were unable to leave message. Card is in the cash drawer.     Pt will pass by today before closing to  card.    MEHUL

## 2024-03-28 NOTE — TELEPHONE ENCOUNTER
"Card is in our cash drawer.  Tried to call pt, and I got a recording stating \"the wireless caller is not available at this time, try call again later\".  "

## 2024-04-24 ENCOUNTER — TELEPHONE (OUTPATIENT)
Dept: FAMILY MEDICINE CLINIC | Facility: MEDICAL CENTER | Age: 64
End: 2024-04-24

## 2024-06-03 ENCOUNTER — HOSPITAL ENCOUNTER (OUTPATIENT)
Dept: RADIOLOGY | Facility: MEDICAL CENTER | Age: 64
Discharge: HOME | End: 2024-06-03
Payer: COMMERCIAL

## 2024-06-03 VITALS — BODY MASS INDEX: 20.4 KG/M2 | HEIGHT: 67 IN | WEIGHT: 130 LBS

## 2024-06-03 PROCEDURE — 77067 SCR MAMMO BI INCL CAD: CPT

## 2024-06-03 PROCEDURE — 77063 BREAST TOMOSYNTHESIS BI: CPT

## 2024-07-30 DIAGNOSIS — I71.21 ANEURYSM OF ASCENDING AORTA WITHOUT RUPTURE (HCC): Primary | ICD-10-CM

## 2024-07-31 ENCOUNTER — TELEPHONE (OUTPATIENT)
Dept: ENDOCRINOLOGY | Facility: CLINIC | Age: 64
End: 2024-07-31

## 2024-07-31 DIAGNOSIS — M81.0 AGE-RELATED OSTEOPOROSIS WITHOUT CURRENT PATHOLOGICAL FRACTURE: Primary | ICD-10-CM

## 2024-07-31 NOTE — TELEPHONE ENCOUNTER
Reclast was ordered in February   Can you please find out why patient was not scheduled?  Also please call insurance company to make sure her prior authorization is completed.  Please make appointment for Reclast infusion, order is in epic     Libia BravohmukhTiffany

## 2024-08-02 ENCOUNTER — TELEPHONE (OUTPATIENT)
Dept: INFUSION CENTER | Facility: CLINIC | Age: 64
End: 2024-08-02

## 2024-08-02 DIAGNOSIS — M81.0 AGE-RELATED OSTEOPOROSIS WITHOUT CURRENT PATHOLOGICAL FRACTURE: Primary | ICD-10-CM

## 2024-08-02 RX ORDER — SODIUM CHLORIDE 9 MG/ML
20 INJECTION, SOLUTION INTRAVENOUS ONCE
OUTPATIENT
Start: 2024-08-06

## 2024-08-02 RX ORDER — ZOLEDRONIC ACID 5 MG/100ML
5 INJECTION, SOLUTION INTRAVENOUS ONCE
OUTPATIENT
Start: 2024-08-06

## 2024-08-02 NOTE — TELEPHONE ENCOUNTER
New patient phone call completed. Voicemail left on patients cell phone. Date time and location of appointment confirmed. Informed patient to have blood work drawn Saturday or Monday for her infusion. Infusion center visitor policy reviewed. Call back number provided.

## 2024-08-03 ENCOUNTER — LAB (OUTPATIENT)
Dept: LAB | Facility: MEDICAL CENTER | Age: 64
End: 2024-08-03
Payer: COMMERCIAL

## 2024-08-03 DIAGNOSIS — M81.0 AGE-RELATED OSTEOPOROSIS WITHOUT CURRENT PATHOLOGICAL FRACTURE: ICD-10-CM

## 2024-08-03 DIAGNOSIS — E03.9 ACQUIRED HYPOTHYROIDISM: ICD-10-CM

## 2024-08-03 DIAGNOSIS — E55.9 VITAMIN D DEFICIENCY: ICD-10-CM

## 2024-08-03 LAB
25(OH)D3 SERPL-MCNC: 44.8 NG/ML (ref 30–100)
ALBUMIN SERPL BCG-MCNC: 3.8 G/DL (ref 3.5–5)
ALP SERPL-CCNC: 51 U/L (ref 34–104)
ALT SERPL W P-5'-P-CCNC: 16 U/L (ref 7–52)
ANION GAP SERPL CALCULATED.3IONS-SCNC: 8 MMOL/L (ref 4–13)
AST SERPL W P-5'-P-CCNC: 20 U/L (ref 13–39)
BILIRUB SERPL-MCNC: 0.32 MG/DL (ref 0.2–1)
BUN SERPL-MCNC: 18 MG/DL (ref 5–25)
CALCIUM SERPL-MCNC: 9 MG/DL (ref 8.4–10.2)
CHLORIDE SERPL-SCNC: 107 MMOL/L (ref 96–108)
CO2 SERPL-SCNC: 28 MMOL/L (ref 21–32)
CREAT SERPL-MCNC: 0.6 MG/DL (ref 0.6–1.3)
GFR SERPL CREATININE-BSD FRML MDRD: 95 ML/MIN/1.73SQ M
GLUCOSE P FAST SERPL-MCNC: 82 MG/DL (ref 65–99)
POTASSIUM SERPL-SCNC: 3.9 MMOL/L (ref 3.5–5.3)
PROT SERPL-MCNC: 6.5 G/DL (ref 6.4–8.4)
SODIUM SERPL-SCNC: 143 MMOL/L (ref 135–147)
T4 FREE SERPL-MCNC: 0.86 NG/DL (ref 0.61–1.12)
TSH SERPL DL<=0.05 MIU/L-ACNC: 2.25 UIU/ML (ref 0.45–4.5)

## 2024-08-03 PROCEDURE — 82523 COLLAGEN CROSSLINKS: CPT

## 2024-08-03 PROCEDURE — 80053 COMPREHEN METABOLIC PANEL: CPT

## 2024-08-03 PROCEDURE — 84439 ASSAY OF FREE THYROXINE: CPT

## 2024-08-03 PROCEDURE — 82306 VITAMIN D 25 HYDROXY: CPT

## 2024-08-03 PROCEDURE — 82570 ASSAY OF URINE CREATININE: CPT

## 2024-08-03 PROCEDURE — 84443 ASSAY THYROID STIM HORMONE: CPT

## 2024-08-03 PROCEDURE — 36415 COLL VENOUS BLD VENIPUNCTURE: CPT

## 2024-08-05 DIAGNOSIS — M81.0 AGE-RELATED OSTEOPOROSIS WITHOUT CURRENT PATHOLOGICAL FRACTURE: Primary | ICD-10-CM

## 2024-08-05 LAB
COLLAGEN NTX UR-SCNC: 767 NMOL BCE
COLLAGEN NTX/CREAT UR-SRTO: 45 NM BCE/MM CR (ref 0–89)
CREAT UR-MCNC: 194.5 MG/DL
INTERPRETIVE GUIDE:: NORMAL

## 2024-08-06 ENCOUNTER — HOSPITAL ENCOUNTER (OUTPATIENT)
Dept: INFUSION CENTER | Facility: CLINIC | Age: 64
Discharge: HOME | End: 2024-08-06
Payer: COMMERCIAL

## 2024-08-06 VITALS
SYSTOLIC BLOOD PRESSURE: 138 MMHG | TEMPERATURE: 97.7 F | DIASTOLIC BLOOD PRESSURE: 70 MMHG | OXYGEN SATURATION: 97 % | HEIGHT: 67 IN | WEIGHT: 130.5 LBS | HEART RATE: 67 BPM | RESPIRATION RATE: 18 BRPM | BODY MASS INDEX: 20.48 KG/M2

## 2024-08-06 DIAGNOSIS — M81.0 AGE-RELATED OSTEOPOROSIS WITHOUT CURRENT PATHOLOGICAL FRACTURE: Primary | ICD-10-CM

## 2024-08-06 PROCEDURE — 96374 THER/PROPH/DIAG INJ IV PUSH: CPT

## 2024-08-06 RX ORDER — SODIUM CHLORIDE 9 MG/ML
20 INJECTION, SOLUTION INTRAVENOUS ONCE
Status: COMPLETED | OUTPATIENT
Start: 2024-08-06 | End: 2024-08-06

## 2024-08-06 RX ORDER — SODIUM CHLORIDE 9 MG/ML
20 INJECTION, SOLUTION INTRAVENOUS ONCE
OUTPATIENT
Start: 2025-08-03

## 2024-08-06 RX ORDER — ZOLEDRONIC ACID 5 MG/100ML
5 INJECTION, SOLUTION INTRAVENOUS ONCE
Status: COMPLETED | OUTPATIENT
Start: 2024-08-06 | End: 2024-08-06

## 2024-08-06 RX ORDER — ZOLEDRONIC ACID 5 MG/100ML
5 INJECTION, SOLUTION INTRAVENOUS ONCE
OUTPATIENT
Start: 2025-08-03

## 2024-08-06 RX ADMIN — SODIUM CHLORIDE 20 ML/HR: 0.9 INJECTION, SOLUTION INTRAVENOUS at 09:12

## 2024-08-06 RX ADMIN — ZOLEDRONIC ACID 5 MG: 5 INJECTION, SOLUTION INTRAVENOUS at 09:11

## 2024-08-06 NOTE — PROGRESS NOTES
Patient presents to Infusion Center for Reclast infusion. PIV placed in L arm with positive blood return. Patient states she does not take Vit D or Calcium PO. CrCl reviewed- within parameters to treat. Calcium level 9.0 from labs drawn 8/3.

## 2024-08-06 NOTE — PROGRESS NOTES
Patient tolerated treatment without incident. No further appts scheduled at this time. Patient to follow up with provider to then schedule next appt at Infusion Center.

## 2024-08-12 NOTE — TELEPHONE ENCOUNTER
Endocrinology Clinic Note    Reason for Referral: Hypothyroidism  Referring Physician: Pcp, Misty    Kym Steele is here for consultation for hypothyroidism    Initial Consultation (2/28/2022):   Patient is a 44 year old female who presents here for consultation for hypothyroidism.    Patient was seen By Dr. Gee Sepulveda, last seen in 2/2021. At that time, she was having hair loss.   Patient's Synthroid was changed to levothyroxine and dose was decreased to 112mcg. Her hair loss improved with decrease in dose. However, her levothyroxine was changed back to Synthroid later by her ENT.        Patient reports that she was initially noted with goiter back in college and was started on thyroid meds. Unclear TSH at that time. Her mother has thyroid problem - hypothyroidism. She also has RA, psoriasis as well. Denies family history of thyroid cancer. Denies radiation to head/neck.      Patien was on biotin last year but is no longer on biotin. Denies use amiodarone/lithium. Denies iodine use (was taking at that time).   Denies having feeling sick recently. (Covid in November). Had covid again in early January as well.  In august, patient had swelling of left leg and had DVT. Was on OCP (Berna) at that time. Patient has been off of OCP - was started on it at age 18. Patient's period has been regular since coming off of OCP.      Patient is currently taking Synthroid 112mcg per day. Taking Synthroid on empty stomach. Works night (at least 30 minutes before first meal).   Takes calcium/iron - wait at least 4 hours before taking calcium/iron. Takes Vitamin D - 5000-79239 units/day. Denies forgetting to take Synthroid.     Patient reports that she has been gaining weight. Not eating well either - has been gaining. Has been on night shift since April. Denies any increasing neck size, changes in voice/dysphagia. Denies palpitation, diarrhea/constipation, tremor, depression/anxiety, heat/cold intolerance. Patient reports she can't  Left vm asking pt to call back  fall asleep due to having insomnia/switching from days/night shift.    Interval history 2/3/2023:  Component      Latest Ref Rng & Units 2/28/2022 5/18/2022 2/1/2023   TSH      0.350 - 5.000 mcUnits/mL 0.451 1.272 0.642   T4, FREE      0.8 - 1.5 ng/dL 1.3 1.3 1.2     Has been feeling ok. Can't get off night shift so has been struggling. Trying not to eat overnight but has been gaining weight.   Currently taking LT4 112mcg 6.5x/week (on empty stomach). Takes Vitamin D 10,000 units (~5x/week). Was working out at [employer], but had no time due to night shift.   Denies palpitation, diarrhea/constipation, tremor, depression/anxiety/insomnia. Patient's menstruation has been regular. Patient's last menstruation was 1/15/23.     Interval history 8/12/2024:    Component      Latest Ref Rng 6/26/2023  3:14 PM   TSH      0.350 - 5.000 mcUnits/mL 0.516        Component      Latest Ref Rng 8/12/2024  11:50 AM   TSH      0.350 - 5.000 mcUnits/mL 0.890      Patient is on LT4 100mcg 1x/day and liothyronine 2.5mcg 1x/day. Went through divorce. Went to day shift but not sleeping well and not losing weight. Reports feeling tired but think may be related to not being able to sleep possibly; sleeps only ~4 hours per night after changing shift. Reports having normal menstruation.   Taking bovine. Takes vitamin D 5000 units/day - missing 3-4x/week.       Past Medical History:  Past Medical History:   Diagnosis Date   • Breast cancer screening other than mammogram 08/27/2019    PAT (INTERMEDIATE RISK) 10YR RISK 2.2%, LIFETIME RISK 17%, BRCA 1/2 RISK 0.10% / 0.33%   • Family history of breast cancer 08/27/2019   • Hypothyroidism    • Psoriasis        Medications:  Current Outpatient Medications   Medication Sig   • ascorbic acid (Vitamin C) 250 MG tablet Take 250 mg by mouth daily.   • DISPENSE Bovine colostrum powder   • levothyroxine 88 MCG tablet Take 1 tablet by mouth daily.   • liothyronine (CYTOMEL) 5 MCG tablet Take 0.5 tablets by  mouth in the morning and 0.5 tablets in the evening.   • scopolamine (TRANSDERM_SCOP) 1 MG/3DAYS patch Place 1 patch onto the skin every 72 hours. As needed - motion sickness. (Patient not taking: Reported on 8/12/2024)   • VITAMIN D, CHOLECALCIFEROL, PO Take 5,000 Units by mouth.   • ZINC SULFATE PO Take by mouth daily.     No current facility-administered medications for this visit.           Allergies:  ALLERGIES:  No Known Allergies    Family History:  Family History   Problem Relation Age of Onset   • Thyroid Mother    • Psoriasis Mother    • Hypertension Mother    • Cancer Father         lung   • Hodgkin's Lymphoma Father    • Hypertension Father    • Atrial Fibrilliation Father    • Patient is unaware of any medical problems Brother    • Patient is unaware of any medical problems Daughter    • Patient is unaware of any medical problems Daughter    • Cancer, Breast Maternal Aunt         BRCA 1/2 GENE NEGATIVE   • Dementia/Alzheimers Maternal Grandmother    • Heart disease Maternal Grandmother    • Cancer, Breast Paternal Grandmother    • Cancer, Colon Paternal Grandfather        Social History:  Social History     Socioeconomic History   • Marital status: /Civil Union     Spouse name: Troy   • Number of children: 2   • Years of education: Not on file   • Highest education level: Not on file   Occupational History   • Occupation: RN      Comment: [employer]   Tobacco Use   • Smoking status: Never   • Smokeless tobacco: Never   Vaping Use   • Vaping status: never used   Substance and Sexual Activity   • Alcohol use: Yes   • Drug use: Never   • Sexual activity: Yes     Partners: Male     Birth control/protection: Pill   Other Topics Concern   •  Service Not Asked   • Blood Transfusions Not Asked   • Caffeine Concern Yes     Comment: 2- 3 per day   • Occupational Exposure Not Asked   • Hobby Hazards Not Asked   • Sleep Concern Not Asked   • Stress Concern Not Asked   • Weight Concern Not Asked    • Special Diet Not Asked   • Back Care Not Asked   • Exercise Yes     Comment: crossfit 3 x week but not for last few wks   • Bike Helmet Not Asked   • Seat Belt Yes   • Self-Exams No   Social History Narrative   • Not on file     Social Determinants of Health     Financial Resource Strain: Not on file   Food Insecurity: Not on file   Transportation Needs: Not on file   Physical Activity: Not on file   Stress: Not on file   Social Connections: Not on file   Interpersonal Safety: Not on file (7/21/2021)       Vital Signs reviewed (please see encounter vitals)  Visit Vitals  /72 (BP Location: LUE - Left upper extremity, Patient Position: Sitting, Cuff Size: Large Adult)   Pulse 64   Ht 5' 5\" (1.651 m)   Wt 90.2 kg (198 lb 14.4 oz)   LMP 07/24/2024 (Exact Date)   BMI 33.10 kg/m²       Physical Examination:   General: NAD, awake, alert and oriented  HEENT: Mucous membrane moist. EOMI. No lymphadenopathy appreciated.  Thyroid: ~15-20gm. Soft. Nontender. No distinct nodules appreciated.  CV: RRR. Nrl S1, S2. No murmurs.  Lungs: Clear to auscultation bilaterally  Extremities: No edema. 2+ pulses.   Skin: no rash, no acanthosis nigricans  Neurologic: normal gait/station, no tremor on outstretched hands  Psych: good eye contact, answers questions appropriately    Review of Data:  No results found for: \"HGBA1C\"  No results found for: \"CALCLDL\"  No results found for: \"HDL\"  No results found for: \"TRIGLYCERIDE\"  No results found for: \"CHOLESTEROL\"  HCT (%)   Date Value   07/22/2021 39.6       GPT/ALT (Units/L)   Date Value   07/22/2021 16       GOT/AST (Units/L)   Date Value   07/22/2021 12      No results found for: \"MALBCR\"    TSH (mcUnits/mL)   Date Value   08/12/2024 0.890       Vitamin D, 25-Hydroxy (ng/mL)   Date Value   06/26/2023 40.0     Creatinine (mg/dL)   Date Value   07/22/2021 0.90       Impressions & Recommendations:  Kym was seen today for office visit, follow-up and endocrine problem.    Diagnoses and  all orders for this visit:    Hypothyroidism, unspecified type  -     Cortisol; Future  -     levothyroxine 88 MCG tablet; Take 1 tablet by mouth daily.  -     liothyronine (CYTOMEL) 5 MCG tablet; Take 0.5 tablets by mouth in the morning and 0.5 tablets in the evening.  -     Thyroid Stimulating Hormone; Future    Other fatigue  -     Cortisol; Future  -     Thyroid Stimulating Hormone; Future      Patient is a 44 year old female who presents here for f/u of hypothyroidism.   Patient had changes in shift from night to day but has only been able to sleep 4 hours. Lost ~6 lbs since last visit. I think it is reasonable to try increased dose of liothyronine to see if she can feel better on that dose - with this, I would reduce LT4 to 88mcg 1x/day.     PLAN:  Will decrease LT4 to 88mcg and increase liothyronine to 2.5mcg 2x/day  Repeat TSH in 8 weeks  We'll add on cortisol  Advised trial of melatonin to help with sleep; 4 hours of sleep is certainly not adequate and she'll need ~7-8 hours of sleep  Advised against bovine supplements    RTC 1 year    I spent a total of 20 minutes on the day of the visit.  This includes pre-charting, chart review and documenting.      Carlos Garcia MD    CC: Pcp, No

## 2024-08-19 ENCOUNTER — TELEPHONE (OUTPATIENT)
Dept: ENDOCRINOLOGY | Facility: CLINIC | Age: 64
End: 2024-08-19

## 2024-08-19 ENCOUNTER — TELEPHONE (OUTPATIENT)
Age: 64
End: 2024-08-19

## 2024-08-19 DIAGNOSIS — E03.9 ACQUIRED HYPOTHYROIDISM: Primary | ICD-10-CM

## 2024-08-19 NOTE — TELEPHONE ENCOUNTER
Please order TSH, free T4, to do prior to next visit. If her appointment ends up being rescheduled because she is on the wait list to some time in the next 1-2 months, then she does not need to repeat labs.

## 2024-08-19 NOTE — TELEPHONE ENCOUNTER
Phone call from patient will be returning from vacation 8/26/24 - rescheduled follow up to January (added patient to wait list). Please inform patient if labs are needed.

## 2024-09-04 ENCOUNTER — HOSPITAL ENCOUNTER (OUTPATIENT)
Dept: RADIOLOGY | Facility: MEDICAL CENTER | Age: 64
Discharge: HOME | End: 2024-09-04
Payer: COMMERCIAL

## 2024-09-04 DIAGNOSIS — I71.21 ANEURYSM OF ASCENDING AORTA WITHOUT RUPTURE (HCC): ICD-10-CM

## 2024-09-04 PROCEDURE — 71250 CT THORAX DX C-: CPT

## 2024-09-07 DIAGNOSIS — F41.8 DEPRESSION WITH ANXIETY: ICD-10-CM

## 2024-09-07 RX ORDER — ESCITALOPRAM OXALATE 10 MG/1
10 TABLET ORAL DAILY
Qty: 90 TABLET | Refills: 1 | Status: SHIPPED | OUTPATIENT
Start: 2024-09-07

## 2024-09-13 ENCOUNTER — OFFICE VISIT (OUTPATIENT)
Dept: CARDIAC SURGERY | Facility: CLINIC | Age: 64
End: 2024-09-13
Payer: COMMERCIAL

## 2024-09-13 VITALS
DIASTOLIC BLOOD PRESSURE: 66 MMHG | BODY MASS INDEX: 20.42 KG/M2 | HEART RATE: 65 BPM | SYSTOLIC BLOOD PRESSURE: 142 MMHG | HEIGHT: 67 IN | OXYGEN SATURATION: 98 % | WEIGHT: 130.1 LBS

## 2024-09-13 DIAGNOSIS — I71.21 ANEURYSM OF ASCENDING AORTA WITHOUT RUPTURE (HCC): Primary | ICD-10-CM

## 2024-09-13 PROCEDURE — 99214 OFFICE O/P EST MOD 30 MIN: CPT | Performed by: THORACIC SURGERY (CARDIOTHORACIC VASCULAR SURGERY)

## 2024-09-13 NOTE — PROGRESS NOTES
"Aortic Surveillance - Cardiac Surgery   Mojgan Lopez 64 y.o. female MRN: [id number]    History of Present Illness: Mojgan Lopez is a 64 y.o. year old female who presents today for ongoing surveillance of previously identified ascending aortic aneurysm. They were most recently evaluated in our office with CT imaging in September 2022. At that time, she was having shortness of breath with exertion and she was referred to cardiology to rule out cardiac etiology. She saw Dr. Barron, and had an echo and exercise stress test. EST was negative for any perfusion defect or EKG changes. Echo demonstrated normal EF of 65%, trileaflet AV without AS or AI and trace mitral regurgitation. She saw Dr. Barron a couple more times, but was unhappy with her last interaction with him, so she has not followed up. Essentially there was no cardiac etiology for her shortness of breath.     Upon interview today, patient reports that she still has ongoing ALLISON with walking long distances and going up stairs. She also has occasional random sharp \"pricking\" pain in her chest. She denies lightheadedness, dizziness, back pain, syncope, LE edema, numbness/tingling/paresthesias. She works as an  at a nursing home, and is otherwise independent with all activities. FH notable for lung cancer in her dad and COPD in her mom. Her brother recently had a mitral valve replacement. She is a nonsmoker, drinks on rare occasions, and does not use recreational drugs.       Past Medical History:  Past Medical History:   Diagnosis Date    Anemia 8/17/2018    Celiac disease     Chiari I malformation (HCC)     Hyperlipemia     Nonmelanoma skin cancer     last assessed 02/19/2015    Squamous cell cancer of skin of ala nasi     2012    Thoracic aortic aneurysm without rupture (HCC) 8/17/2018    Thyroid disorder          Past Surgical History:   Past Surgical History:   Procedure Laterality Date    CHOLECYSTECTOMY      COLECTOMY ZEB      " COLONOSCOPY      resolved 2010    CORONARY ANGIOPLASTY WITH STENT PLACEMENT      celiac artery stent    HYSTERECTOMY  2001    LAPAROSCOPY      large intestine excision     OOPHORECTOMY Bilateral 2001    TRANSLUMINAL ANGIOPLASTY      intraoperative, renal artery          Family History:  Family History   Problem Relation Age of Onset    Arthritis Mother     COPD Mother     Rheum arthritis Mother     COPD Father     Rheumatic fever Father     Rheum arthritis Father     Hypertension Father     Lung cancer Father     Crohn's disease Family     Psoriasis Family     Lupus Family     Ulcerative colitis Family     Breast cancer Cousin         over 50    No Known Problems Sister     No Known Problems Daughter     No Known Problems Maternal Grandmother     No Known Problems Maternal Grandfather     No Known Problems Paternal Grandmother     No Known Problems Paternal Grandfather     No Known Problems Son     No Known Problems Brother     No Known Problems Brother     No Known Problems Brother     No Known Problems Brother     Cancer Paternal Aunt     Cancer Paternal Uncle          Social History:    Social History     Substance and Sexual Activity   Alcohol Use Yes    Comment: 1 drink per month max     Social History     Substance and Sexual Activity   Drug Use No     Social History     Tobacco Use   Smoking Status Never   Smokeless Tobacco Never       Home Medications:   Prior to Admission medications    Medication Sig Start Date End Date Taking? Authorizing Provider   acetaminophen (TYLENOL) 500 mg tablet Take 2 tablets by mouth daily as needed   Yes Historical Provider, MD   escitalopram (LEXAPRO) 10 mg tablet TAKE 1 TABLET BY MOUTH EVERY DAY 9/7/24  Yes MARY ANN Sadners   levothyroxine 88 mcg tablet Take 1 tablet (88 mcg total) by mouth daily Monday through Saturday and 1/2 tablet on Amadou 1/3/24  Yes Libia Cortez MD   Multiple Vitamins-Minerals (MULTIVITAMIN ADULT PO) Take 1 tablet by mouth daily   Yes  "Yanet Valladares MD   rosuvastatin (CRESTOR) 10 MG tablet TAKE 1 TABLET BY MOUTH EVERY DAY 3/12/24  Yes Emile Barron MD   Cholecalciferol (Vitamin D) 125 MCG (5000 UT) CAPS Take 6000 IU every other day 2/14/24   Libia Cortez MD   methocarbamol (ROBAXIN) 500 mg tablet Take 1 tablet (500 mg total) by mouth 2 (two) times a day as needed for muscle spasms 11/2/23   Jorge A Crawford DO   traZODone (DESYREL) 50 mg tablet Take 1 tablet (50 mg total) by mouth daily at bedtime 3/26/24   MARY ANN Sanders       Allergies:  Allergies   Allergen Reactions    Morphine And Codeine Headache     Category: Adverse Reaction;        Review of Systems:     Review of Systems   Constitutional:  Positive for fatigue. Negative for chills and fever.   HENT:  Negative for ear pain and sore throat.    Eyes:  Negative for pain and visual disturbance.   Respiratory:  Positive for shortness of breath. Negative for cough.    Cardiovascular:  Negative for chest pain and palpitations.   Gastrointestinal:  Negative for abdominal pain and vomiting.   Genitourinary:  Negative for dysuria and hematuria.   Musculoskeletal:  Negative for arthralgias and back pain.   Skin:  Negative for color change and rash.   Neurological:  Negative for seizures and syncope.   All other systems reviewed and are negative.      Vital Signs:     Vitals:    09/13/24 0817   BP: 142/66   BP Location: Left arm   Patient Position: Sitting   Cuff Size: Standard   Pulse: 65   SpO2: 98%   Weight: 59 kg (130 lb 1.6 oz)   Height: 5' 7\" (1.702 m)       Physical Exam:     Physical Exam  Vitals reviewed.   Constitutional:       Appearance: Normal appearance.   HENT:      Head: Normocephalic and atraumatic.   Eyes:      Pupils: Pupils are equal, round, and reactive to light.   Neck:      Vascular: No carotid bruit or JVD.   Cardiovascular:      Rate and Rhythm: Normal rate and regular rhythm.      Pulses:           Carotid pulses are 2+ on the right side and 2+ on the " "left side.       Radial pulses are 2+ on the right side and 2+ on the left side.        Dorsalis pedis pulses are 2+ on the right side and 2+ on the left side.      Heart sounds: Normal heart sounds. No murmur heard.     No friction rub. No gallop.   Pulmonary:      Effort: Pulmonary effort is normal.      Breath sounds: Normal breath sounds. No wheezing, rhonchi or rales.   Abdominal:      Palpations: Abdomen is soft.      Tenderness: There is no abdominal tenderness.   Musculoskeletal:      Cervical back: Normal range of motion.   Skin:     General: Skin is warm and dry.      Capillary Refill: Capillary refill takes less than 2 seconds.   Neurological:      General: No focal deficit present.      Mental Status: She is alert.      Sensory: Sensation is intact.   Psychiatric:         Attention and Perception: Attention normal.         Mood and Affect: Mood normal.         Behavior: Behavior normal. Behavior is cooperative.         Lab Results:               Invalid input(s): \"LABGLOM\"      No results found for: \"HGBA1C\"  Lab Results   Component Value Date    TROPONINI <0.03 07/22/2020       Imaging Studies:     CT Chest: 9/4/24  IMPRESSION:  1. Stable ascending thoracic aortic ectasia measuring 39 mm and descending thoracic aorta measuring 41 mm.  2. Stable aneurysmal dilatation of proximal abdominal aorta measuring 36 mm.    Echocardiogram: 11/14/22  Left Ventricle Left ventricular cavity size is normal. Wall thickness is normal. The left ventricular ejection fraction is 65% by single dimension measurement.  Wall motion is normal. Diastolic function is normal.   Right Ventricle Right ventricular cavity size is normal. Systolic function is normal. Normal tricuspid annular plane systolic excursion (TAPSE) > 1.7 cm.   Left Atrium The atrium is normal in size (16-34 mL/m2).   Right Atrium The atrium is normal in size.   Aortic Valve The aortic valve is trileaflet. The leaflets are not thickened. There is no evidence of " regurgitation. The aortic valve has no significant stenosis.   Mitral Valve Mitral valve structure is normal. There is trace regurgitation. There is no evidence of stenosis.   Tricuspid Valve The leaflets are not thickened. There is trace regurgitation. There is no evidence of stenosis. The estimated right ventricular systolic pressure is 18.00 mmHg.   Pulmonic Valve The pulmonic valve was not well visualized. There is trace regurgitation. There is no evidence of stenosis.   Ascending Aorta The aortic root is normal in size (3.5 cm). The ascending aorta is normal in size (3.4 cm).   IVC/SVC The right atrial pressure is estimated at 3.0 mmHg. The inferior vena cava is normal in size. Respirophasic changes were normal.       Personally reviewed the following image studies in PACS and associated radiology reports: CT chest. My interpretation of the radiology images/reports is: documented above.    Assessment:  Patient Active Problem List    Diagnosis Date Noted    Protein-calorie malnutrition, unspecified severity ([facility]) 03/26/2024    Insomnia 03/26/2024    Sacroiliitis ([facility]) 11/27/2023    S/P insertion of iliac artery stent 04/26/2021    Urinary symptom or sign 04/26/2021    Left lower quadrant abdominal pain 04/26/2021    Pain in left lumbar region of back 04/26/2021    Age-related osteoporosis without current pathological fracture 11/19/2020    Vitamin D deficiency 11/19/2020    Asthma 04/18/2020    Anemia 08/17/2018    Thoracic aortic aneurysm without rupture ([facility]) 08/17/2018    COPD (chronic obstructive pulmonary disease) ([facility]) 07/07/2016    Iron deficiency anemia 05/06/2016    Celiac disease 05/20/2013    Hypothyroidism 05/02/2012    Hyperlipidemia 05/02/2012    Depression with anxiety 05/02/2012     Ascending aortic aneurysm; Ongoing ascending aortic replacement workup  Descending aortic aneurysm; Ongoing TEVAR workup    Plan:     CT chest, without contrast performed prior to the visit today was reviewed.   Radiographic findings of ascending aorta and proximal descending thoracic aorta aneurysm without significant change (39 mm and 41 mm at it's greatest diameter), were confirmed and shared with the patient today.    cts surveillance plan: The aneurysm size remains unchanged, and does not meet surgical indications for intervention. Therefore follow-up monitoring will be the treatment plan.  Arrangements have been made for future surveillance to be completed with CT chest, without contrast in 2 Years.    Mojgan Lopez was comfortable with our recommendations, and their questions were answered to their satisfaction.  Thank you for allowing us to participate in the care of this patient.     Aortic Aneurysm Instructions were provided to the patient as follows:    1. No heavy sustained lifting which would require excessive straining  2. Maintain a controlled blood pressure with a goal of 120/80.  3. Follow up in Aortic Clinic as recommended with radiology follow up as instructed  4. Report to the ER or call 911 immediately with the following signs / symptoms: sudden onset of back pain, chest pain or shortness of breath.    The patient recently had a screening colonoscopy in 2019.  Therefore GI referral is not indicated at this time.     SIGNATURE: Viry Paulson PA-C  DATE: 09/13/24  TIME: 8:41 AM

## 2024-09-25 ENCOUNTER — OFFICE VISIT (OUTPATIENT)
Dept: FAMILY MEDICINE CLINIC | Facility: MEDICAL CENTER | Age: 64
End: 2024-09-25
Payer: COMMERCIAL

## 2024-09-25 VITALS
WEIGHT: 131 LBS | RESPIRATION RATE: 18 BRPM | TEMPERATURE: 97.5 F | DIASTOLIC BLOOD PRESSURE: 70 MMHG | HEART RATE: 74 BPM | BODY MASS INDEX: 20.56 KG/M2 | OXYGEN SATURATION: 99 % | HEIGHT: 67 IN | SYSTOLIC BLOOD PRESSURE: 130 MMHG

## 2024-09-25 DIAGNOSIS — R94.120 ABNORMAL HEARING SCREEN: ICD-10-CM

## 2024-09-25 DIAGNOSIS — H93.13 TINNITUS OF BOTH EARS: Primary | ICD-10-CM

## 2024-09-25 DIAGNOSIS — Z12.11 COLON CANCER SCREENING: ICD-10-CM

## 2024-09-25 PROCEDURE — 92551 PURE TONE HEARING TEST AIR: CPT

## 2024-09-25 PROCEDURE — 99213 OFFICE O/P EST LOW 20 MIN: CPT

## 2024-09-25 PROCEDURE — G2211 COMPLEX E/M VISIT ADD ON: HCPCS

## 2024-09-25 NOTE — PROGRESS NOTES
"Assessment/Plan:    Return next week for AWV.  Follow-up with ENT as discussed.  Referral placed for colonoscopy.     1. Tinnitus of both ears  Advised to try white noise.  Discussed that there is no cure for tinnitus.  Referral placed to ENT for further discussion.  - Ambulatory Referral to Otolaryngology; Future    2. Colon cancer screening  Due for colonoscopy, referral placed.  - Ambulatory Referral to Gastroenterology; Future      Subjective:      Patient ID: Mojgan Lopez is a 64 y.o. female.    64-year-old female presents with complaint of bilateral ringing in her ears, she reports this is chronic but has been worsening lately and also reports worse on the left.  She notices it more so at nighttime during sleep which causes sleep disturbance for her.  She has not tried white noise.  She does tell me that her father and older brother did have tinnitus and wore hearing aids for this.  She denies hearing loss or difficulty with hearing.  She offers no other concerns or complaints at this time.        The following portions of the patient's history were reviewed and updated as appropriate: allergies, current medications, past family history, past medical history, past surgical history, and problem list.    Review of Systems   Constitutional: Negative.    HENT:          Bilateral ear ringing   Eyes: Negative.    Respiratory: Negative.     Cardiovascular: Negative.    Gastrointestinal: Negative.    Endocrine: Negative.    Genitourinary: Negative.    Musculoskeletal: Negative.    Skin: Negative.    Allergic/Immunologic: Negative.    Neurological: Negative.    Hematological: Negative.    Psychiatric/Behavioral: Negative.           Objective:      /70 (BP Location: Left arm, Patient Position: Sitting, Cuff Size: Standard)   Pulse 74   Temp 97.5 °F (36.4 °C) (Temporal)   Resp 18   Ht 5' 7\" (1.702 m)   Wt 59.4 kg (131 lb)   SpO2 99%   BMI 20.52 kg/m²          Physical Exam  Vitals and nursing note " reviewed.   Constitutional:       General: She is not in acute distress.     Appearance: Normal appearance. She is not ill-appearing.   HENT:      Head: Normocephalic and atraumatic.      Right Ear: Tympanic membrane, ear canal and external ear normal. There is no impacted cerumen.      Left Ear: Tympanic membrane, ear canal and external ear normal. There is no impacted cerumen.   Cardiovascular:      Rate and Rhythm: Normal rate.   Pulmonary:      Effort: Pulmonary effort is normal.   Neurological:      General: No focal deficit present.      Mental Status: She is alert and oriented to person, place, and time. Mental status is at baseline.   Psychiatric:         Mood and Affect: Mood normal.         Behavior: Behavior normal.         Thought Content: Thought content normal.         Hearing Screening    500Hz 1000Hz 2000Hz   Right ear 30 30 30   Left ear 30 30           MARY ANN Sanders

## 2024-09-26 ENCOUNTER — RA CDI HCC (OUTPATIENT)
Dept: OTHER | Facility: HOSPITAL | Age: 64
End: 2024-09-26

## 2024-10-04 DIAGNOSIS — E03.9 ACQUIRED HYPOTHYROIDISM: ICD-10-CM

## 2024-10-04 RX ORDER — LEVOTHYROXINE SODIUM 88 UG/1
88 TABLET ORAL DAILY
Qty: 90 TABLET | Refills: 1 | Status: SHIPPED | OUTPATIENT
Start: 2024-10-04

## 2024-10-08 ENCOUNTER — TELEPHONE (OUTPATIENT)
Age: 64
End: 2024-10-08

## 2024-10-08 ENCOUNTER — PREP FOR PROCEDURE (OUTPATIENT)
Age: 64
End: 2024-10-08

## 2024-10-08 DIAGNOSIS — Z12.11 SCREENING FOR COLON CANCER: Primary | ICD-10-CM

## 2024-10-08 NOTE — TELEPHONE ENCOUNTER
10/08/24  Screened by: Vaishali Gudino MA    Referring Provider Dr. Rosas    Pre- Screening:     There is no height or weight on file to calculate BMI.  Has patient been referred for a routine screening Colonoscopy? yes  Is the patient between 45-75 years old? yes      Previous Colonoscopy yes   If yes:    Date: 5 years ago    Facility: Dr. Disla    Reason: clear/       SCHEDULING STAFF: If the patient is between 45yrs-49yrs, please advise patient to confirm benefits/coverage with their insurance company for a routine screening colonoscopy, some insurance carriers will only cover at 50yrs or older. If the patient is over 75years old, please schedule an office visit.     Does the patient want to see a Gastroenterologist prior to their procedure OR are they having any GI symptoms? no    Has the patient been hospitalized or had abdominal surgery in the past 6 months? no    Does the patient use supplemental oxygen? no    Does the patient take Coumadin, Lovenox, Plavix, Elliquis, Xarelto, or other blood thinning medication? no    Has the patient had a stroke, cardiac event, or stent placed in the past year? no    SCHEDULING STAFF: If patient answers NO to above questions, then schedule procedure. If patient answers YES to above questions, then schedule office appointment.     If patient is between 45yrs - 49yrs, please advise patient that we will have to confirm benefits & coverage with their insurance company for a routine screening colonoscopy.

## 2024-10-08 NOTE — LETTER
COLONOSCOPY  MIRALAX/Dulcolax Bowel Preparation Instructions    The OR/GI Lab will contact you the evening prior to your procedure with your exact arrival time.    Our practice requires a 1 week notice for any cancellations or rescheduling. We kindly ask that you immediately notify us of any changes including any new medications that are prescribed. Thank you for your cooperation.     WEEK BEFORE YOUR PROCEDURE:  Stop taking Iron tablets.  5 days prior, AVOID vegetables and fruits with skins or seeds, nuts, corn, popcorn and whole grain breads.   Purchase: One (1) 238-gram container of Miralax (polyethylene glycol 3350), four (4) 5 mg Dulcolax (bisacodyl) tablets, and one (1) 64-ounce bottle of Gatorade (sports drink) - no red, orange, or purple. These may be purchased at any pharmacy without a prescription. Generic products are permissible.   Arrange responsible transportation for day of the procedure.     DAY BEFORE THE PROCEDURE:   CLEAR liquids only for entire day prior. Nothing red, orange or purple.    You MAY have:                                                               Soda  Water  Broth Gatorade  Jello  Popsicles Coffee/tea without milk/creamer     YOU MAY NOT HAVE:  Solid foods   Milk and milk products    Juice with pulp    BOWEL PREPARATION:  Includes: One (1) 238-gram container of Miralax (polyethylene glycol 3350), four (4) 5 mg Dulcolax (bisacodyl) tablets, and one (1) 64-ounce bottle of Gatorade (sports drink).  Preparation may be refrigerated.  Entire bowel prep should be completed.     Afternoon before the procedure (2:00 pm - 5:00 pm):    Take two (2) 5 mg Dulcolax laxative tablets.     Evening before the procedure (6:00 pm):  Mix entire container of Miralax with one (1) 64-ounce bottle of Gatorade and shake until all medication is dissolved.   Begin drinking solution. Drink an eight (8) ounce cup every 10-15 minutes until you have consumed half (32 ounces) of the solution.   Refrigerate remaining solution.    Night before the procedure (8:00 pm):  Take two (2) 5 mg Dulcolax laxative tablets.     Beginning 5 hours before your procedure:  Drink the remaining amount of prepared solution (32 ounces).  Drink an eight (8) ounce cup every 10-15 minutes until you have consumed the remaining solution.     Bowel prep should be completed 4 hours prior to procedure time.    NOTHING TO EAT OR DRINK AFTER MIDNIGHT- EXCEPT FOR YOUR PREP    DAY OF THE PROCEDURE:  You may brush your teeth.  Leave all jewelry at home.  Please arrive for your procedure as indicated by the OR / GI Lab / Endoscopy Unit. The hospital will contact you the day before with your exact arrival time.   Make sure you have arranged ahead of time for a responsible adult (18 or older) to accompany and drive you home after the procedure.  Please discuss any transportation concerns with our staff prior to your procedure.    The effects of the anesthesia can persist for 24 hours.  After receiving the sedation, you must exercise caution before engaging in any activity that could harm yourself and others (such as driving a car).  Do not make any important decisions or do not drink any alcoholic beverages during this time period.  After your procedure, you may have anything you'd like to eat or drink.  You will probably want to start with something light.  Please include plenty of fluids.  Avoid items that cause gas such as sodas and salads.    SPECIAL INSTRUCTIONS:    For patients currently taking blood thinners and/or antiplatelet therapy our office will contact the prescribing provider.  Our office will contact you with any required changes to your medication regimen.     Blood thinner (i.e. - Coumadin, Pradaxa, Lovenox, Xarelto, Eliquis)  ?  Continue (Do Not Stop)  ? Stop______________for_____________days prior to the procedure.    Antiplatelet (i.e. - Plavix, Aggrenox, Effient, Brilinta)  ?  Continue (Do Not  Stop)  ? Stop______________for_____________days prior to the procedure.       Diabetes:   If you are Diabetic, please see separate Diabetic Instruction Sheet.          Prescribed medications:  Do not stop your aspirin, or any of your other medications (unless instructed otherwise).    Take the rest of your prescribed medications with small sips of water at least 2 hours prior to your procedure.      For any questions or concerns related to your bowel preparation or pre-procedure instructions, please contact our office at [phone].  Thank you for choosing [facility]!

## 2024-10-08 NOTE — TELEPHONE ENCOUNTER
Scheduled date of colonoscopy (as of today): 11-7-2024  Physician performing colonoscopy: Dr. Kimbrough   Location of colonoscopy: [facility]   Bowel prep reviewed with patient: dulcolax and miralax reviewed and sent via [employer]   Instructions reviewed with patient by: marcell   Clearances: n/a

## 2024-10-16 ENCOUNTER — OFFICE VISIT (OUTPATIENT)
Dept: FAMILY MEDICINE CLINIC | Facility: MEDICAL CENTER | Age: 64
End: 2024-10-16
Payer: COMMERCIAL

## 2024-10-16 VITALS
OXYGEN SATURATION: 98 % | HEART RATE: 73 BPM | HEIGHT: 67 IN | DIASTOLIC BLOOD PRESSURE: 72 MMHG | TEMPERATURE: 98.4 F | RESPIRATION RATE: 16 BRPM | WEIGHT: 131.4 LBS | BODY MASS INDEX: 20.62 KG/M2 | SYSTOLIC BLOOD PRESSURE: 120 MMHG

## 2024-10-16 DIAGNOSIS — F41.9 ANXIETY: ICD-10-CM

## 2024-10-16 DIAGNOSIS — E78.00 PURE HYPERCHOLESTEROLEMIA: ICD-10-CM

## 2024-10-16 DIAGNOSIS — Z00.00 WELCOME TO MEDICARE PREVENTIVE VISIT: Primary | ICD-10-CM

## 2024-10-16 DIAGNOSIS — Z23 ENCOUNTER FOR IMMUNIZATION: ICD-10-CM

## 2024-10-16 PROCEDURE — 99213 OFFICE O/P EST LOW 20 MIN: CPT

## 2024-10-16 PROCEDURE — G0008 ADMIN INFLUENZA VIRUS VAC: HCPCS

## 2024-10-16 PROCEDURE — 90662 IIV NO PRSV INCREASED AG IM: CPT

## 2024-10-16 PROCEDURE — G0403 EKG FOR INITIAL PREVENT EXAM: HCPCS

## 2024-10-16 PROCEDURE — G0402 INITIAL PREVENTIVE EXAM: HCPCS

## 2024-10-16 RX ORDER — LORAZEPAM 0.5 MG/1
0.5 TABLET ORAL EVERY 8 HOURS PRN
Qty: 20 TABLET | Refills: 0 | Status: SHIPPED | OUTPATIENT
Start: 2024-10-16

## 2024-10-16 NOTE — PATIENT INSTRUCTIONS
Medicare Preventive Visit Patient Instructions  Thank you for completing your Welcome to Medicare Visit or Medicare Annual Wellness Visit today. Your next wellness visit will be due in one year (10/17/2025).  The screening/preventive services that you may require over the next 5-10 years are detailed below. Some tests may not apply to you based off risk factors and/or age. Screening tests ordered at today's visit but not completed yet may show as past due. Also, please note that scanned in results may not display below.  Preventive Screenings:  Service Recommendations Previous Testing/Comments   Colorectal Cancer Screening  * Colonoscopy    * Fecal Occult Blood Test (FOBT)/Fecal Immunochemical Test (FIT)  * Fecal DNA/Cologuard Test  * Flexible Sigmoidoscopy Age: 45-75 years old   Colonoscopy: every 10 years (may be performed more frequently if at higher risk)  OR  FOBT/FIT: every 1 year  OR  Cologuard: every 3 years  OR  Sigmoidoscopy: every 5 years  Screening may be recommended earlier than age 45 if at higher risk for colorectal cancer. Also, an individualized decision between you and your healthcare provider will decide whether screening between the ages of 76-85 would be appropriate. Colonoscopy: 05/01/2019  FOBT/FIT: Not on file  Cologuard: Not on file  Sigmoidoscopy: Not on file    Screening Current     Breast Cancer Screening Age: 40+ years old  Frequency: every 1-2 years  Not required if history of left and right mastectomy Mammogram: 06/03/2024    Screening Current   Cervical Cancer Screening Between the ages of 21-29, pap smear recommended once every 3 years.   Between the ages of 30-65, can perform pap smear with HPV co-testing every 5 years.   Recommendations may differ for women with a history of total hysterectomy, cervical cancer, or abnormal pap smears in past. Pap Smear: Not on file    Screening Not Indicated   Hepatitis C Screening Once for adults born between 1945 and 1965  More frequently in  patients at high risk for Hepatitis C Hep C Antibody: 08/05/2023    Screening Current   Diabetes Screening 1-2 times per year if you're at risk for diabetes or have pre-diabetes Fasting glucose: 82 mg/dL (8/3/2024)  A1C: No results in last 5 years (No results in last 5 years)  Screening Current   Cholesterol Screening Once every 5 years if you don't have a lipid disorder. May order more often based on risk factors. Lipid panel: 12/19/2023    Screening Not Indicated  History Lipid Disorder     Other Preventive Screenings Covered by Medicare:  Abdominal Aortic Aneurysm (AAA) Screening: covered once if your at risk. You're considered to be at risk if you have a family history of AAA.  Lung Cancer Screening: covers low dose CT scan once per year if you meet all of the following conditions: (1) Age 55-77; (2) No signs or symptoms of lung cancer; (3) Current smoker or have quit smoking within the last 15 years; (4) You have a tobacco smoking history of at least 20 pack years (packs per day multiplied by number of years you smoked); (5) You get a written order from a healthcare provider.  Glaucoma Screening: covered annually if you're considered high risk: (1) You have diabetes OR (2) Family history of glaucoma OR (3)  aged 50 and older OR (4)  American aged 65 and older  Osteoporosis Screening: covered every 2 years if you meet one of the following conditions: (1) You're estrogen deficient and at risk for osteoporosis based off medical history and other findings; (2) Have a vertebral abnormality; (3) On glucocorticoid therapy for more than 3 months; (4) Have primary hyperparathyroidism; (5) On osteoporosis medications and need to assess response to drug therapy.   Last bone density test (DXA Scan): 06/29/2023.  HIV Screening: covered annually if you're between the age of 15-65. Also covered annually if you are younger than 15 and older than 65 with risk factors for HIV infection. For pregnant  patients, it is covered up to 3 times per pregnancy.    Immunizations:  Immunization Recommendations   Influenza Vaccine Annual influenza vaccination during flu season is recommended for all persons aged >= 6 months who do not have contraindications   Pneumococcal Vaccine   * Pneumococcal conjugate vaccine = PCV13 (Prevnar 13), PCV15 (Vaxneuvance), PCV20 (Prevnar 20)  * Pneumococcal polysaccharide vaccine = PPSV23 (Pneumovax) Adults 19-63 yo with certain risk factors or if 65+ yo  If never received any pneumonia vaccine: recommend Prevnar 20 (PCV20)  Give PCV20 if previously received 1 dose of PCV13 or PPSV23   Hepatitis B Vaccine 3 dose series if at intermediate or high risk (ex: diabetes, end stage renal disease, liver disease)   Respiratory syncytial virus (RSV) Vaccine - COVERED BY MEDICARE PART D  * RSVPreF3 (Arexvy) CDC recommends that adults 60 years of age and older may receive a single dose of RSV vaccine using shared clinical decision-making (SCDM)   Tetanus (Td) Vaccine - COST NOT COVERED BY MEDICARE PART B Following completion of primary series, a booster dose should be given every 10 years to maintain immunity against tetanus. Td may also be given as tetanus wound prophylaxis.   Tdap Vaccine - COST NOT COVERED BY MEDICARE PART B Recommended at least once for all adults. For pregnant patients, recommended with each pregnancy.   Shingles Vaccine (Shingrix) - COST NOT COVERED BY MEDICARE PART B  2 shot series recommended in those 19 years and older who have or will have weakened immune systems or those 50 years and older     Health Maintenance Due:      Topic Date Due   • Colorectal Cancer Screening  05/01/2024   • Breast Cancer Screening: Mammogram  06/03/2026   • DXA SCAN  02/15/2028   • HIV Screening  Completed   • Hepatitis C Screening  Completed     Immunizations Due:      Topic Date Due   • Influenza Vaccine (1) 09/01/2024   • COVID-19 Vaccine (5 - 2023-24 season) 09/01/2024     Advance Directives    What are advance directives?  Advance directives are legal documents that state your wishes and plans for medical care. These plans are made ahead of time in case you lose your ability to make decisions for yourself. Advance directives can apply to any medical decision, such as the treatments you want, and if you want to donate organs.   What are the types of advance directives?  There are many types of advance directives, and each state has rules about how to use them. You may choose a combination of any of the following:  Living will:  This is a written record of the treatment you want. You can also choose which treatments you do not want, which to limit, and which to stop at a certain time. This includes surgery, medicine, IV fluid, and tube feedings.   Durable power of  for healthcare (DPAHC):  This is a written record that states who you want to make healthcare choices for you when you are unable to make them for yourself. This person, called a proxy, is usually a family member or a friend. You may choose more than 1 proxy.  Do not resuscitate (DNR) order:  A DNR order is used in case your heart stops beating or you stop breathing. It is a request not to have certain forms of treatment, such as CPR. A DNR order may be included in other types of advance directives.  Medical directive:  This covers the care that you want if you are in a coma, near death, or unable to make decisions for yourself. You can list the treatments you want for each condition. Treatment may include pain medicine, surgery, blood transfusions, dialysis, IV or tube feedings, and a ventilator (breathing machine).  Values history:  This document has questions about your views, beliefs, and how you feel and think about life. This information can help others choose the care that you would choose.  Why are advance directives important?  An advance directive helps you control your care. Although spoken wishes may be used, it is better to  have your wishes written down. Spoken wishes can be misunderstood, or not followed. Treatments may be given even if you do not want them. An advance directive may make it easier for your family to make difficult choices about your care.       © Copyright [employer] 2018 Information is for End User's use only and may not be sold, redistributed or otherwise used for commercial purposes. All illustrations and images included in CareNotes® are the copyrighted property of [employer] Inc[employer] or [employer]

## 2024-10-16 NOTE — ASSESSMENT & PLAN NOTE
Continue rosuvastatin.  Lipid panel due within the next 2 months.    Orders:    Lipid panel; Future    Lipid panel

## 2024-10-16 NOTE — PROGRESS NOTES
Ambulatory Visit  Name: Mojgan Lopez      : 1960      MRN: [id number]  Encounter Provider: MARY ANN Sanders  Encounter Date: 10/16/2024   Encounter department: [facility]    Assessment & Plan  Welcome to Medicare preventive visit  Return for follow-up in 6 months, sooner if needed.  Colonoscopy scheduled for next month.  Mammogram up-to-date.  Advised about Shingrix vaccine.    Orders:    POCT ECG    Encounter for immunization    Orders:    influenza vaccine, high-dose, PF 0.5 mL (Fluzone High Dose)    Pure hypercholesterolemia  Continue rosuvastatin.  Lipid panel due within the next 2 months.    Orders:    Lipid panel; Future    Lipid panel    Anxiety  Continue Lexapro at current dose and may use Ativan as needed, prescription sent in as below.  Discussed potential for addiction with these types of medications and advised against excessive use.  Advised patient if she feels the need to use this medication more than once or twice per week she should let me know and we will discuss further anxiety management.    Orders:    LORazepam (ATIVAN) 0.5 mg tablet; Take 1 tablet (0.5 mg total) by mouth every 8 (eight) hours as needed for anxiety      Depression Screening and Follow-up Plan: Patient was screened for depression during today's encounter. They screened negative with a PHQ-9 score of 2.      Preventive health issues were discussed with patient, and age appropriate screening tests were ordered as noted in patient's After Visit Summary. Personalized health advice and appropriate referrals for health education or preventive services given if needed, as noted in patient's After Visit Summary.    History of Present Illness     64-year-old female presents for welcome to Medicare visit.  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 She feels as though her anxiety and depression is under stable control with Lexapro.  However, she reports increased anxiety and the need for possibly an as needed anxiety medication in which she can take once or twice per week if needed.   No other concerns at this time.  Agreeable to influenza vaccine.       Patient Care Team:  MARY ANN Sanders as PCP - General (Nurse Practitioner)  Mark Aguilera DO as PCP - PCP-[facility] ([facility]  MD JUAN Tucker MD Sherilyn T McCollum,     Review of Systems   Constitutional: Negative.    HENT: Negative.     Eyes: Negative.    Respiratory: Negative.     Cardiovascular: Negative.    Gastrointestinal: Negative.    Endocrine: Negative.    Genitourinary: Negative.    Musculoskeletal: Negative.    Skin: Negative.    Allergic/Immunologic: Negative.    Neurological: Negative.    Hematological: Negative.    Psychiatric/Behavioral:  The patient is nervous/anxious.      Medical History Reviewed by provider this encounter:  Tobacco  Allergies  Meds  Problems  Med Hx  Surg Hx  Fam Hx       Annual Wellness Visit Questionnaire   Mojgan is here for her Welcome to Medicare visit.     Health Risk Assessment:   Patient rates overall health as good. Patient feels that their physical health rating is same. Patient is satisfied with their life. Eyesight was rated as same. Hearing was rated as same. Patient feels that their emotional and mental health rating is same. Patients states they are sometimes angry. Patient states they are sometimes unusually tired/fatigued. Pain experienced in the last 7 days has been none. Patient states that she has experienced no weight loss or gain in last 6 months.     Depression Screening:   PHQ-9 Score: 2      Fall Risk Screening:   In the past year, patient has experienced: no history of falling in past year      Urinary  Incontinence Screening:   Patient has not leaked urine accidently in the last six months.     Home Safety:  Patient does not have trouble with stairs inside or outside of their home. Patient has working smoke alarms and has working carbon monoxide detector. Home safety hazards include: none.     Nutrition:   Current diet is Regular.     Medications:   Patient is currently taking over-the-counter supplements. OTC medications include: see medication list. Patient is able to manage medications.     Activities of Daily Living (ADLs)/Instrumental Activities of Daily Living (IADLs):   Walk and transfer into and out of bed and chair?: Yes  Dress and groom yourself?: Yes    Bathe or shower yourself?: Yes    Feed yourself? Yes  Do your laundry/housekeeping?: Yes  Manage your money, pay your bills and track your expenses?: Yes  Make your own meals?: Yes    Do your own shopping?: Yes    Durable Medical Equipment Suppliers  None    Previous Hospitalizations:   Any hospitalizations or ED visits within the last 12 months?: No      Advance Care Planning:   Living will: No    Durable POA for healthcare: No    Advanced directive: No      Comments: Blue packet provided.    Cognitive Screening:   Provider or family/friend/caregiver concerned regarding cognition?: No    PREVENTIVE SCREENINGS      Cardiovascular Screening:    General: Screening Not Indicated and History Lipid Disorder      Diabetes Screening:     General: Screening Current      Colorectal Cancer Screening:     General: Screening Current      Breast Cancer Screening:     General: Screening Current      Cervical Cancer Screening:    General: Screening Not Indicated      Osteoporosis Screening:    General: Screening Not Indicated and History Osteoporosis      Abdominal Aortic Aneurysm (AAA) Screening:        General: Screening Not Indicated and History AAA      Lung Cancer Screening:     General: Screening Not Indicated      Hepatitis C Screening:    General: Screening  Current    Screening, Brief Intervention, and Referral to Treatment (SBIRT)    Screening  Typical number of drinks in a day: 0  Typical number of drinks in a week: 0  Interpretation: Low risk drinking behavior.    AUDIT-C Screenin) How often did you have a drink containing alcohol in the past year? monthly or less  2) How many drinks did you have on a typical day when you were drinking in the past year? 0  3) How often did you have 6 or more drinks on one occasion in the past year? never    AUDIT-C Score: 1  Interpretation: Score 0-2 (female): Negative screen for alcohol misuse    Single Item Drug Screening:  How often have you used an illegal drug (including marijuana) or a prescription medication for non-medical reasons in the past year? never    Single Item Drug Screen Score: 0  Interpretation: Negative screen for possible drug use disorder    Other Counseling Topics:   Car/seat belt/driving safety, skin self-exam, sunscreen and calcium and vitamin D intake and regular weightbearing exercise.     Social Determinants of Health     Food Insecurity: No Food Insecurity (10/9/2024)    Hunger Vital Sign     Worried About Running Out of Food in the Last Year: Never true     Ran Out of Food in the Last Year: Never true   Transportation Needs: No Transportation Needs (10/9/2024)    PRAPARE - Transportation     Lack of Transportation (Medical): No     Lack of Transportation (Non-Medical): No   Housing Stability: Low Risk  (10/9/2024)    Housing Stability Vital Sign     Unable to Pay for Housing in the Last Year: No     Number of Times Moved in the Last Year: 0     Homeless in the Last Year: No   Utilities: Not At Risk (10/9/2024)    [facility] Utilities     Threatened with loss of utilities: No     Vision Screening    Right eye Left eye Both eyes   Without correction 20/25 20/25 20/25   With correction          Objective     /72 (BP Location: Left arm, Patient Position: Sitting, Cuff Size: Standard)   Pulse 73   Temp  "98.4 °F (36.9 °C) (Temporal)   Resp 16   Ht 5' 7\" (1.702 m)   Wt 59.6 kg (131 lb 6.4 oz)   SpO2 98%   BMI 20.58 kg/m²     Physical Exam  Vitals and nursing note reviewed.   Constitutional:       General: She is not in acute distress.     Appearance: Normal appearance. She is not ill-appearing.   HENT:      Head: Normocephalic and atraumatic.   Cardiovascular:      Rate and Rhythm: Normal rate and regular rhythm.      Pulses: Normal pulses.      Heart sounds: Normal heart sounds.   Pulmonary:      Effort: Pulmonary effort is normal.      Breath sounds: Normal breath sounds.   Skin:     General: Skin is warm and dry.   Neurological:      General: No focal deficit present.      Mental Status: She is alert and oriented to person, place, and time. Mental status is at baseline.   Psychiatric:         Mood and Affect: Mood normal.         Behavior: Behavior normal.         Thought Content: Thought content normal.       "

## 2024-11-07 ENCOUNTER — ANESTHESIA (OUTPATIENT)
Dept: GASTROENTEROLOGY | Facility: HOSPITAL | Age: 64
End: 2024-11-07
Payer: COMMERCIAL

## 2024-11-07 ENCOUNTER — ANESTHESIA EVENT (OUTPATIENT)
Dept: GASTROENTEROLOGY | Facility: HOSPITAL | Age: 64
End: 2024-11-07
Payer: COMMERCIAL

## 2024-11-07 ENCOUNTER — HOSPITAL ENCOUNTER (OUTPATIENT)
Dept: GASTROENTEROLOGY | Facility: HOSPITAL | Age: 64
Setting detail: OUTPATIENT SURGERY
End: 2024-11-07
Attending: INTERNAL MEDICINE
Payer: COMMERCIAL

## 2024-11-07 VITALS
RESPIRATION RATE: 20 BRPM | WEIGHT: 125 LBS | HEIGHT: 67 IN | BODY MASS INDEX: 19.62 KG/M2 | HEART RATE: 63 BPM | TEMPERATURE: 97.2 F | DIASTOLIC BLOOD PRESSURE: 61 MMHG | OXYGEN SATURATION: 98 % | SYSTOLIC BLOOD PRESSURE: 125 MMHG

## 2024-11-07 DIAGNOSIS — Z12.11 SCREENING FOR COLON CANCER: ICD-10-CM

## 2024-11-07 PROCEDURE — 88305 TISSUE EXAM BY PATHOLOGIST: CPT | Performed by: PATHOLOGY

## 2024-11-07 PROCEDURE — 45380 COLONOSCOPY AND BIOPSY: CPT | Performed by: INTERNAL MEDICINE

## 2024-11-07 RX ORDER — LIDOCAINE HYDROCHLORIDE 10 MG/ML
INJECTION, SOLUTION EPIDURAL; INFILTRATION; INTRACAUDAL; PERINEURAL AS NEEDED
Status: DISCONTINUED | OUTPATIENT
Start: 2024-11-07 | End: 2024-11-07

## 2024-11-07 RX ORDER — PROPOFOL 10 MG/ML
INJECTION, EMULSION INTRAVENOUS CONTINUOUS PRN
Status: DISCONTINUED | OUTPATIENT
Start: 2024-11-07 | End: 2024-11-07

## 2024-11-07 RX ORDER — ONDANSETRON 2 MG/ML
4 INJECTION INTRAMUSCULAR; INTRAVENOUS ONCE AS NEEDED
Status: CANCELLED | OUTPATIENT
Start: 2024-11-07

## 2024-11-07 RX ORDER — PROPOFOL 10 MG/ML
INJECTION, EMULSION INTRAVENOUS AS NEEDED
Status: DISCONTINUED | OUTPATIENT
Start: 2024-11-07 | End: 2024-11-07

## 2024-11-07 RX ORDER — SODIUM CHLORIDE, SODIUM LACTATE, POTASSIUM CHLORIDE, CALCIUM CHLORIDE 600; 310; 30; 20 MG/100ML; MG/100ML; MG/100ML; MG/100ML
125 INJECTION, SOLUTION INTRAVENOUS CONTINUOUS
Status: DISCONTINUED | OUTPATIENT
Start: 2024-11-07 | End: 2024-11-11 | Stop reason: HOSPADM

## 2024-11-07 RX ORDER — DIPHENHYDRAMINE HYDROCHLORIDE 50 MG/ML
12.5 INJECTION INTRAMUSCULAR; INTRAVENOUS ONCE AS NEEDED
Status: CANCELLED | OUTPATIENT
Start: 2024-11-07

## 2024-11-07 RX ORDER — LIDOCAINE HYDROCHLORIDE 10 MG/ML
0.5 INJECTION, SOLUTION EPIDURAL; INFILTRATION; INTRACAUDAL; PERINEURAL ONCE AS NEEDED
Status: DISCONTINUED | OUTPATIENT
Start: 2024-11-07 | End: 2024-11-11 | Stop reason: HOSPADM

## 2024-11-07 RX ORDER — METOCLOPRAMIDE HYDROCHLORIDE 5 MG/ML
10 INJECTION INTRAMUSCULAR; INTRAVENOUS ONCE AS NEEDED
Status: CANCELLED | OUTPATIENT
Start: 2024-11-07

## 2024-11-07 RX ADMIN — PROPOFOL 100 MCG/KG/MIN: 10 INJECTION, EMULSION INTRAVENOUS at 11:12

## 2024-11-07 RX ADMIN — PROPOFOL 40 MG: 10 INJECTION, EMULSION INTRAVENOUS at 11:12

## 2024-11-07 RX ADMIN — SODIUM CHLORIDE, SODIUM LACTATE, POTASSIUM CHLORIDE, AND CALCIUM CHLORIDE: .6; .31; .03; .02 INJECTION, SOLUTION INTRAVENOUS at 11:09

## 2024-11-07 RX ADMIN — LIDOCAINE HYDROCHLORIDE 50 MG: 10 INJECTION, SOLUTION EPIDURAL; INFILTRATION; INTRACAUDAL at 11:12

## 2024-11-07 NOTE — ANESTHESIA PREPROCEDURE EVALUATION
Procedure:  COLONOSCOPY    Relevant Problems   ANESTHESIA (within normal limits)      CARDIO   (+) Hyperlipidemia   (+) Thoracic aortic aneurysm without rupture (HCC)      ENDO   (+) Hypothyroidism      GI/HEPATIC (within normal limits)      /RENAL (within normal limits)      GYN (within normal limits)      HEMATOLOGY   (+) Anemia   (+) Iron deficiency anemia      MUSCULOSKELETAL   (+) Pain in left lumbar region of back      NEURO/PSYCH   (+) Depression with anxiety      PULMONARY   (+) Asthma   (+) COPD (chronic obstructive pulmonary disease) (HCC)      Surgery/Wound/Pain   (+) S/P insertion of iliac artery stent      Orthopedic/Musculoskeletal   (+) Sacroiliitis (HCC)      FEN/Gastrointestinal   (+) Celiac disease        Physical Exam    Airway    Mallampati score: II  TM Distance: >3 FB  Neck ROM: full     Dental   No notable dental hx     Cardiovascular  Rhythm: regular, Rate: normal, Cardiovascular exam normal    Pulmonary  Pulmonary exam normal Breath sounds clear to auscultation    Other Findings  post-pubertal.      Anesthesia Plan  ASA Score- 2     Anesthesia Type- IV sedation with anesthesia with ASA Monitors.         Additional Monitors:     Airway Plan:            Plan Factors-Exercise tolerance (METS): >4 METS.    Chart reviewed. EKG reviewed. Imaging results reviewed. Existing labs reviewed. Patient summary reviewed.                  Induction- intravenous.    Postoperative Plan-     Perioperative Resuscitation Plan - Level 1 - Full Code.       Informed Consent- Anesthetic plan and risks discussed with patient.  I personally reviewed this patient with the CRNA. Discussed and agreed on the Anesthesia Plan with the CRNA..

## 2024-11-07 NOTE — H&P
History and Physical - [facility]  Mojgan Lopez 64 y.o. female MRN: [id number]                  HPI: Mojgan Lopez is a 64 y.o. year old female who presents for colonoscopy surveillence      REVIEW OF SYSTEMS: Per the HPI, and otherwise unremarkable.    Historical Information   Past Medical History:   Diagnosis Date    Anemia 8/17/2018    Celiac disease     Chiari I malformation (HCC)     Hyperlipemia     Nonmelanoma skin cancer     last assessed 02/19/2015    Squamous cell cancer of skin of ala nasi     2012    Thoracic aortic aneurysm without rupture (HCC) 8/17/2018    Thyroid disorder      Past Surgical History:   Procedure Laterality Date    CHOLECYSTECTOMY      COLECTOMY ZEB      COLONOSCOPY      resolved 2010    CORONARY ANGIOPLASTY WITH STENT PLACEMENT      celiac artery stent    HYSTERECTOMY  2001    LAPAROSCOPY      large intestine excision     OOPHORECTOMY Bilateral 2001    TRANSLUMINAL ANGIOPLASTY      intraoperative, renal artery      Social History   Social History     Substance and Sexual Activity   Alcohol Use Yes    Comment: 1 drink per month max     Social History     Substance and Sexual Activity   Drug Use No     Social History     Tobacco Use   Smoking Status Never   Smokeless Tobacco Never     Family History   Problem Relation Age of Onset    Arthritis Mother     COPD Mother     Rheum arthritis Mother     COPD Father     Rheumatic fever Father     Rheum arthritis Father     Hypertension Father     Lung cancer Father     Crohn's disease Family     Psoriasis Family     Lupus Family     Ulcerative colitis Family     Breast cancer Cousin         over 50    No Known Problems Sister     No Known Problems Daughter     No Known Problems Maternal Grandmother     No Known Problems Maternal Grandfather     No Known Problems Paternal Grandmother     No Known Problems Paternal Grandfather     No Known Problems Son     No Known Problems Brother     No Known Problems Brother   "   No Known Problems Brother     No Known Problems Brother     Cancer Paternal Aunt     Cancer Paternal Uncle        Meds/Allergies       Current Outpatient Medications:     acetaminophen (TYLENOL) 500 mg tablet    escitalopram (LEXAPRO) 10 mg tablet    levothyroxine 88 mcg tablet    LORazepam (ATIVAN) 0.5 mg tablet    Multiple Vitamins-Minerals (MULTIVITAMIN ADULT PO)    rosuvastatin (CRESTOR) 10 MG tablet    Current Facility-Administered Medications:     lactated ringers infusion, 125 mL/hr, Intravenous, Continuous    lidocaine (PF) (XYLOCAINE-MPF) 1 % injection 0.5 mL, 0.5 mL, Infiltration, Once PRN    Allergies   Allergen Reactions    Morphine And Codeine Headache       Objective     /60   Pulse 65   Temp 97.6 °F (36.4 °C) (Temporal)   Resp 18   Ht 5' 7\" (1.702 m)   Wt 56.7 kg (125 lb)   SpO2 99%   BMI 19.58 kg/m²       PHYSICAL EXAM    Gen: NAD  Head: NCAT  CV: RRR  CHEST: Clear  ABD: soft, NT/ND  EXT: no edema      ASSESSMENT/PLAN:  This is a 64 y.o. year old female here for colonoscopy, and she is stable and optimized for her procedure.        "

## 2024-11-07 NOTE — ANESTHESIA POSTPROCEDURE EVALUATION
Post-Op Assessment Note    CV Status:  Stable    Pain management: adequate       Mental Status:  Alert and awake   Hydration Status:  Euvolemic   PONV Controlled:  Controlled   Airway Patency:  Patent     Post Op Vitals Reviewed: Yes    No anethesia notable event occurred.    Staff: CRNA           Last Filed PACU Vitals:  Vitals Value Taken Time   Temp 97    Pulse 70    /51    Resp 15    SpO2 99        Modified Umer:  Activity: 2 (11/7/2024 10:39 AM)  Respiration: 2 (11/7/2024 10:39 AM)  Circulation: 2 (11/7/2024 10:39 AM)  Consciousness: 2 (11/7/2024 10:39 AM)  Oxygen Saturation: 2 (11/7/2024 10:39 AM)  Modified Umer Score: 10 (11/7/2024 10:39 AM)

## 2024-11-11 ENCOUNTER — TELEPHONE (OUTPATIENT)
Dept: GASTROENTEROLOGY | Facility: CLINIC | Age: 64
End: 2024-11-11

## 2024-11-11 PROCEDURE — 88305 TISSUE EXAM BY PATHOLOGIST: CPT | Performed by: PATHOLOGY

## 2024-11-11 NOTE — TELEPHONE ENCOUNTER
----- Message from Curt Kimbrough MD sent at 11/11/2024 12:52 PM EST -----  Biopsy consistent with thrombosed hemorrhoid.

## 2024-11-13 ENCOUNTER — RESULTS FOLLOW-UP (OUTPATIENT)
Dept: GASTROENTEROLOGY | Facility: CLINIC | Age: 64
End: 2024-11-13

## 2024-11-18 NOTE — TELEPHONE ENCOUNTER
SPOKE WITH PT, INFORMED OF RESULTS PER PROVIDER. PT INQUIRING IF PROVIDER HAS ANY ADDITIONAL RECOMMENDATIONS FOR HER HEMORRHOIDS.

## 2024-11-19 LAB
CHOLEST SERPL-MCNC: 187 MG/DL
CHOLEST/HDLC SERPL: 2.6 (CALC)
HDLC SERPL-MCNC: 73 MG/DL
LDLC SERPL CALC-MCNC: 100 MG/DL (CALC)
NONHDLC SERPL-MCNC: 114 MG/DL (CALC)
TRIGL SERPL-MCNC: 47 MG/DL

## 2024-11-20 ENCOUNTER — RESULTS FOLLOW-UP (OUTPATIENT)
Dept: FAMILY MEDICINE CLINIC | Facility: MEDICAL CENTER | Age: 64
End: 2024-11-20

## 2025-01-21 DIAGNOSIS — M81.0 AGE-RELATED OSTEOPOROSIS WITHOUT CURRENT PATHOLOGICAL FRACTURE: Primary | ICD-10-CM

## 2025-01-21 RX ORDER — SODIUM CHLORIDE 9 MG/ML
20 INJECTION, SOLUTION INTRAVENOUS ONCE
OUTPATIENT
Start: 2025-01-21

## 2025-01-21 RX ORDER — ZOLEDRONIC ACID 0.05 MG/ML
5 INJECTION, SOLUTION INTRAVENOUS ONCE
OUTPATIENT
Start: 2025-01-21

## 2025-01-24 ENCOUNTER — APPOINTMENT (OUTPATIENT)
Dept: LAB | Facility: MEDICAL CENTER | Age: 65
End: 2025-01-24
Payer: COMMERCIAL

## 2025-01-24 DIAGNOSIS — E03.9 ACQUIRED HYPOTHYROIDISM: ICD-10-CM

## 2025-01-24 LAB
T4 FREE SERPL-MCNC: 1.18 NG/DL (ref 0.61–1.12)
TSH SERPL DL<=0.05 MIU/L-ACNC: 0.27 UIU/ML (ref 0.45–4.5)

## 2025-01-24 PROCEDURE — 84439 ASSAY OF FREE THYROXINE: CPT

## 2025-01-24 PROCEDURE — 84443 ASSAY THYROID STIM HORMONE: CPT

## 2025-01-24 PROCEDURE — 36415 COLL VENOUS BLD VENIPUNCTURE: CPT

## 2025-01-27 ENCOUNTER — RESULTS FOLLOW-UP (OUTPATIENT)
Dept: ENDOCRINOLOGY | Facility: CLINIC | Age: 65
End: 2025-01-27

## 2025-01-28 ENCOUNTER — OFFICE VISIT (OUTPATIENT)
Dept: ENDOCRINOLOGY | Facility: CLINIC | Age: 65
End: 2025-01-28
Payer: COMMERCIAL

## 2025-01-28 VITALS
HEART RATE: 71 BPM | DIASTOLIC BLOOD PRESSURE: 70 MMHG | BODY MASS INDEX: 20.4 KG/M2 | OXYGEN SATURATION: 97 % | WEIGHT: 130 LBS | SYSTOLIC BLOOD PRESSURE: 118 MMHG | HEIGHT: 67 IN

## 2025-01-28 DIAGNOSIS — E55.9 VITAMIN D DEFICIENCY: ICD-10-CM

## 2025-01-28 DIAGNOSIS — M81.0 AGE-RELATED OSTEOPOROSIS WITHOUT CURRENT PATHOLOGICAL FRACTURE: Primary | ICD-10-CM

## 2025-01-28 DIAGNOSIS — E03.9 ACQUIRED HYPOTHYROIDISM: ICD-10-CM

## 2025-01-28 PROCEDURE — 99214 OFFICE O/P EST MOD 30 MIN: CPT | Performed by: PHYSICIAN ASSISTANT

## 2025-01-28 RX ORDER — LEVOTHYROXINE SODIUM 75 UG/1
75 TABLET ORAL DAILY
Qty: 90 TABLET | Refills: 1 | Status: SHIPPED | OUTPATIENT
Start: 2025-01-28

## 2025-01-28 NOTE — PROGRESS NOTES
Patient Progress Note    CC: osteoporosis, hypothyroidism    Referring Provider  No referring provider defined for this encounter.     History of Present Illness:     Patient is 65 year old female here for follow-up of hypothyroidism and osteoporosis. She is currently on levothyroxine 88 mcg 1 tablet daily Monday through Saturday and half tablet on Sunday. Patient is taking it 1 hr before breakfast on an empty stomach and at least 4 hrs apart from supplements. Tolerating medication well. No history of external radiation to head/neck/chest. No family history of thyroid cancer. No recent Iodine loading in form of medication, biotin or kelp supplements or radiological diagnostic studies. Most recent TFTs were abnormal, TSH 0.274 and free T4 1.18.   Her DEXA scan done in February 2023 was consistent with osteoporosis of the lumbar spine.  At the last visit treatment was recommended and she completed her first Reclast infusion in August 2024.  She is currently taking a multivitamin. She has stopped the vitamin D since the last visit because she thought she was told to stop taking it.  Vitamin D was previously normal at 44.8. No recent falls or fractures.         Patient Active Problem List   Diagnosis    Anemia    Thoracic aortic aneurysm without rupture (HCC)    Celiac disease    Iron deficiency anemia    Hypothyroidism    Hyperlipidemia    Age-related osteoporosis without current pathological fracture    Vitamin D deficiency    S/P insertion of iliac artery stent    Urinary symptom or sign    Left lower quadrant abdominal pain    Pain in left lumbar region of back    Asthma    Depression with anxiety    COPD (chronic obstructive pulmonary disease) (HCC)    Sacroiliitis (HCC)    Protein-calorie malnutrition, unspecified severity (HCC)    Insomnia     Past Medical History:   Diagnosis Date    Anemia 8/17/2018    Celiac disease     Chiari I malformation (HCC)     Hyperlipemia     Nonmelanoma skin cancer     last  assessed 02/19/2015    Squamous cell cancer of skin of ala johnnyi     2012    Thoracic aortic aneurysm without rupture (HCC) 8/17/2018    Thyroid disorder       Past Surgical History:   Procedure Laterality Date    CHOLECYSTECTOMY      COLECTOMY ZEB      COLONOSCOPY      resolved 2010    CORONARY ANGIOPLASTY WITH STENT PLACEMENT      celiac artery stent    HYSTERECTOMY  2001    LAPAROSCOPY      large intestine excision     OOPHORECTOMY Bilateral 2001    TRANSLUMINAL ANGIOPLASTY      intraoperative, renal artery       Family History   Problem Relation Age of Onset    Arthritis Mother     COPD Mother     Rheum arthritis Mother     COPD Father     Rheumatic fever Father     Rheum arthritis Father     Hypertension Father     Lung cancer Father     Crohn's disease Family     Psoriasis Family     Lupus Family     Ulcerative colitis Family     Breast cancer Cousin         over 50    No Known Problems Sister     No Known Problems Daughter     No Known Problems Maternal Grandmother     No Known Problems Maternal Grandfather     No Known Problems Paternal Grandmother     No Known Problems Paternal Grandfather     No Known Problems Son     No Known Problems Brother     No Known Problems Brother     No Known Problems Brother     No Known Problems Brother     Cancer Paternal Aunt     Cancer Paternal Uncle      Social History     Tobacco Use    Smoking status: Never    Smokeless tobacco: Never   Substance Use Topics    Alcohol use: Yes     Comment: 1 drink per month max     Allergies   Allergen Reactions    Morphine And Codeine Headache     Current Outpatient Medications   Medication Sig Dispense Refill    acetaminophen (TYLENOL) 500 mg tablet Take 2 tablets by mouth daily as needed      escitalopram (LEXAPRO) 10 mg tablet TAKE 1 TABLET BY MOUTH EVERY DAY 90 tablet 1    levothyroxine 75 mcg tablet Take 1 tablet (75 mcg total) by mouth daily 90 tablet 1    LORazepam (ATIVAN) 0.5 mg tablet Take 1 tablet (0.5 mg total) by mouth every  "8 (eight) hours as needed for anxiety 20 tablet 0    Multiple Vitamins-Minerals (MULTIVITAMIN ADULT PO) Take 1 tablet by mouth daily      rosuvastatin (CRESTOR) 10 MG tablet TAKE 1 TABLET BY MOUTH EVERY DAY 90 tablet 3     No current facility-administered medications for this visit.         Review of Systems   Constitutional:  Positive for fatigue. Negative for activity change, appetite change and unexpected weight change.   HENT:  Negative for trouble swallowing.    Eyes:  Negative for visual disturbance.   Respiratory:  Negative for shortness of breath.    Cardiovascular:  Negative for chest pain and palpitations.   Gastrointestinal:  Negative for constipation and diarrhea.   Endocrine: Positive for cold intolerance. Negative for heat intolerance.   Musculoskeletal:  Positive for arthralgias.   Neurological:  Negative for tremors.   Psychiatric/Behavioral:  The patient is nervous/anxious (controlled).        Physical Exam:  Body mass index is 20.36 kg/m².  /70   Pulse 71   Ht 5' 7\" (1.702 m)   Wt 59 kg (130 lb)   SpO2 97%   BMI 20.36 kg/m²    Wt Readings from Last 3 Encounters:   01/28/25 59 kg (130 lb)   12/11/24 59 kg (130 lb)   11/07/24 56.7 kg (125 lb)       Physical Exam  Vitals and nursing note reviewed.   Constitutional:       Appearance: She is well-developed.   HENT:      Head: Normocephalic.   Eyes:      General: No scleral icterus.  Neck:      Thyroid: No thyromegaly.   Cardiovascular:      Rate and Rhythm: Normal rate and regular rhythm.      Pulses:           Radial pulses are 2+ on the right side and 2+ on the left side.      Heart sounds: No murmur heard.  Pulmonary:      Effort: Pulmonary effort is normal. No respiratory distress.      Breath sounds: Normal breath sounds. No wheezing.   Musculoskeletal:      Cervical back: Neck supple.   Skin:     General: Skin is warm and dry.   Neurological:      Mental Status: She is alert.      Deep Tendon Reflexes: Reflexes are normal and symmetric. " "          Labs:   No results found for: \"HGBA1C\"    Lab Results   Component Value Date    CHOL 211 (H) 09/09/2017    HDL 73 11/18/2024    TRIG 47 11/18/2024       Lab Results   Component Value Date    CALCIUM 9.0 08/03/2024     09/09/2017    K 3.9 08/03/2024    CO2 28 08/03/2024     08/03/2024    BUN 18 08/03/2024    CREATININE 0.60 08/03/2024        GFR, Calculated   Date Value Ref Range Status   07/22/2020 103 >60 mL/min/1.73m2 Final     Comment:     mL/min per 1.73 square meters                                            Normal Function or Mild Renal    Disease (if clinically at risk):  >or=60  Moderately Decreased:                30-59  Severely Decreased:                  15-29  Renal Failure:                         <15                                            -American GFR: multiply reported GFR by 1.16    Please note that the eGFR is based on the CKD-EPI calculation, and is not intended to be used for drug dosing.     eGFR   Date Value Ref Range Status   08/03/2024 95 ml/min/1.73sq m Final       Lab Results   Component Value Date    ALT 16 08/03/2024    AST 20 08/03/2024    ALKPHOS 51 08/03/2024    BILITOT 0.4 09/09/2017       Lab Results   Component Value Date    VHC1KDOYXLVP 0.274 (L) 01/24/2025    TSH 2.27 06/10/2023         Plan:    Diagnoses and all orders for this visit:    Age-related osteoporosis without current pathological fracture  Received first Reclast in Aug 2024. Repeat again this year Aug 2025.   Continue multivitamin  May need to restart vitamin D supplementation after checking level   Take precautions to avoid falls  Do weight bearing exercises as tolerated  DEXA scan due in Feb 2025  -     DXA bone density spine hip and pelvis; Future  -     Basic metabolic panel; Future  -     Basic metabolic panel    Vitamin D deficiency  Vitamin D was previously normal at 44.8.  Not currently on supplement  Check level now   -     Vitamin D 25 hydroxy; Future  -     Vitamin D 25 " hydroxy    Acquired hypothyroidism  TSH 0.274 and free T4 1.18.   Decrease levothyroxine to 75 mcg daily  Repeat TFTs in 6 weeks and prior to next visit  -     T4, free; Future  -     TSH, 3rd generation; Future  -     levothyroxine 75 mcg tablet; Take 1 tablet (75 mcg total) by mouth daily  -     TSH + Free T4; Future          Discussed with the patient and all questions fully answered. She will call me if any problems arise.    Counseled patient on diagnostic results, prognosis, risk and benefit of treatment options, instruction for management, importance of treatment compliance, risk  factor reduction and impressions      Tamiko Martin PA-C

## 2025-01-29 ENCOUNTER — APPOINTMENT (OUTPATIENT)
Dept: LAB | Facility: MEDICAL CENTER | Age: 65
End: 2025-01-29
Payer: COMMERCIAL

## 2025-01-29 DIAGNOSIS — M81.0 AGE-RELATED OSTEOPOROSIS WITHOUT CURRENT PATHOLOGICAL FRACTURE: Primary | ICD-10-CM

## 2025-01-29 PROCEDURE — 82306 VITAMIN D 25 HYDROXY: CPT

## 2025-01-29 PROCEDURE — 36415 COLL VENOUS BLD VENIPUNCTURE: CPT

## 2025-01-30 ENCOUNTER — RESULTS FOLLOW-UP (OUTPATIENT)
Dept: ENDOCRINOLOGY | Facility: CLINIC | Age: 65
End: 2025-01-30

## 2025-01-30 DIAGNOSIS — E55.9 VITAMIN D DEFICIENCY: Primary | ICD-10-CM

## 2025-01-30 LAB — 25(OH)D3 SERPL-MCNC: 36.8 NG/ML (ref 30–100)

## 2025-01-30 RX ORDER — VITAMIN K2 90 MCG
1000 CAPSULE ORAL DAILY
Start: 2025-01-30

## 2025-03-11 DIAGNOSIS — E78.00 PURE HYPERCHOLESTEROLEMIA: ICD-10-CM

## 2025-03-11 DIAGNOSIS — F41.8 DEPRESSION WITH ANXIETY: ICD-10-CM

## 2025-03-11 RX ORDER — ESCITALOPRAM OXALATE 10 MG/1
10 TABLET ORAL DAILY
Qty: 90 TABLET | Refills: 1 | Status: SHIPPED | OUTPATIENT
Start: 2025-03-11

## 2025-03-13 RX ORDER — ROSUVASTATIN CALCIUM 10 MG/1
10 TABLET, COATED ORAL DAILY
Qty: 90 TABLET | Refills: 3 | Status: SHIPPED | OUTPATIENT
Start: 2025-03-13

## 2025-03-20 ENCOUNTER — TELEPHONE (OUTPATIENT)
Age: 65
End: 2025-03-20

## 2025-03-20 NOTE — TELEPHONE ENCOUNTER
Received call from pt.  She needs to see a chiropractor and needed to know if she can do that given her aneurysm.  Spoke with Keli in [facility] surgery office, she spoke with Bettina, who advised that pt can proceed with seeing a chiropractor. Pt informed.

## 2025-03-28 ENCOUNTER — OFFICE VISIT (OUTPATIENT)
Dept: FAMILY MEDICINE CLINIC | Facility: MEDICAL CENTER | Age: 65
End: 2025-03-28
Payer: COMMERCIAL

## 2025-03-28 VITALS
DIASTOLIC BLOOD PRESSURE: 80 MMHG | TEMPERATURE: 98 F | BODY MASS INDEX: 20.53 KG/M2 | HEIGHT: 67 IN | OXYGEN SATURATION: 96 % | HEART RATE: 70 BPM | RESPIRATION RATE: 18 BRPM | WEIGHT: 130.8 LBS | SYSTOLIC BLOOD PRESSURE: 136 MMHG

## 2025-03-28 DIAGNOSIS — B02.9 HERPES ZOSTER WITHOUT COMPLICATION: Primary | ICD-10-CM

## 2025-03-28 PROCEDURE — 99213 OFFICE O/P EST LOW 20 MIN: CPT

## 2025-03-28 RX ORDER — VALACYCLOVIR HYDROCHLORIDE 1 G/1
1000 TABLET, FILM COATED ORAL 3 TIMES DAILY
Qty: 21 TABLET | Refills: 0 | Status: SHIPPED | OUTPATIENT
Start: 2025-03-28 | End: 2025-04-04

## 2025-03-28 NOTE — PROGRESS NOTES
"Name: Mojgan Lopez      : 1960      MRN: [id number]  Encounter Provider: MARY ANN Sanders  Encounter Date: 3/28/2025   Encounter department: [facility] [place]  :  Assessment & Plan  Herpes zoster without complication  Valtrex course as directed below.  May take otc tylenol as needed for pain relief.  Call if worsening pain or no improvement in symptoms.    Orders:    valACYclovir (VALTREX) 1,000 mg tablet; Take 1 tablet (1,000 mg total) by mouth 3 (three) times a day for 7 days           History of Present Illness   65 year old female presents with complaint of right sided back pain that she describes as burning sensation x 2 days, blister rash started in this area yesterday that is painful and burning.    No history of shingles. No previous shingles vaccine.           Review of Systems   Constitutional: Negative.    HENT: Negative.     Eyes: Negative.    Respiratory: Negative.     Cardiovascular: Negative.    Gastrointestinal: Negative.    Endocrine: Negative.    Genitourinary: Negative.    Musculoskeletal: Negative.    Skin:  Positive for rash.   Allergic/Immunologic: Negative.    Neurological: Negative.    Hematological: Negative.    Psychiatric/Behavioral: Negative.         Objective   /80 (BP Location: Left arm, Patient Position: Sitting, Cuff Size: Standard)   Pulse 70   Temp 98 °F (36.7 °C) (Temporal)   Resp 18   Ht 5' 7\" (1.702 m)   Wt 59.3 kg (130 lb 12.8 oz)   SpO2 96%   BMI 20.49 kg/m²      Physical Exam  Vitals and nursing note reviewed.   Constitutional:       General: She is not in acute distress.     Appearance: Normal appearance. She is not ill-appearing.   HENT:      Head: Normocephalic and atraumatic.   Cardiovascular:      Rate and Rhythm: Normal rate.   Pulmonary:      Effort: Pulmonary effort is normal.   Skin:     Findings: Rash present. Rash is vesicular.          Neurological:      General: No focal deficit present.      Mental Status: She " is alert and oriented to person, place, and time. Mental status is at baseline.   Psychiatric:         Mood and Affect: Mood normal.         Behavior: Behavior normal.         Thought Content: Thought content normal.

## 2025-04-07 ENCOUNTER — HOSPITAL ENCOUNTER (OUTPATIENT)
Dept: RADIOLOGY | Facility: MEDICAL CENTER | Age: 65
Discharge: HOME | End: 2025-04-07
Payer: COMMERCIAL

## 2025-04-07 DIAGNOSIS — M81.0 AGE-RELATED OSTEOPOROSIS WITHOUT CURRENT PATHOLOGICAL FRACTURE: ICD-10-CM

## 2025-04-07 PROCEDURE — 77080 DXA BONE DENSITY AXIAL: CPT

## 2025-04-09 ENCOUNTER — RESULTS FOLLOW-UP (OUTPATIENT)
Dept: ENDOCRINOLOGY | Facility: CLINIC | Age: 65
End: 2025-04-09

## 2025-04-21 ENCOUNTER — RESULTS FOLLOW-UP (OUTPATIENT)
Dept: ENDOCRINOLOGY | Facility: CLINIC | Age: 65
End: 2025-04-21

## 2025-04-21 ENCOUNTER — APPOINTMENT (OUTPATIENT)
Dept: LAB | Facility: MEDICAL CENTER | Age: 65
End: 2025-04-21
Payer: COMMERCIAL

## 2025-04-21 DIAGNOSIS — E03.9 ACQUIRED HYPOTHYROIDISM: ICD-10-CM

## 2025-04-21 LAB
T4 FREE SERPL-MCNC: 0.79 NG/DL (ref 0.61–1.12)
TSH SERPL DL<=0.05 MIU/L-ACNC: 9.02 UIU/ML (ref 0.45–4.5)

## 2025-04-21 PROCEDURE — 84443 ASSAY THYROID STIM HORMONE: CPT

## 2025-04-21 PROCEDURE — 84439 ASSAY OF FREE THYROXINE: CPT

## 2025-04-21 PROCEDURE — 36415 COLL VENOUS BLD VENIPUNCTURE: CPT

## 2025-04-21 NOTE — TELEPHONE ENCOUNTER
Patient was updated on her lab results. She said she hasn't missed any doses and feels tired all the time. I asked her if she takes her levo separately and she said she tries to. Most days she takes it around 6 and her other meds around 630 or 7am.

## 2025-04-22 RX ORDER — LEVOTHYROXINE SODIUM 88 UG/1
88 TABLET ORAL DAILY
Qty: 90 TABLET | Refills: 1 | Status: SHIPPED | OUTPATIENT
Start: 2025-04-22

## 2025-04-22 NOTE — TELEPHONE ENCOUNTER
Patient returning call and made aware:     Tamiko Martin PA-C to [facility]       4/22/25 12:34 PM  Result Note  Please call the patient regarding her abnormal result. I will increase her levothyroxine to 88 mcg daily.  I will send a new prescription to the pharmacy.  I ordered a repeat  TSH and free T4 in 6 weeks.  She should take the medication on an empty stomach at least 1 hour before breakfast and at least 4 hours apart from any supplements.    Ileana verbalized understanding

## 2025-06-17 ENCOUNTER — APPOINTMENT (OUTPATIENT)
Dept: RADIOLOGY | Facility: MEDICAL CENTER | Age: 65
End: 2025-06-17
Attending: PHYSICIAN ASSISTANT
Payer: COMMERCIAL

## 2025-06-17 ENCOUNTER — OFFICE VISIT (OUTPATIENT)
Dept: URGENT CARE | Facility: MEDICAL CENTER | Age: 65
End: 2025-06-17
Payer: COMMERCIAL

## 2025-06-17 VITALS
DIASTOLIC BLOOD PRESSURE: 70 MMHG | HEART RATE: 72 BPM | RESPIRATION RATE: 18 BRPM | OXYGEN SATURATION: 99 % | BODY MASS INDEX: 20.67 KG/M2 | TEMPERATURE: 97.6 F | SYSTOLIC BLOOD PRESSURE: 120 MMHG | WEIGHT: 132 LBS

## 2025-06-17 DIAGNOSIS — M54.32 SCIATICA OF LEFT SIDE: ICD-10-CM

## 2025-06-17 DIAGNOSIS — S73.102A SPRAIN OF LEFT HIP, INITIAL ENCOUNTER: ICD-10-CM

## 2025-06-17 DIAGNOSIS — M54.32 SCIATICA OF LEFT SIDE: Primary | ICD-10-CM

## 2025-06-17 PROCEDURE — 72100 X-RAY EXAM L-S SPINE 2/3 VWS: CPT

## 2025-06-17 PROCEDURE — 99213 OFFICE O/P EST LOW 20 MIN: CPT | Performed by: PHYSICIAN ASSISTANT

## 2025-06-17 PROCEDURE — 73502 X-RAY EXAM HIP UNI 2-3 VIEWS: CPT

## 2025-06-17 RX ORDER — PREDNISONE 20 MG/1
40 TABLET ORAL DAILY
Qty: 10 TABLET | Refills: 0 | Status: SHIPPED | OUTPATIENT
Start: 2025-06-17 | End: 2025-06-22

## 2025-06-17 NOTE — PATIENT INSTRUCTIONS
Sciatica  Prednisone once daily x 5 days  Follow up with PCP in 3-5 days.  Proceed to  ER if symptoms worsen.

## 2025-06-17 NOTE — PROGRESS NOTES
[facility]        NAME: Mojgan Lopez is a 65 y.o. female  : 1960    MRN: [id number]  DATE: 2025  TIME: 5:38 PM    Assessment and Plan   Sciatica of left side [M54.32]  1. Sciatica of left side  XR spine lumbar 2 or 3 views injury    XR hip/pelv 2-3 vws left if performed      2. Sprain of left hip, initial encounter              Patient Instructions     Sciatica  Prednisone once daily x 5 days  Follow up with PCP in 3-5 days.  Proceed to  ER if symptoms worsen.    Chief Complaint     Chief Complaint   Patient presents with   • Leg Pain     Noticed Saturday night upper left thigh swollen and painful.  Has been trying over the counter Aspercreme with lidocaine with no relief.  The pain keeps her up at night.  Didn't have anything bump area or hit anything.         History of Present Illness       65-year-old female who presents complaining of lower back pain, left hip pain radiating down the leg.  States the pain feels like the skin hurts.  Denies calf pain, chest pain, palpitations, shortness of breath.  Has tried over-the-counter Tylenol with no improvement.  Denies history of trauma, fever, rashes.    Leg Pain       Review of Systems   Review of Systems   Constitutional: Negative.    HENT: Negative.     Eyes: Negative.    Respiratory: Negative.  Negative for cough, chest tightness, shortness of breath, wheezing and stridor.    Cardiovascular: Negative.  Negative for chest pain, palpitations and leg swelling.         Current Medications     Current Medications[1]    Current Allergies     Allergies as of 2025 - Reviewed 2025   Allergen Reaction Noted   • Morphine and codeine Headache 2015            The following portions of the patient's history were reviewed and updated as appropriate: allergies, current medications, past family history, past medical history, past social history, past surgical history and problem list.     Past Medical History[2]    Past Surgical  History[3]    Family History[4]      Medications have been verified.        Objective   /70   Pulse 72   Temp 97.6 °F (36.4 °C)   Resp 18   Wt 59.9 kg (132 lb)   SpO2 99%   BMI 20.67 kg/m²        Physical Exam     Physical Exam  Constitutional:       Appearance: Normal appearance. She is well-developed.   HENT:      Head: Normocephalic and atraumatic.      Right Ear: External ear normal.      Left Ear: External ear normal.     Cardiovascular:      Rate and Rhythm: Normal rate and regular rhythm.      Heart sounds: Normal heart sounds.   Pulmonary:      Effort: Pulmonary effort is normal. No respiratory distress.      Breath sounds: Normal breath sounds. No wheezing or rales.   Chest:      Chest wall: No tenderness.   Abdominal:      General: Bowel sounds are normal.     Musculoskeletal:      Cervical back: Normal, normal range of motion and neck supple.      Thoracic back: Normal.      Lumbar back: Spasms, tenderness and bony tenderness present. No swelling, edema, deformity, signs of trauma or lacerations. Decreased range of motion. Negative right straight leg raise test and negative left straight leg raise test. No scoliosis.        Back:    Lymphadenopathy:      Cervical: No cervical adenopathy.     Neurological:      Mental Status: She is alert.                        [1]    Current Outpatient Medications:   •  acetaminophen (TYLENOL) 500 mg tablet, Take 2 tablets by mouth daily as needed, Disp: , Rfl:   •  Cholecalciferol (Vitamin D3) 1000 units CAPS, Take 1,000 Units by mouth in the morning, Disp: , Rfl:   •  escitalopram (LEXAPRO) 10 mg tablet, TAKE 1 TABLET BY MOUTH EVERY DAY, Disp: 90 tablet, Rfl: 1  •  levothyroxine 88 mcg tablet, Take 1 tablet (88 mcg total) by mouth daily, Disp: 90 tablet, Rfl: 1  •  LORazepam (ATIVAN) 0.5 mg tablet, Take 1 tablet (0.5 mg total) by mouth every 8 (eight) hours as needed for anxiety, Disp: 20 tablet, Rfl: 0  •  Multiple Vitamins-Minerals (MULTIVITAMIN ADULT  PO), Take 1 tablet by mouth in the morning., Disp: , Rfl:   •  rosuvastatin (CRESTOR) 10 MG tablet, TAKE 1 TABLET BY MOUTH EVERY DAY, Disp: 90 tablet, Rfl: 3  •  valACYclovir (VALTREX) 1,000 mg tablet, Take 1 tablet (1,000 mg total) by mouth 3 (three) times a day for 7 days, Disp: 21 tablet, Rfl: 0  [2]  Past Medical History:  Diagnosis Date   • Anemia 8/17/2018   • Celiac disease    • Chiari I malformation (HCC)    • Hyperlipemia    • Nonmelanoma skin cancer     last assessed 02/19/2015   • Squamous cell cancer of skin of ala nasi     2012   • Thoracic aortic aneurysm without rupture (HCC) 8/17/2018   • Thyroid disorder    [3]  Past Surgical History:  Procedure Laterality Date   • CHOLECYSTECTOMY     • COLECTOMY ZEB     • COLONOSCOPY      resolved 2010   • CORONARY ANGIOPLASTY WITH STENT PLACEMENT      celiac artery stent   • HYSTERECTOMY  2001   • LAPAROSCOPY      large intestine excision    • OOPHORECTOMY Bilateral 2001   • TRANSLUMINAL ANGIOPLASTY      intraoperative, renal artery    [4]  Family History  Problem Relation Name Age of Onset   • Arthritis Mother     • COPD Mother     • Rheum arthritis Mother     • COPD Father     • Rheumatic fever Father     • Rheum arthritis Father     • Hypertension Father     • Lung cancer Father     • Crohn's disease Family     • Psoriasis Family     • Lupus Family     • Ulcerative colitis Family     • Breast cancer Cousin david         over 50   • No Known Problems Sister     • No Known Problems Daughter     • No Known Problems Maternal Grandmother     • No Known Problems Maternal Grandfather     • No Known Problems Paternal Grandmother     • No Known Problems Paternal Grandfather     • No Known Problems Son     • No Known Problems Brother     • No Known Problems Brother     • No Known Problems Brother     • No Known Problems Brother     • Cancer Paternal Aunt     • Cancer Paternal Uncle

## 2025-06-19 ENCOUNTER — OFFICE VISIT (OUTPATIENT)
Dept: OBGYN CLINIC | Facility: MEDICAL CENTER | Age: 65
End: 2025-06-19
Payer: COMMERCIAL

## 2025-06-19 VITALS — BODY MASS INDEX: 20.67 KG/M2 | WEIGHT: 132 LBS

## 2025-06-19 DIAGNOSIS — M54.32 SCIATICA OF LEFT SIDE: ICD-10-CM

## 2025-06-19 DIAGNOSIS — M25.852 FEMOROACETABULAR IMPINGEMENT OF LEFT HIP: Primary | ICD-10-CM

## 2025-06-19 PROCEDURE — 99203 OFFICE O/P NEW LOW 30 MIN: CPT | Performed by: STUDENT IN AN ORGANIZED HEALTH CARE EDUCATION/TRAINING PROGRAM

## 2025-06-19 RX ORDER — NAPROXEN 500 MG/1
500 TABLET ORAL 2 TIMES DAILY WITH MEALS
Qty: 60 TABLET | Refills: 1 | Status: SHIPPED | OUTPATIENT
Start: 2025-06-19

## 2025-06-19 NOTE — PROGRESS NOTES
ASSESSMENT/PLAN:    Assessment & Plan  Femoroacetabular impingement of left hip    Sciatica of left side       Discussed history, exam, and imaging with patient. Presentation most consistent with left hip femoral acetabular impingement with likely underlying mild osteoarthritis and we will plan for initial nonoperative management at this time.  Discussed oral/topical medication regimen. Will plan for continued use of over-the-counter medication regimen with an additional prescription for naproxen placed.  Discussed injections as a pain adjunct.  She has several different sites which could offer her pain relief.  However, she does have imaging findings consistent with FRANK as well as a subjective groin pain, so I do feel that it would be worthwhile to have consideration of intra-articular hip injection.  Hopefully this will give her notable relief and she can continue with conservative treatment if desired.  If it does give notable relief and she would like to avoid continued injections going forward then it is an option to have MRI performed to see if she has not developed any notable contrecoup injury to her chondral surfaces to suggest that she would remain a candidate for arthroscopic hip intervention.  Discussed rehabilitation efforts. Will plan for formal physical therapy for strengthening about the left hip as well as the core.    Return in about 7 weeks (around 8/7/2025).    _____________________________________________________  CHIEF COMPLAINT:  Chief Complaint   Patient presents with    Left Hip - Pain       SUBJECTIVE:  Mojgan Lopez is a 65 y.o. year old female who presents for evaluation of left hip pain. The issue began approximately a few months ago. Mechanism of injury: unknown.  Feels that she had developed some swelling about the left hip as well.    Localization: Groin  Radiation: into medial calf at times  Exacerbating movements: initial standing from seated position  Positions of comfort:  None current  Attempted oral/topical medications: NSAID, heating pad, Aspercreme  Attempted injections: -  Attempted physical therapy: -    PAST MEDICAL HISTORY:  Past Medical History[1]    PAST SURGICAL HISTORY:  Past Surgical History[2]    FAMILY HISTORY:  Family History[3]    SOCIAL HISTORY:  Social History[4]    MEDICATIONS:  Current Medications[5]    ALLERGIES:  Allergies[6]    Review of systems:   Constitutional: Negative for fatigue, fever or loss of apetite.   HENT: Negative.    Respiratory: Negative for shortness of breath, dyspnea.    Cardiovascular: Negative for chest pain/tightness.   Gastrointestinal: Negative for abdominal pain, N/V.   Endocrine: Negative for cold/heat intolerance, unexplained weight loss/gain.   Genitourinary: Negative for flank pain, dysuria, hematuria.   Musculoskeletal: As in HPI   Skin: Negative for rash.    Neurological: Negative for numbness tingling  Psychiatric/Behavioral: Negative for agitation.  _____________________________________________________  PHYSICAL EXAMINATION:    Weight 59.9 kg (132 lb), not currently breastfeeding.    General: well developed and well nourished, alert, oriented times 3, and appears comfortable  HEENT: Benign, normocephalic, atraumatic  Cardiovascular: regular rate    Pulmonary: No wheezing or stridor  Abdomen: Soft, Nontender  Skin: No masses, erythema, lacerations, fluctation, ulcerations  Neurovascular: as per MSK exam below    MUSCULOSKELETAL EXAMINATION:    Left Hip exam  Ambulates with normal gait. Limb lengths symmetric as assessed at malleoli.   No swelling, bruising, deformity of hip. No Pannus or rash to anterior proximal thigh  TTP about the greater trochanter but with greater pain at lateral diaphysis than at troch  Passive hip flexion 0 - 90 degrees, without significant pain at terminal flexion  Passive internal rotation to 15 degrees with + impingement  Passive external rotation to 60 degrees with - JUWAN  4/5 Iliopsoas, 4+/5  quadricep, 4/5 hamstring  + [facility]  - Straight leg raise  - Log roll  Sensation intact to light touch in saphenous, sural, superficial peroneal, deep peroneal, and tibial distributions  Limb warm and well perfused with 2+ PT pulse      _____________________________________________________  STUDIES REVIEWED:  Images personally reviewed by me today     X-rays of left hip reviewed taken June 17 demonstrate minimal joint space narrowing to left hip.  There is evidence of femoral acetabular impingement with notably increased lateral center edge angle of approximately 51 degrees.  No evidence of fracture or dislocation.    X-ray of lumbar spine taken June 17 also reviewed which demonstrate no significant fractures or dislocation.  No notable scoliosis.  Mild multilevel disc height loss, but overall well-preserved with no significant listhesis.      Tommy Boland MD          [1]   Past Medical History:  Diagnosis Date    Anemia 8/17/2018    Celiac disease     Chiari I malformation (HCC)     Hyperlipemia     Nonmelanoma skin cancer     last assessed 02/19/2015    Squamous cell cancer of skin of ala nasi     2012    Thoracic aortic aneurysm without rupture (HCC) 8/17/2018    Thyroid disorder    [2]   Past Surgical History:  Procedure Laterality Date    CHOLECYSTECTOMY      COLECTOMY ZEB      COLONOSCOPY      resolved 2010    CORONARY ANGIOPLASTY WITH STENT PLACEMENT      celiac artery stent    HYSTERECTOMY  2001    LAPAROSCOPY      large intestine excision     OOPHORECTOMY Bilateral 2001    TRANSLUMINAL ANGIOPLASTY      intraoperative, renal artery    [3]   Family History  Problem Relation Name Age of Onset    Arthritis Mother      COPD Mother      Rheum arthritis Mother      COPD Father      Rheumatic fever Father      Rheum arthritis Father      Hypertension Father      Lung cancer Father      Crohn's disease Family      Psoriasis Family      Lupus Family      Ulcerative colitis Family      Breast cancer Cousin  david         over 50    No Known Problems Sister      No Known Problems Daughter      No Known Problems Maternal Grandmother      No Known Problems Maternal Grandfather      No Known Problems Paternal Grandmother      No Known Problems Paternal Grandfather      No Known Problems Son      No Known Problems Brother      No Known Problems Brother      No Known Problems Brother      No Known Problems Brother      Cancer Paternal Aunt      Cancer Paternal Uncle     [4]   Social History  Tobacco Use    Smoking status: Never    Smokeless tobacco: Never   Vaping Use    Vaping status: Never Used   Substance Use Topics    Alcohol use: Yes     Comment: 1 drink per month max    Drug use: No   [5]   Current Outpatient Medications:     naproxen (EC NAPROSYN) 500 MG EC tablet, Take 1 tablet (500 mg total) by mouth 2 (two) times a day with meals, Disp: 60 tablet, Rfl: 1    acetaminophen (TYLENOL) 500 mg tablet, Take 2 tablets by mouth daily as needed, Disp: , Rfl:     Cholecalciferol (Vitamin D3) 1000 units CAPS, Take 1,000 Units by mouth in the morning, Disp: , Rfl:     escitalopram (LEXAPRO) 10 mg tablet, TAKE 1 TABLET BY MOUTH EVERY DAY, Disp: 90 tablet, Rfl: 1    levothyroxine 88 mcg tablet, Take 1 tablet (88 mcg total) by mouth daily, Disp: 90 tablet, Rfl: 1    LORazepam (ATIVAN) 0.5 mg tablet, Take 1 tablet (0.5 mg total) by mouth every 8 (eight) hours as needed for anxiety, Disp: 20 tablet, Rfl: 0    Multiple Vitamins-Minerals (MULTIVITAMIN ADULT PO), Take 1 tablet by mouth in the morning., Disp: , Rfl:     predniSONE 20 mg tablet, Take 2 tablets (40 mg total) by mouth daily for 5 days, Disp: 10 tablet, Rfl: 0    rosuvastatin (CRESTOR) 10 MG tablet, TAKE 1 TABLET BY MOUTH EVERY DAY, Disp: 90 tablet, Rfl: 3    valACYclovir (VALTREX) 1,000 mg tablet, Take 1 tablet (1,000 mg total) by mouth 3 (three) times a day for 7 days, Disp: 21 tablet, Rfl: 0  [6]   Allergies  Allergen Reactions    Morphine And Codeine Headache

## 2025-06-24 ENCOUNTER — TELEPHONE (OUTPATIENT)
Dept: OBGYN CLINIC | Facility: CLINIC | Age: 65
End: 2025-06-24

## 2025-06-24 NOTE — TELEPHONE ENCOUNTER
Patient was returning your call regarding USGI - please call patient back when you can.    Call back # [phone]

## 2025-07-03 ENCOUNTER — OFFICE VISIT (OUTPATIENT)
Dept: OBGYN CLINIC | Facility: MEDICAL CENTER | Age: 65
End: 2025-07-03
Attending: STUDENT IN AN ORGANIZED HEALTH CARE EDUCATION/TRAINING PROGRAM
Payer: COMMERCIAL

## 2025-07-03 VITALS — HEIGHT: 67 IN | WEIGHT: 132 LBS | BODY MASS INDEX: 20.72 KG/M2

## 2025-07-03 DIAGNOSIS — M25.852 FEMOROACETABULAR IMPINGEMENT OF LEFT HIP: Primary | ICD-10-CM

## 2025-07-03 PROCEDURE — 20611 DRAIN/INJ JOINT/BURSA W/US: CPT | Performed by: PHYSICAL MEDICINE & REHABILITATION

## 2025-07-03 PROCEDURE — 99204 OFFICE O/P NEW MOD 45 MIN: CPT | Performed by: PHYSICAL MEDICINE & REHABILITATION

## 2025-07-03 RX ORDER — ROPIVACAINE HYDROCHLORIDE 5 MG/ML
10 INJECTION, SOLUTION EPIDURAL; INFILTRATION; PERINEURAL
Status: COMPLETED | OUTPATIENT
Start: 2025-07-03 | End: 2025-07-03

## 2025-07-03 RX ORDER — TRIAMCINOLONE ACETONIDE 40 MG/ML
80 INJECTION, SUSPENSION INTRA-ARTICULAR; INTRAMUSCULAR
Status: COMPLETED | OUTPATIENT
Start: 2025-07-03 | End: 2025-07-03

## 2025-07-03 RX ADMIN — ROPIVACAINE HYDROCHLORIDE 10 ML: 5 INJECTION, SOLUTION EPIDURAL; INFILTRATION; PERINEURAL at 10:15

## 2025-07-03 RX ADMIN — TRIAMCINOLONE ACETONIDE 80 MG: 40 INJECTION, SUSPENSION INTRA-ARTICULAR; INTRAMUSCULAR at 10:15

## 2025-07-03 NOTE — PROGRESS NOTES
"Assessment & Plan  Femoroacetabular impingement of left hip  This is a patient referred by Dr. Boland's team with left hip pain secondary to FRANK/mild hip OA.  The patient is a good candidate for ultrasound-guided hip joint injection.  Please see procedure note below.  Patient tolerated the procedure and had immediate relief of symptoms.  She is currently doing physical therapy through work and can restart this next week.  We will see her back in 6 weeks to reassess.  If continued symptoms without lasting relief, would consider an MRI of her left hip.  Orders:    Ambulatory Referral to Orthopedic Surgery    Large joint arthrocentesis: L hip joint    Imaging Studies (I personally reviewed images in PACS and report):  Hip x-rays most recent to this encounter reviewed.  These images show slight acetabular over-coverage consistent with pincer type FRANK/mild OA.  No acute osseous abnormalities.    No follow-ups on file.    Patient is in agreement with the above plan.    HPI:  Mojgan Lopez is a 65 y.o. female  who presents for evaluation of Pain of the Left Hip     History of present illness from referring clinician's notes reviewed.  Chronic pain in the hip and groin area.  Pain is worse with steps.  She stands all day while at work.  She does therapy through work.    Following history reviewed and updated:  Past Medical History[1]  Past Surgical History[2]  Social History   Social History     Substance and Sexual Activity   Alcohol Use Yes    Comment: 1 drink per month max     Social History     Substance and Sexual Activity   Drug Use No     Tobacco Use History[3]  Family History[4]  Allergies[5]     Constitutional:  Ht 5' 7.01\" (1.702 m)   Wt 59.9 kg (132 lb)   BMI 20.67 kg/m²    General: NAD.  Eyes: Anicteric sclerae.  Neck: Supple.  Lungs: Unlabored breathing.  Cardiovascular: No lower extremity edema.  Skin: Intact without erythema.  Neurologic: Sensation intact to light touch.  Psychiatric: Mood and affect " are appropriate.    Musculoskeletal Exam: Positive Stinchfield, log roll and hip scouring maneuver.  Pain with passive hip internal rotation while seated.  Normal straight leg raise test.  No erythema or skin abnormalities at the injection site and surrounding region.     Large joint arthrocentesis: L hip joint    Performed by: Rani Kennedy DO  Authorized by: Rani Kennedy DO    Universal Protocol:  Procedure performed by:  Consent: Verbal consent obtained. Written consent not obtained  Consent given by: patient  Timeout called at: 7/3/2025 9:37 AM.  Patient understanding: patient states understanding of the procedure being performed  Site marked: the operative site was marked  Radiology Images displayed and confirmed. If images not available, report reviewed: imaging studies available  Patient identity confirmed: verbally with patient  Supporting Documentation  Indications: pain and diagnostic evaluation     Is this a Visco injection? NoProcedure Details  Location: hip - L hip joint  Ultrasound guidance: yes (US guidance was used to find the area of interest.)  Medications administered: 80 mg triamcinolone acetonide 40 mg/mL; 10 mL ropivacaine 0.5 %    Patient tolerance: patient tolerated the procedure well with no immediate complications  Dressing:  Sterile dressing applied    The left hip joint was visualized with ultrasound and injected with steroid/anesthetic solution as indicated.    Prior to the injection, the ultrasound was used to evaluate for any neural or vascular structures.  Care was taken to avoid these structures.    The images (and video if taken) were saved to the [employer] ultrasound system.    Risks of this procedure include:    - Risk of bleeding since a needle is involved.  - Risk of infection (1/10,000 chance as per recent studies).  Signs/symptoms were discussed and they would prompt an urgent evaluation at an emergency department.  - Risk of pigmentation or skin dimpling in the skin (2-3% chance  as per recent studies) from the steroid.  - Risk of increased pain from steroid flare (1% chance as per recent studies) that typically lasts 24-48 hours.  - Risk of increased blood sugars from the steroid medication that can last for a few weeks.  If the patient is a diabetic or pre-diabetic, they were encouraged to closely monitor their blood sugars and discuss with PCP if elevated more than usual or if having symptoms.    We decided that the benefits outweigh the risks and so we proceeded with the procedure.                           [1]   Past Medical History:  Diagnosis Date    Anemia 8/17/2018    Celiac disease     Chiari I malformation (HCC)     Hyperlipemia     Nonmelanoma skin cancer     last assessed 02/19/2015    Squamous cell cancer of skin of ala nasi     2012    Thoracic aortic aneurysm without rupture (HCC) 8/17/2018    Thyroid disorder    [2]   Past Surgical History:  Procedure Laterality Date    CHOLECYSTECTOMY      COLECTOMY ZEB      COLONOSCOPY      resolved 2010    CORONARY ANGIOPLASTY WITH STENT PLACEMENT      celiac artery stent    HYSTERECTOMY  2001    LAPAROSCOPY      large intestine excision     OOPHORECTOMY Bilateral 2001    TRANSLUMINAL ANGIOPLASTY      intraoperative, renal artery    [3]   Social History  Tobacco Use   Smoking Status Never   Smokeless Tobacco Never   [4]   Family History  Problem Relation Name Age of Onset    Arthritis Mother      COPD Mother      Rheum arthritis Mother      COPD Father      Rheumatic fever Father      Rheum arthritis Father      Hypertension Father      Lung cancer Father      Crohn's disease Family      Psoriasis Family      Lupus Family      Ulcerative colitis Family      Breast cancer Cousin david         over 50    No Known Problems Sister      No Known Problems Daughter      No Known Problems Maternal Grandmother      No Known Problems Maternal Grandfather      No Known Problems Paternal Grandmother      No Known Problems Paternal Grandfather       No Known Problems Son      No Known Problems Brother      No Known Problems Brother      No Known Problems Brother      No Known Problems Brother      Cancer Paternal Aunt      Cancer Paternal Uncle     [5]   Allergies  Allergen Reactions    Morphine And Codeine Headache

## 2025-07-03 NOTE — ASSESSMENT & PLAN NOTE
This is a patient referred by Dr. Boland's team with left hip pain secondary to FRANK/mild hip OA.  The patient is a good candidate for ultrasound-guided hip joint injection.  Please see procedure note below.  Patient tolerated the procedure and had immediate relief of symptoms.  She is currently doing physical therapy through work and can restart this next week.  We will see her back in 6 weeks to reassess.  If continued symptoms without lasting relief, would consider an MRI of her left hip.  Orders:    Ambulatory Referral to Orthopedic Surgery    Large joint arthrocentesis: L hip joint

## 2025-07-17 ENCOUNTER — APPOINTMENT (OUTPATIENT)
Dept: LAB | Facility: MEDICAL CENTER | Age: 65
End: 2025-07-17
Attending: PHYSICIAN ASSISTANT
Payer: COMMERCIAL

## 2025-07-17 DIAGNOSIS — E55.9 VITAMIN D DEFICIENCY: ICD-10-CM

## 2025-07-17 DIAGNOSIS — M81.0 AGE-RELATED OSTEOPOROSIS WITHOUT CURRENT PATHOLOGICAL FRACTURE: ICD-10-CM

## 2025-07-17 DIAGNOSIS — E03.9 ACQUIRED HYPOTHYROIDISM: ICD-10-CM

## 2025-07-17 LAB
25(OH)D3 SERPL-MCNC: 40.1 NG/ML (ref 30–100)
ANION GAP SERPL CALCULATED.3IONS-SCNC: 9 MMOL/L (ref 4–13)
BUN SERPL-MCNC: 19 MG/DL (ref 5–25)
CALCIUM SERPL-MCNC: 9.6 MG/DL (ref 8.4–10.2)
CHLORIDE SERPL-SCNC: 103 MMOL/L (ref 96–108)
CO2 SERPL-SCNC: 27 MMOL/L (ref 21–32)
CREAT SERPL-MCNC: 0.53 MG/DL (ref 0.6–1.3)
GFR SERPL CREATININE-BSD FRML MDRD: 99 ML/MIN/1.73SQ M
GLUCOSE P FAST SERPL-MCNC: 89 MG/DL (ref 65–99)
POTASSIUM SERPL-SCNC: 3.7 MMOL/L (ref 3.5–5.3)
SODIUM SERPL-SCNC: 139 MMOL/L (ref 135–147)
T4 FREE SERPL-MCNC: 1.15 NG/DL (ref 0.61–1.12)
TSH SERPL DL<=0.05 MIU/L-ACNC: 0.46 UIU/ML (ref 0.45–4.5)

## 2025-07-17 PROCEDURE — 80048 BASIC METABOLIC PNL TOTAL CA: CPT

## 2025-07-17 PROCEDURE — 84439 ASSAY OF FREE THYROXINE: CPT

## 2025-07-17 PROCEDURE — 84443 ASSAY THYROID STIM HORMONE: CPT

## 2025-07-17 PROCEDURE — 36415 COLL VENOUS BLD VENIPUNCTURE: CPT

## 2025-07-17 PROCEDURE — 82306 VITAMIN D 25 HYDROXY: CPT

## 2025-07-18 ENCOUNTER — TELEPHONE (OUTPATIENT)
Age: 65
End: 2025-07-18

## 2025-07-18 DIAGNOSIS — E03.9 ACQUIRED HYPOTHYROIDISM: ICD-10-CM

## 2025-07-18 RX ORDER — LEVOTHYROXINE SODIUM 88 UG/1
88 TABLET ORAL DAILY
Qty: 90 TABLET | Refills: 1 | Status: SHIPPED | OUTPATIENT
Start: 2025-07-18

## 2025-07-18 RX ORDER — LEVOTHYROXINE SODIUM 88 UG/1
88 TABLET ORAL DAILY
Qty: 90 TABLET | Refills: 1 | Status: SHIPPED | OUTPATIENT
Start: 2025-07-18 | End: 2025-07-18 | Stop reason: SDUPTHER

## 2025-07-18 NOTE — TELEPHONE ENCOUNTER
Patient called in to see if Tamiko can look at her lab results. She is due for a refill but wants to first see if any adjustments to her medication needs to be made.    Please call patient back to advise.

## 2025-07-23 DIAGNOSIS — M81.0 AGE-RELATED OSTEOPOROSIS WITHOUT CURRENT PATHOLOGICAL FRACTURE: Primary | ICD-10-CM

## 2025-08-05 ENCOUNTER — TELEPHONE (OUTPATIENT)
Dept: OBGYN CLINIC | Facility: MEDICAL CENTER | Age: 65
End: 2025-08-05